# Patient Record
Sex: FEMALE | Race: BLACK OR AFRICAN AMERICAN | Employment: OTHER | URBAN - METROPOLITAN AREA
[De-identification: names, ages, dates, MRNs, and addresses within clinical notes are randomized per-mention and may not be internally consistent; named-entity substitution may affect disease eponyms.]

---

## 2018-11-19 ENCOUNTER — HOSPITAL ENCOUNTER (OUTPATIENT)
Dept: PHYSICAL THERAPY | Age: 63
Discharge: HOME | End: 2018-11-19
Payer: MEDICARE

## 2018-11-19 ENCOUNTER — HOSPITAL ENCOUNTER (OUTPATIENT)
Dept: SURGERY | Age: 63
Discharge: HOME | End: 2018-11-19
Payer: MEDICARE

## 2018-11-19 VITALS
WEIGHT: 285 LBS | HEART RATE: 85 BPM | BODY MASS INDEX: 44.73 KG/M2 | SYSTOLIC BLOOD PRESSURE: 125 MMHG | TEMPERATURE: 97.6 F | HEIGHT: 67 IN | OXYGEN SATURATION: 98 % | DIASTOLIC BLOOD PRESSURE: 53 MMHG

## 2018-11-19 PROBLEM — N18.30 CKD (CHRONIC KIDNEY DISEASE) STAGE 3, GFR 30-59 ML/MIN (HCC): Status: ACTIVE | Noted: 2018-11-19

## 2018-11-19 LAB
ANION GAP SERPL CALC-SCNC: 9 MMOL/L
APPEARANCE UR: ABNORMAL
APTT PPP: 32 SEC (ref 23.2–35.3)
ATRIAL RATE: 92 BPM
BACTERIA SPEC CULT: NORMAL
BACTERIA URNS QL MICRO: ABNORMAL /HPF
BASOPHILS # BLD: 0 K/UL (ref 0–0.2)
BASOPHILS NFR BLD: 0 % (ref 0–2)
BILIRUB UR QL: NEGATIVE
BUN SERPL-MCNC: 14 MG/DL (ref 8–23)
CALCIUM SERPL-MCNC: 9.3 MG/DL (ref 8.3–10.4)
CALCULATED P AXIS, ECG09: 60 DEGREES
CALCULATED R AXIS, ECG10: -51 DEGREES
CALCULATED T AXIS, ECG11: 47 DEGREES
CASTS URNS QL MICRO: ABNORMAL /LPF
CHLORIDE SERPL-SCNC: 99 MMOL/L (ref 98–107)
CO2 SERPL-SCNC: 31 MMOL/L (ref 21–32)
COLOR UR: YELLOW
CREAT SERPL-MCNC: 1.27 MG/DL (ref 0.6–1)
DIAGNOSIS, 93000: NORMAL
DIFFERENTIAL METHOD BLD: ABNORMAL
EOSINOPHIL # BLD: 0.2 K/UL (ref 0–0.8)
EOSINOPHIL NFR BLD: 2 % (ref 0.5–7.8)
EPI CELLS #/AREA URNS HPF: ABNORMAL /HPF
ERYTHROCYTE [DISTWIDTH] IN BLOOD BY AUTOMATED COUNT: 13.1 %
GLUCOSE SERPL-MCNC: 96 MG/DL (ref 65–100)
GLUCOSE UR STRIP.AUTO-MCNC: NEGATIVE MG/DL
HCT VFR BLD AUTO: 42.4 % (ref 35.8–46.3)
HGB BLD-MCNC: 13.1 G/DL (ref 11.7–15.4)
HGB UR QL STRIP: ABNORMAL
IMM GRANULOCYTES # BLD: 0 K/UL (ref 0–0.5)
IMM GRANULOCYTES NFR BLD AUTO: 0 % (ref 0–5)
INR PPP: 1
KETONES UR QL STRIP.AUTO: NEGATIVE MG/DL
LEUKOCYTE ESTERASE UR QL STRIP.AUTO: ABNORMAL
LYMPHOCYTES # BLD: 2 K/UL (ref 0.5–4.6)
LYMPHOCYTES NFR BLD: 30 % (ref 13–44)
MCH RBC QN AUTO: 27 PG (ref 26.1–32.9)
MCHC RBC AUTO-ENTMCNC: 30.9 G/DL (ref 31.4–35)
MCV RBC AUTO: 87.2 FL (ref 79.6–97.8)
MONOCYTES # BLD: 0.6 K/UL (ref 0.1–1.3)
MONOCYTES NFR BLD: 8 % (ref 4–12)
NEUTS SEG # BLD: 4 K/UL (ref 1.7–8.2)
NEUTS SEG NFR BLD: 59 % (ref 43–78)
NITRITE UR QL STRIP.AUTO: NEGATIVE
NRBC # BLD: 0 K/UL (ref 0–0.2)
P-R INTERVAL, ECG05: 144 MS
PH UR STRIP: 5.5 [PH] (ref 5–9)
PLATELET # BLD AUTO: 413 K/UL (ref 150–450)
PMV BLD AUTO: 9.6 FL (ref 9.4–12.3)
POTASSIUM SERPL-SCNC: 3.3 MMOL/L (ref 3.5–5.1)
PROT UR STRIP-MCNC: NEGATIVE MG/DL
PROTHROMBIN TIME: 13.6 SEC (ref 11.5–14.5)
Q-T INTERVAL, ECG07: 400 MS
QRS DURATION, ECG06: 136 MS
QTC CALCULATION (BEZET), ECG08: 494 MS
RBC # BLD AUTO: 4.86 M/UL (ref 4.05–5.2)
RBC #/AREA URNS HPF: ABNORMAL /HPF
SERVICE CMNT-IMP: NORMAL
SODIUM SERPL-SCNC: 139 MMOL/L (ref 136–145)
SP GR UR REFRACTOMETRY: 1.02 (ref 1–1.02)
UROBILINOGEN UR QL STRIP.AUTO: 1 EU/DL (ref 0.2–1)
VENTRICULAR RATE, ECG03: 92 BPM
WBC # BLD AUTO: 6.8 K/UL (ref 4.3–11.1)
WBC URNS QL MICRO: >100 /HPF

## 2018-11-19 PROCEDURE — 87641 MR-STAPH DNA AMP PROBE: CPT

## 2018-11-19 PROCEDURE — 85730 THROMBOPLASTIN TIME PARTIAL: CPT

## 2018-11-19 PROCEDURE — 85610 PROTHROMBIN TIME: CPT

## 2018-11-19 PROCEDURE — 93005 ELECTROCARDIOGRAM TRACING: CPT | Performed by: ANESTHESIOLOGY

## 2018-11-19 PROCEDURE — 80048 BASIC METABOLIC PNL TOTAL CA: CPT

## 2018-11-19 PROCEDURE — 36415 COLL VENOUS BLD VENIPUNCTURE: CPT

## 2018-11-19 PROCEDURE — G8980 MOBILITY D/C STATUS: HCPCS

## 2018-11-19 PROCEDURE — 81001 URINALYSIS AUTO W/SCOPE: CPT

## 2018-11-19 PROCEDURE — 85025 COMPLETE CBC W/AUTO DIFF WBC: CPT

## 2018-11-19 PROCEDURE — G8978 MOBILITY CURRENT STATUS: HCPCS

## 2018-11-19 PROCEDURE — G8979 MOBILITY GOAL STATUS: HCPCS

## 2018-11-19 PROCEDURE — 97161 PT EVAL LOW COMPLEX 20 MIN: CPT

## 2018-11-19 RX ORDER — TOPIRAMATE 50 MG/1
50 TABLET, FILM COATED ORAL DAILY
COMMUNITY
End: 2018-12-03

## 2018-11-19 RX ORDER — ALBUTEROL SULFATE 90 UG/1
1 AEROSOL, METERED RESPIRATORY (INHALATION)
COMMUNITY

## 2018-11-19 RX ORDER — ROSUVASTATIN CALCIUM 5 MG/1
5 TABLET, COATED ORAL
COMMUNITY
End: 2018-12-03

## 2018-11-19 RX ORDER — TRIAMTERENE AND HYDROCHLOROTHIAZIDE 37.5; 25 MG/1; MG/1
1 CAPSULE ORAL DAILY
COMMUNITY

## 2018-11-19 RX ORDER — PHENTERMINE HYDROCHLORIDE 15 MG/1
15 CAPSULE ORAL
COMMUNITY
End: 2018-12-03

## 2018-11-19 RX ORDER — TRAMADOL HYDROCHLORIDE 50 MG/1
50 TABLET ORAL
COMMUNITY
End: 2018-12-20

## 2018-11-19 RX ORDER — GABAPENTIN 300 MG/1
300 CAPSULE ORAL
COMMUNITY
End: 2018-12-20

## 2018-11-19 NOTE — PROGRESS NOTES
Agueda Martinez : 2062(41 y.o.) [address] at [address] [facility] [address] [phone] Physical Therapy Prehab Plan of Treatment and Evaluation Summary:2018 ICD-10: Treatment Diagnosis:  
· Pain in Left Knee (M25.562) · Stiffness of Left Knee, Not elsewhere classified (M25.662) · Difficulty in walking, Not elsewhere classified (R26.2) · Other abnormalities of gait and mobility (R26.89) Precautions/Allergies:  
Hydrocodone  MEDICAL/REFERRING DIAGNOSIS: 
Unilateral primary osteoarthritis, left knee [M17.12] REFERRING PHYSICIAN: Ashli Bustamante MD 
DATE OF SURGERY: 18 Assessment:  
Comments:  Pt with decreased independence with functional mobility. Pt plans to return home following hospital stay. Pt's daughter and granddaughter present and supportive. PROBLEM LIST (Impacting functional limitations): Ms. Zachary Ruelas presents with the following left lower extremity(s) problems: 1. Transfers 2. Gait 3. Strength 4. Range of Motion 5. Pain INTERVENTIONS PLANNED: 
1. Home Exercise Program 
2. Educational Discussion TREATMENT PLAN: Effective Dates: 2018 TO 2018. Frequency/Duration: Patient to continue to perform home exercise program at least twice per day up until her surgery. GOALS: (Goals have been discussed and agreed upon with patient.) Discharge Goals: Time Frame: 1 Day 1. Patient will demonstrate independence with a home exercise program designed to increase strength, range of motion and pain control to minimize functional deficits and optimize patient for total joint replacement. Rehabilitation Potential For Stated Goals: Good Regarding Agueda Martinez therapy, I certify that the treatment plan above will be carried out by a therapist or under their direction. Thank you for this referral, Laurel Litten Boteler, PT     
    
 
 
HISTORY:  
Present Symptoms: 
Pain Intensity 1: 10 
 Pain Location 1: Knee Pain Orientation 1: Left History of Present Injury/Illness (Reason for Referral): 
Medical/Referring Diagnosis: Unilateral primary osteoarthritis, left knee [M17.12] Past Medical History/Comorbidities: Ms. Isidro Lao  has a past medical history of Asthma, High cholesterol, History of anemia, History of hypokalemia, Hypertension, Morbid obesity ([address] Neuropathy, EMY on CPAP, Osteoarthritis, and Unspecified adverse effect of anesthesia. Ms. Isidro Lao  has a past surgical history that includes hx hernia repair; BILATERAL HAND CARPAL TUNNEL RELEASE (Bilateral, 9/16/2014); and RIGHT LONG FINGER TRIGGER RELEASE (Right, 9/16/2014). Social History/Living Environment:  
Home Environment: Private residence # Steps to Enter: 6 One/Two Story Residence: One story Living Alone: No 
Support Systems: Child(nano) Current DME Used/Available at Home: Elfreda Floro, straight, Commode, bedside Tub or Shower Type: Shower Work/Activity: 
retired Dominant Side: 
RIGHT Current Medications:  See Pre-assessment nursing note Number of Personal Factors/Comorbidities that affect the Plan of Care: 0: LOW COMPLEXITY EXAMINATION:  
ADLs (Current Functional Status):  
Ambulation: 
[x] Independent 
[] Walk Indoors Only 
[] Walk Outdoors [x] Use Assistive Device cane 
[] Use Wheelchair Only Dressing: 
[x] Independent Requires Assistance from Someone for: 
[x] Sock/Shoes 
[] Pants 
[] Everything Bathing/Showering:  
[x] Independent 
[] Requires Assistance from Someone 
[] Sponge Bath Only Household Activities: 
[] Routine house and yard work 
[] Light Housework Only sometimes dishes 
[x] None Observation/Orthostatic Postural Assessment:  
Within defined limits ROM/Flexibility:  
AROM: Generally decreased, functional 
 
  
  
  
  
LLE AROM 
L Knee Flexion: 60 L Knee Extension: 10 RLE Assessment RLE Assessment (WDL): Within defined limits Strength:  
Strength: Generally decreased, functional 
 
  
    
 Functional Mobility:   
Coordination: Generally decreased, functional 
 
Gait Description (WDL): Within defined limits Stand to Sit: Independent Sit to Stand: Independent Distance (ft): 125 Feet (ft)(wheelchair for longer distances) Ambulation - Level of Assistance: Modified independent Assistive Device: Cane, straight Gait Abnormalities: Antalgic Balance:   
Sitting: Intact Standing: Intact Body Structures Involved: 1. Bones 2. Joints 3. Muscles 4. Ligaments Body Functions Affected: 1. Neuromusculoskeletal 
2. Movement Related Activities and Participation Affected: 1. Mobility Number of elements that affect the Plan of Care: 4+: HIGH COMPLEXITY CLINICAL PRESENTATION:  
Presentation: Stable and uncomplicated: LOW COMPLEXITY CLINICAL DECISION MAKING:  
Outcome Measure: Tool Used: Lower Extremity Functional Scale (LEFS) Score:  Initial: 21/80 Most Recent: X/80 (Date: -- ) Interpretation of Score: 20 questions each scored on a 5 point scale with 0 representing \"extreme difficulty or unable to perform\" and 4 representing \"no difficulty\". The lower the score, the greater the functional disability. 80/80 represents no disability. Minimal detectable change is 9 points. Score 80 79-65 64-49 48-33 32-17 16-1 0 Modifier CH CI CJ CK CL CM CN  
 
? Mobility - Walking and Moving Around:  
  - CURRENT STATUS: CL - 60%-79% impaired, limited or restricted  - GOAL STATUS: CL - 60%-79% impaired, limited or restricted  - D/C STATUS:  CL - 60%-79% impaired, limited or restricted Medical Necessity:  
· Ms. Aliya Rayo is expected to optimize her lower extremity strength and ROM in preparation for joint replacement surgery. Reason for Services/Other Comments: · Achieve baseline assesment of musculoskeletal system, functional mobility and home environment. , educate in PT HEP in preparation for surgery, educate in hospital plan of care. Use of outcome tool(s) and clinical judgement create a POC that gives a: Clear prediction of patient's progress: LOW COMPLEXITY  
TREATMENT:  
Treatment/Session Assessment:  Patient was instructed in PT- HEP to increase strength and ROM in LEs. Answered all questions. · Post session pain:  10 
· Compliance with Program/Exercises: compliant most of the time. Total Treatment Duration: PT Patient Time In/Time Out Time In: 0945 Time Out: 1015 Claudette Adu, PT

## 2018-11-19 NOTE — PERIOP NOTES
Dr Amanda Mallory (anesthesia) reviewed pt's EKG from today- orders received for pt to have cardiology clearance prior to surgery. Called and l/m with Cary (MA to surgeon) informing of cardiac clearance need- pt is not an established pt at cardiology office per EMR.

## 2018-11-19 NOTE — ADVANCED PRACTICE NURSE
Total Joint Surgery Preoperative Chart Review      Patient ID:  Anna Ponce  [id number]  88 y.o.  1955  Surgeon: Dr. Jayesh Busby  Date of Surgery: 12/13/2018  Procedure: Total Left Knee Arthroplasty  Primary Care Physician: Leticia Campos [phone]  Specialty Physician(s):      Subjective:   Anna Ponce is a 70 y.o. BLACK OR  female who presents for preoperative evaluation for Total Left Knee arthroplasty. This is a preoperative chart review note based on data collected by the nurse at the surgical Pre-Assessment visit. Past Medical History:   Diagnosis Date    Asthma     ProAir PRN --Followed by Dr. Waymond Heimlich High cholesterol     History of anemia     History of hypokalemia     K+ 3.3 on 11/19/18    Hypertension     controlled with med    Morbid obesity ([facility] [address] 09/11/2014    BMI- 44.6      Neuropathy     EMY on CPAP     Followed by Dr. Chrissy Barajas Unspecified adverse effect of anesthesia     delayed awakening      Past Surgical History:   Procedure Laterality Date    HX HERNIA REPAIR       Family History   Problem Relation Age of Onset    Cancer Mother     Heart Disease Brother         takes NTG- no MI    Cancer Brother       Social History     Tobacco Use    Smoking status: Never Smoker    Smokeless tobacco: Never Used   Substance Use Topics    Alcohol use: No       Prior to Admission medications    Medication Sig Start Date End Date Taking? Authorizing Provider   gabapentin (NEURONTIN) 300 mg capsule Take 300 mg by mouth nightly. Yes Provider, Historical   traMADol (ULTRAM) 50 mg tablet Take 50 mg by mouth every six (6) hours as needed for Pain. Yes Provider, Historical   rosuvastatin (CRESTOR) 5 mg tablet Take 5 mg by mouth nightly. Yes Provider, Historical   phentermine (ADIPEX_P) 15 mg capsule Take 15 mg by mouth every morning. Yes Provider, Historical   topiramate (TOPAMAX) 50 mg tablet Take 50 mg by mouth daily. Yes Provider, Historical   triamterene-hydroCHLOROthiazide (DYAZIDE) 37.5-25 mg per capsule Take 1 Cap by mouth daily. Yes Provider, Historical   albuterol (PROAIR HFA) 90 mcg/actuation inhaler Take  by inhalation every four (4) hours as needed for Wheezing. Yes Provider, Historical     Allergies   Allergen Reactions    Hydrocodone Nausea and Vomiting          Objective:     Physical Exam:   Patient Vitals for the past 24 hrs:   Temp Pulse BP SpO2   11/19/18 1035 97.6 °F (36.4 °C) 85 125/53 98 %       ECG:    EKG Results     Procedure 720 Value Units Date/Time    EKG, 12 LEAD, INITIAL [[id number]] Collected:  11/19/18 1028    Order Status:  Completed Updated:  11/19/18 1256     Ventricular Rate 92 BPM      Atrial Rate 92 BPM      P-R Interval 144 ms      QRS Duration 136 ms      Q-T Interval 400 ms      QTC Calculation (Bezet) 494 ms      Calculated P Axis 60 degrees      Calculated R Axis -51 degrees      Calculated T Axis 47 degrees      Diagnosis --     Sinus rhythm with occasional Premature ventricular complexes  Right bundle branch block  Left anterior fascicular block  !!! Bifascicular block !!!  Cannot rule out Inferior infarct (masked by fascicular block?) , age   undetermined  Abnormal ECG  No previous ECGs available            Data Review:   Labs:   Recent Results (from the past 24 hour(s))   CBC WITH AUTOMATED DIFF    Collection Time: 11/19/18  9:15 AM   Result Value Ref Range    WBC 6.8 4.3 - 11.1 K/uL    RBC 4.86 4.05 - 5.2 M/uL    HGB 13.1 11.7 - 15.4 g/dL    HCT 42.4 35.8 - 46.3 %    MCV 87.2 79.6 - 97.8 FL    MCH 27.0 26.1 - 32.9 PG    MCHC 30.9 (L) 31.4 - 35.0 g/dL    RDW 13.1 %    PLATELET 483 031 - 397 K/uL    MPV 9.6 9.4 - 12.3 FL    ABSOLUTE NRBC 0.00 0.0 - 0.2 K/uL    DF AUTOMATED      NEUTROPHILS 59 43 - 78 %    LYMPHOCYTES 30 13 - 44 %    MONOCYTES 8 4.0 - 12.0 %    EOSINOPHILS 2 0.5 - 7.8 %    BASOPHILS 0 0.0 - 2.0 %    IMMATURE GRANULOCYTES 0 0.0 - 5.0 %    ABS.  NEUTROPHILS 4.0 1.7 - 8.2 K/UL    ABS. LYMPHOCYTES 2.0 0.5 - 4.6 K/UL    ABS. MONOCYTES 0.6 0.1 - 1.3 K/UL    ABS. EOSINOPHILS 0.2 0.0 - 0.8 K/UL    ABS. BASOPHILS 0.0 0.0 - 0.2 K/UL    ABS. IMM. GRANS. 0.0 0.0 - 0.5 K/UL   METABOLIC PANEL, BASIC    Collection Time: 11/19/18  9:15 AM   Result Value Ref Range    Sodium 139 136 - 145 mmol/L    Potassium 3.3 (L) 3.5 - 5.1 mmol/L    Chloride 99 98 - 107 mmol/L    CO2 31 21 - 32 mmol/L    Anion gap 9 mmol/L    Glucose 96 65 - 100 mg/dL    BUN 14 8 - 23 MG/DL    Creatinine 1.27 (H) 0.6 - 1.0 MG/DL    GFR est AA 54 (L) >60 ml/min/1.73m2    GFR est non-AA 45 ml/min/1.73m2    Calcium 9.3 8.3 - 10.4 MG/DL   PROTHROMBIN TIME + INR    Collection Time: 11/19/18  9:15 AM   Result Value Ref Range    Prothrombin time 13.6 11.5 - 14.5 sec    INR 1.0     PTT    Collection Time: 11/19/18  9:15 AM   Result Value Ref Range    aPTT 32.0 23.2 - 35.3 SEC   URINALYSIS W/ RFLX MICROSCOPIC    Collection Time: 11/19/18  9:15 AM   Result Value Ref Range    Color YELLOW      Appearance CLOUDY      Specific gravity 1.017 1.001 - 1.023      pH (UA) 5.5 5.0 - 9.0      Protein NEGATIVE  NEG mg/dL    Glucose NEGATIVE  mg/dL    Ketone NEGATIVE  NEG mg/dL    Bilirubin NEGATIVE  NEG      Blood TRACE (A) NEG      Urobilinogen 1.0 0.2 - 1.0 EU/dL    Nitrites NEGATIVE  NEG      Leukocyte Esterase LARGE (A) NEG      WBC >100 (H) 0 /hpf    RBC 3-5 0 /hpf    Epithelial cells 5-10 0 /hpf    Bacteria TRACE 0 /hpf    Casts 0-3 0 /lpf   EKG, 12 LEAD, INITIAL    Collection Time: 11/19/18 10:28 AM   Result Value Ref Range    Ventricular Rate 92 BPM    Atrial Rate 92 BPM    P-R Interval 144 ms    QRS Duration 136 ms    Q-T Interval 400 ms    QTC Calculation (Bezet) 494 ms    Calculated P Axis 60 degrees    Calculated R Axis -51 degrees    Calculated T Axis 47 degrees    Diagnosis       Sinus rhythm with occasional Premature ventricular complexes  Right bundle branch block  Left anterior fascicular block  !!!  Bifascicular block !!!  Cannot rule out Inferior infarct (masked by fascicular block?) , age   undetermined  Abnormal ECG  No previous ECGs available         Total Joint Surgery Pre-Assessment Recommendations:     Patient with multiple comorbidities including:  BMI greater than 40, advanced age 70, sleep apnea, CKD 3 and neuropathy. Patient would benefit from inpatient hospitalization with total knee surgery. Patient is to wear home CPAP during hospitalization. Albuterol every 6 hours as need during hospitalization.                Signed By: MARYANNE Tillman    November 19, 2018

## 2018-11-19 NOTE — PROGRESS NOTES
11/19/18 0900   Oxygen Therapy   O2 Sat (%) 93 %   Pulse via Oximetry 95 beats per minute   O2 Device Room air   Pre-Treatment   Breath Sounds Bilateral Clear;Diminished   Pre FEV1 (liters) 1.5 liters   % Predicted 68     Initial respiratory Assessment completed with pt. Pt was interviewed and evaluated in Joint camp prior to surgery. Patient ID:  Jed Meyer  [id number]  70 y.o.  1955  Surgeon: Dr. Borja  Date of Surgery: 12/13/2018  Procedure: Total Left Knee Arthroplasty  Primary Care Physician: Maine Juares -[phone]  Specialists:                                  Pt instructed in the use of Incentive Spirometry. Pt instructed to bring Incentive Spirometer back on date of surgery & to start using Is upon return to pt room.     Pt taught proper cough technique    History of smoking:   FORMER 1 PACK A MONTH OFF & ON UNTIL AGE 33                                                      Quit date:           Secondhand smoke:MOTHER      Past procedures with Oxygen desaturation:DELAYED AWAKENING    Past Medical History:   Diagnosis Date    Asthma     ProAir PRN --Followed by Dr. Evangelista Jhaveri High cholesterol     History of anemia     History of hypokalemia     K+ 3.3 on 11/19/18    Hypertension     controlled with med    Morbid obesity ([facility] [address] 09/11/2014    BMI- 44.6      Neuropathy     EMY on CPAP     Followed by Dr. Cierra Madden Unspecified adverse effect of anesthesia     delayed awakening                                                           ASTHMA                                                                                          Respiratory history:DENIES SOB                                                                  Respiratory meds: DOES NOT USE MDI CURRENTLY                                        FAMILY PRESENT:          DAUGHTER                                                                                      PAST SLEEP STUDY:        YES HX OF EMY:                        YES                                                       EMY assessment:                                               SLEEPS ON SIDE          AND             STOMACH                                                 PHYSICAL EXAM   Body mass index is 44.64 kg/m².    Visit Vitals  /53 (BP 1 Location: Left arm, BP Patient Position: At rest)   Pulse 85   Temp 97.6 °F (36.4 °C)   Ht 5' 7\" (1.702 m)   Wt 129.3 kg (285 lb)   SpO2 98%   BMI 44.64 kg/m²     Neck circumference:42.5      cm    Loud snoring:        YES      Witnessed apnea or wakening gasping or choking:,                                                                                                    APNEA    Awakens with headaches:                                                  DENIES    Morning or daytime tiredness/ sleepiness:                                                                                                       TIRED   Dry mouth or sore throat in morning:                YES                                                                           Grubbs stage:  4    SACS score:30    STOP/BANG:                              CPAP:                                                       HOME CPAP      ALBUTEROL NEB Q6 PRN                     Referrals:    Pt. Phone Number:

## 2018-11-19 NOTE — PERIOP NOTES
Recent Results (from the past 12 hour(s))   CBC WITH AUTOMATED DIFF    Collection Time: 11/19/18  9:15 AM   Result Value Ref Range    WBC 6.8 4.3 - 11.1 K/uL    RBC 4.86 4.05 - 5.2 M/uL    HGB 13.1 11.7 - 15.4 g/dL    HCT 42.4 35.8 - 46.3 %    MCV 87.2 79.6 - 97.8 FL    MCH 27.0 26.1 - 32.9 PG    MCHC 30.9 (L) 31.4 - 35.0 g/dL    RDW 13.1 %    PLATELET 587 634 - 232 K/uL    MPV 9.6 9.4 - 12.3 FL    ABSOLUTE NRBC 0.00 0.0 - 0.2 K/uL    DF AUTOMATED      NEUTROPHILS 59 43 - 78 %    LYMPHOCYTES 30 13 - 44 %    MONOCYTES 8 4.0 - 12.0 %    EOSINOPHILS 2 0.5 - 7.8 %    BASOPHILS 0 0.0 - 2.0 %    IMMATURE GRANULOCYTES 0 0.0 - 5.0 %    ABS. NEUTROPHILS 4.0 1.7 - 8.2 K/UL    ABS. LYMPHOCYTES 2.0 0.5 - 4.6 K/UL    ABS. MONOCYTES 0.6 0.1 - 1.3 K/UL    ABS. EOSINOPHILS 0.2 0.0 - 0.8 K/UL    ABS. BASOPHILS 0.0 0.0 - 0.2 K/UL    ABS. IMM.  GRANS. 0.0 0.0 - 0.5 K/UL   METABOLIC PANEL, BASIC    Collection Time: 11/19/18  9:15 AM   Result Value Ref Range    Sodium 139 136 - 145 mmol/L    Potassium 3.3 (L) 3.5 - 5.1 mmol/L    Chloride 99 98 - 107 mmol/L    CO2 31 21 - 32 mmol/L    Anion gap 9 mmol/L    Glucose 96 65 - 100 mg/dL    BUN 14 8 - 23 MG/DL    Creatinine 1.27 (H) 0.6 - 1.0 MG/DL    GFR est AA 54 (L) >60 ml/min/1.73m2    GFR est non-AA 45 ml/min/1.73m2    Calcium 9.3 8.3 - 10.4 MG/DL   PROTHROMBIN TIME + INR    Collection Time: 11/19/18  9:15 AM   Result Value Ref Range    Prothrombin time 13.6 11.5 - 14.5 sec    INR 1.0     PTT    Collection Time: 11/19/18  9:15 AM   Result Value Ref Range    aPTT 32.0 23.2 - 35.3 SEC   URINALYSIS W/ RFLX MICROSCOPIC    Collection Time: 11/19/18  9:15 AM   Result Value Ref Range    Color YELLOW      Appearance CLOUDY      Specific gravity 1.017 1.001 - 1.023      pH (UA) 5.5 5.0 - 9.0      Protein NEGATIVE  NEG mg/dL    Glucose NEGATIVE  mg/dL    Ketone NEGATIVE  NEG mg/dL    Bilirubin NEGATIVE  NEG      Blood TRACE (A) NEG      Urobilinogen 1.0 0.2 - 1.0 EU/dL    Nitrites NEGATIVE  NEG Leukocyte Esterase LARGE (A) NEG      WBC >100 (H) 0 /hpf    RBC 3-5 0 /hpf    Epithelial cells 5-10 0 /hpf    Bacteria TRACE 0 /hpf    Casts 0-3 0 /lpf

## 2018-11-19 NOTE — PERIOP NOTES
Labs dated 11/19/18 routed via [facility] to patients PCP, Dr. Anthony Johns, and to ordering physician Dr. Carrington Gutierrez.

## 2018-11-19 NOTE — PERIOP NOTES
Patient verified name, procedure, and . Order for consent found in EHR and matches case posting. Type 3 surgery, PAT joint assessment complete. Labs per surgeon: cbc, bmp, ua, pt/inr, ptt; results within anesthesia limits. MRSA/MSSA swab results pending--pharmacy to follow up. T&S DOS; order signed and held in [facility]. Labs per anesthesia protocol: All required lab work included in surgeon's orders. EKG: completed 18; results to be reviewed by anesthesia once previous EKG tracing received from patient's PCP's office for comparison. Most recent pulmonary note (18) located in EHR if needed for anesthesia reference. Hibiclens and instructions to return bottle on DOS given per hospital policy. Patient provided with handouts including Guide to Surgery, Pain Management, Hand Hygiene, Blood Transfusion Education, and [place] [facility] Brochure. Patient answered medical/surgical history questions at their best of ability. All prior to admission medications documented in [facility]. Original medication prescription bottle NOT visualized during patient appointment. Patient instructed to hold all vitamins 7 days prior to surgery and NSAIDS 5 days prior to surgery. Medications to be held: all non-prescribed vitamins/supplements/herbals, and Phentermine. Patient instructed to continue previous medications as prescribed prior to surgery and to take the following medications the day of surgery according to anesthesia guidelines with a small sip of water: Tramadol if needed, Topiramate, and ProAir PRN. Patient instructed to bring bottle of Hibiclens, ProAir, and CPAP to the hospital on the DOS. Patient teach back successful and patient demonstrates knowledge of instruction.

## 2018-12-03 NOTE — PERIOP NOTES
Cardiology clearance note dated today (12/3/18) in EMR that reads:    \"Patient is low to moderate risk for knee replacement surgery, continue BP control. She should be able to resume her CPAP after surgery citing severe pain as the reason she is not using it currently.  Able to attain 4 MET workload and greater than that prior to 3 months ago without symptoms\"

## 2018-12-12 ENCOUNTER — ANESTHESIA EVENT (OUTPATIENT)
Dept: SURGERY | Age: 63
DRG: 470 | End: 2018-12-12
Payer: MEDICARE

## 2018-12-12 RX ORDER — HYDROMORPHONE HYDROCHLORIDE 2 MG/ML
0.5 INJECTION, SOLUTION INTRAMUSCULAR; INTRAVENOUS; SUBCUTANEOUS
Status: CANCELLED | OUTPATIENT
Start: 2018-12-12

## 2018-12-12 RX ORDER — SODIUM CHLORIDE, SODIUM LACTATE, POTASSIUM CHLORIDE, CALCIUM CHLORIDE 600; 310; 30; 20 MG/100ML; MG/100ML; MG/100ML; MG/100ML
75 INJECTION, SOLUTION INTRAVENOUS CONTINUOUS
Status: CANCELLED | OUTPATIENT
Start: 2018-12-12

## 2018-12-12 RX ORDER — DIPHENHYDRAMINE HYDROCHLORIDE 50 MG/ML
12.5 INJECTION, SOLUTION INTRAMUSCULAR; INTRAVENOUS
Status: CANCELLED | OUTPATIENT
Start: 2018-12-12

## 2018-12-12 RX ORDER — OXYCODONE HYDROCHLORIDE 5 MG/1
10 TABLET ORAL
Status: CANCELLED | OUTPATIENT
Start: 2018-12-12 | End: 2018-12-13

## 2018-12-12 RX ORDER — OXYCODONE HYDROCHLORIDE 5 MG/1
5 TABLET ORAL
Status: CANCELLED | OUTPATIENT
Start: 2018-12-12 | End: 2018-12-13

## 2018-12-12 RX ORDER — NALOXONE HYDROCHLORIDE 0.4 MG/ML
0.1 INJECTION, SOLUTION INTRAMUSCULAR; INTRAVENOUS; SUBCUTANEOUS
Status: CANCELLED | OUTPATIENT
Start: 2018-12-12

## 2018-12-12 RX ORDER — FLUMAZENIL 0.1 MG/ML
0.2 INJECTION INTRAVENOUS AS NEEDED
Status: CANCELLED | OUTPATIENT
Start: 2018-12-12

## 2018-12-12 NOTE — H&P
H&P    Patient ID:  María Elena Bonilla  [id number]  51 y.o.  1955  Surgeon:  Patricia Stover MD  Date of Surgery: * No surgery date entered *  Procedure: Left Knee Total Arthroplasty  Primary Care Physician: Gelacio Carr MD        Subjective:  María Elena Bonilla is a 70 y.o. BLACK OR  female who presents with Left Knee pain. She has history of Left Knee pain for several months ago. Symptoms worse with walking and relieved with rest. Conservative treatment consisting of  therapeutic injections into the knee has not helped. The patient  lives with their family. The patients goal after surgery is improved pain and function. Past Medical History:   Diagnosis Date    Asthma     ProAir PRN --Followed by Dr. Hugh Goss High cholesterol     History of anemia     History of hypokalemia     K+ 3.3 on 11/19/18    Hypertension     controlled with med    Morbid obesity ([facility] [address] 09/11/2014    BMI- 44.6      Neuropathy     EMY on CPAP     Followed by Dr. Laurel Johnson Unspecified adverse effect of anesthesia     delayed awakening      Past Surgical History:   Procedure Laterality Date    HX HERNIA REPAIR       Family History   Problem Relation Age of Onset    Cancer Mother     Heart Disease Brother         takes NTG- no MI    Cancer Brother       Social History     Tobacco Use    Smoking status: Never Smoker    Smokeless tobacco: Never Used   Substance Use Topics    Alcohol use: No       Prior to Admission medications    Medication Sig Start Date End Date Taking? Authorizing Provider   gabapentin (NEURONTIN) 300 mg capsule Take 300 mg by mouth nightly. Provider, Historical   traMADol (ULTRAM) 50 mg tablet Take 50 mg by mouth every six (6) hours as needed for Pain. Provider, Historical   triamterene-hydroCHLOROthiazide (DYAZIDE) 37.5-25 mg per capsule Take 1 Cap by mouth daily.     Provider, Historical   albuterol (PROAIR HFA) 90 mcg/actuation inhaler Take  by inhalation every four (4) hours as needed for Wheezing. Provider, Historical     Allergies   Allergen Reactions    Hydrocodone Nausea and Vomiting        REVIEW OF SYSTEMS:  CONSTITUTIONAL: Denies fever, decreased appetite, weight loss/gain, night sweats or fatigue. HEENT: Denies vision or hearing changes. denies glasses. denies hearing aids. CARDIAC: Denies CP, palpitations, rheumatic fever, murmur, peripheral edema, carotid artery disease or syncopal episodes. RESPIRATORY: Denies dyspnea on exertion, asthma, COPD or orthopnea. GI: Denies GERD, history of GI bleed or melena, PUD, hepatitis or cirrhosis. : Denies dysuria, hematuria. denies BPH symptoms. HEMATOLOGIC: Denies anemia or blood disorders. ENDOCRINE: Denies thyroid disease. MUSCULOSKELETAL: See HPI. NEUROLOGIC: Denies seizure, peripheral neuropathy or memory loss. PSYCH: Denies depression, anxiety or insomnia. SKIN: Denies rash or open sores. Objective:    PHYSICAL EXAM  GENERAL:   Patient Vitals for the past 8 hrs:   Height Weight   12/12/18 0739 5' 7\" (1.702 m) 129.3 kg (284 lb 16 oz)    EYES: PERRL. EOM intact. MOUTH:Teeth and Gums normal. NECK: Full ROM. Trachea midline. No thyromegaly or JVD. CARDIOVASCULAR: Regular rate and rhythm. No murmur or gallops. No carotid bruits. Peripheral pulses: radial 2 +, PT 2+, DP 2+ bilaterally. LUNGS: CTA bilaterally. No wheezes, rhonchi or rales. GI: positive BS. Abdomen nontender. NEUROLOGIC: Alert and oriented x 3. Bilateral equal strong had grasp and bilateral equal strong plantar flexion and dorsiflexion. GAIT: abnormal  MUSCULOSKELETAL: ROM: full range with pain. Tenderness: Medial joint line. Crepitus: present. SKIN: No rash, bruising, swelling, redness or warmth. Labs:  No results found for this or any previous visit (from the past 24 hour(s)). Xray Left Knee: Joint space narrowing    Assessment:  Advanced Left Knee Osteoarthritis. Total Left Knee Arthroplasty Indicated.   Patient Active Problem List   Diagnosis Code    CKD (chronic kidney disease) stage 3, GFR 30-59 ml/min ([facility] N18.3       Plan:  I have advised the patient of the risks and consequences, including possible complications of performing total joint replacement, as well as not doing this operation. The patient had the opportunity to ask questions and have them answered to their satisfaction.      Signed:  JAMI Encinas 12/12/2018

## 2018-12-13 ENCOUNTER — HOME HEALTH ADMISSION (OUTPATIENT)
Dept: HOME HEALTH SERVICES | Facility: HOME HEALTH | Age: 63
End: 2018-12-13
Payer: MEDICARE

## 2018-12-13 ENCOUNTER — ANESTHESIA (OUTPATIENT)
Dept: SURGERY | Age: 63
DRG: 470 | End: 2018-12-13
Payer: MEDICARE

## 2018-12-13 ENCOUNTER — HOSPITAL ENCOUNTER (INPATIENT)
Age: 63
LOS: 2 days | Discharge: HOME HEALTH | DRG: 470 | End: 2018-12-15
Attending: ORTHOPAEDIC SURGERY | Admitting: ORTHOPAEDIC SURGERY
Payer: MEDICARE

## 2018-12-13 DIAGNOSIS — Z96.652 S/P TKR (TOTAL KNEE REPLACEMENT) USING CEMENT, LEFT: Primary | ICD-10-CM

## 2018-12-13 PROBLEM — M17.12 ARTHRITIS OF KNEE, LEFT: Status: ACTIVE | Noted: 2018-12-13

## 2018-12-13 LAB
ABO + RH BLD: NORMAL
BLOOD GROUP ANTIBODIES SERPL: NORMAL
GLUCOSE BLD STRIP.AUTO-MCNC: 89 MG/DL (ref 65–100)
HGB BLD-MCNC: 11.4 G/DL (ref 11.7–15.4)
SPECIMEN EXP DATE BLD: NORMAL

## 2018-12-13 PROCEDURE — 74011250637 HC RX REV CODE- 250/637: Performed by: ORTHOPAEDIC SURGERY

## 2018-12-13 PROCEDURE — 97161 PT EVAL LOW COMPLEX 20 MIN: CPT

## 2018-12-13 PROCEDURE — 74011000250 HC RX REV CODE- 250: Performed by: ORTHOPAEDIC SURGERY

## 2018-12-13 PROCEDURE — 77030035236 HC SUT PDS STRATFX BARB J&J -B: Performed by: ORTHOPAEDIC SURGERY

## 2018-12-13 PROCEDURE — 77030032490 HC SLV COMPR SCD KNE COVD -B

## 2018-12-13 PROCEDURE — 77030012935 HC DRSG AQUACEL BMS -B

## 2018-12-13 PROCEDURE — 76210000016 HC OR PH I REC 1 TO 1.5 HR: Performed by: ORTHOPAEDIC SURGERY

## 2018-12-13 PROCEDURE — 76010010054 HC POST OP PAIN BLOCK: Performed by: ORTHOPAEDIC SURGERY

## 2018-12-13 PROCEDURE — 77030020782 HC GWN BAIR PAWS FLX 3M -B: Performed by: ANESTHESIOLOGY

## 2018-12-13 PROCEDURE — 82962 GLUCOSE BLOOD TEST: CPT

## 2018-12-13 PROCEDURE — 77030020263 HC SOL INJ SOD CL0.9% LFCR 1000ML

## 2018-12-13 PROCEDURE — 85018 HEMOGLOBIN: CPT

## 2018-12-13 PROCEDURE — C1713 ANCHOR/SCREW BN/BN,TIS/BN: HCPCS | Performed by: ORTHOPAEDIC SURGERY

## 2018-12-13 PROCEDURE — 65270000029 HC RM PRIVATE

## 2018-12-13 PROCEDURE — 74011250636 HC RX REV CODE- 250/636

## 2018-12-13 PROCEDURE — 74011000250 HC RX REV CODE- 250

## 2018-12-13 PROCEDURE — 77030018836 HC SOL IRR NACL ICUM -A: Performed by: ORTHOPAEDIC SURGERY

## 2018-12-13 PROCEDURE — 74011250636 HC RX REV CODE- 250/636: Performed by: ORTHOPAEDIC SURGERY

## 2018-12-13 PROCEDURE — 74011250637 HC RX REV CODE- 250/637: Performed by: ANESTHESIOLOGY

## 2018-12-13 PROCEDURE — 77030033067 HC SUT PDO STRATFX SPIR J&J -B: Performed by: ORTHOPAEDIC SURGERY

## 2018-12-13 PROCEDURE — 86901 BLOOD TYPING SEROLOGIC RH(D): CPT

## 2018-12-13 PROCEDURE — 97165 OT EVAL LOW COMPLEX 30 MIN: CPT

## 2018-12-13 PROCEDURE — C1776 JOINT DEVICE (IMPLANTABLE): HCPCS | Performed by: ORTHOPAEDIC SURGERY

## 2018-12-13 PROCEDURE — 77030006720 HC BLD PAT RMR ZIMM -B: Performed by: ORTHOPAEDIC SURGERY

## 2018-12-13 PROCEDURE — 77030003602 HC NDL NRV BLK BBMI -B: Performed by: ANESTHESIOLOGY

## 2018-12-13 PROCEDURE — 77030003665 HC NDL SPN BBMI -A: Performed by: ANESTHESIOLOGY

## 2018-12-13 PROCEDURE — 94760 N-INVAS EAR/PLS OXIMETRY 1: CPT

## 2018-12-13 PROCEDURE — 86580 TB INTRADERMAL TEST: CPT | Performed by: ORTHOPAEDIC SURGERY

## 2018-12-13 PROCEDURE — 77030007880 HC KT SPN EPDRL BBMI -B: Performed by: ANESTHESIOLOGY

## 2018-12-13 PROCEDURE — 77030016544 HC BLD SAW RECIP1 STRY -B: Performed by: ORTHOPAEDIC SURGERY

## 2018-12-13 PROCEDURE — 76942 ECHO GUIDE FOR BIOPSY: CPT | Performed by: ORTHOPAEDIC SURGERY

## 2018-12-13 PROCEDURE — 77030018846 HC SOL IRR STRL H20 ICUM -A: Performed by: ORTHOPAEDIC SURGERY

## 2018-12-13 PROCEDURE — 77030006835 HC BLD SAW SAG STRY -B: Performed by: ORTHOPAEDIC SURGERY

## 2018-12-13 PROCEDURE — 76060000034 HC ANESTHESIA 1.5 TO 2 HR: Performed by: ORTHOPAEDIC SURGERY

## 2018-12-13 PROCEDURE — 0SRD0J9 REPLACEMENT OF LEFT KNEE JOINT WITH SYNTHETIC SUBSTITUTE, CEMENTED, OPEN APPROACH: ICD-10-PCS | Performed by: ORTHOPAEDIC SURGERY

## 2018-12-13 PROCEDURE — 87086 URINE CULTURE/COLONY COUNT: CPT

## 2018-12-13 PROCEDURE — 77030037623 HC FEM TRIAL PK ATTUNE INTTN J&J -D: Performed by: ORTHOPAEDIC SURGERY

## 2018-12-13 PROCEDURE — 36415 COLL VENOUS BLD VENIPUNCTURE: CPT

## 2018-12-13 PROCEDURE — 77030006804 HC BLD SAW RECIP CNMD -B: Performed by: ORTHOPAEDIC SURGERY

## 2018-12-13 PROCEDURE — 74011250636 HC RX REV CODE- 250/636: Performed by: ANESTHESIOLOGY

## 2018-12-13 PROCEDURE — 77030008467 HC STPLR SKN COVD -B: Performed by: ORTHOPAEDIC SURGERY

## 2018-12-13 PROCEDURE — 77030013819 HC MX SYS CEM ZIMM -B: Performed by: ORTHOPAEDIC SURGERY

## 2018-12-13 PROCEDURE — 74011000302 HC RX REV CODE- 302: Performed by: ORTHOPAEDIC SURGERY

## 2018-12-13 PROCEDURE — 77030011208: Performed by: ORTHOPAEDIC SURGERY

## 2018-12-13 PROCEDURE — 77030036688 HC BLNKT CLD THER S2SG -B

## 2018-12-13 PROCEDURE — 74011000258 HC RX REV CODE- 258: Performed by: ORTHOPAEDIC SURGERY

## 2018-12-13 PROCEDURE — 77030031139 HC SUT VCRL2 J&J -A: Performed by: ORTHOPAEDIC SURGERY

## 2018-12-13 PROCEDURE — 77030018673: Performed by: ORTHOPAEDIC SURGERY

## 2018-12-13 PROCEDURE — 76010000162 HC OR TIME 1.5 TO 2 HR INTENSV-TIER 1: Performed by: ORTHOPAEDIC SURGERY

## 2018-12-13 PROCEDURE — 77030013727 HC IRR FAN PULSVC ZIMM -B: Performed by: ORTHOPAEDIC SURGERY

## 2018-12-13 PROCEDURE — 77030019557 HC ELECTRD VES SEAL MEDT -F: Performed by: ORTHOPAEDIC SURGERY

## 2018-12-13 PROCEDURE — 77030034849: Performed by: ORTHOPAEDIC SURGERY

## 2018-12-13 PROCEDURE — 77030027138 HC INCENT SPIROMETER -A

## 2018-12-13 PROCEDURE — 77030021907 HC KT URIN FOL O&M -A: Performed by: ORTHOPAEDIC SURGERY

## 2018-12-13 PROCEDURE — 77030012935 HC DRSG AQUACEL BMS -B: Performed by: ORTHOPAEDIC SURGERY

## 2018-12-13 DEVICE — COMPONENT PAT DIA38MM POLYETH DOME CEM MEDIALIZED ATTUNE: Type: IMPLANTABLE DEVICE | Site: KNEE | Status: FUNCTIONAL

## 2018-12-13 DEVICE — STEM FEM L50MM DIA14MM KNEE CEM REV ATTUNE: Type: IMPLANTABLE DEVICE | Site: KNEE | Status: FUNCTIONAL

## 2018-12-13 DEVICE — BASEPLATE TIB SZ 5 FIX BEAR CO CHROM MOLYBDENUM TI ALLY END: Type: IMPLANTABLE DEVICE | Site: KNEE | Status: FUNCTIONAL

## 2018-12-13 DEVICE — (D)CEMENT BNE HV R 40GM -- DUPE USE ITEM 353850: Type: IMPLANTABLE DEVICE | Site: KNEE | Status: FUNCTIONAL

## 2018-12-13 DEVICE — IMPLANTABLE DEVICE: Type: IMPLANTABLE DEVICE | Site: KNEE | Status: FUNCTIONAL

## 2018-12-13 DEVICE — COMPONENT FEM SZ 5 L KNEE POST STBL CEM ATTUNE: Type: IMPLANTABLE DEVICE | Site: KNEE | Status: FUNCTIONAL

## 2018-12-13 RX ORDER — GABAPENTIN 300 MG/1
300 CAPSULE ORAL
Status: DISCONTINUED | OUTPATIENT
Start: 2018-12-13 | End: 2018-12-15 | Stop reason: HOSPADM

## 2018-12-13 RX ORDER — MIDAZOLAM HYDROCHLORIDE 1 MG/ML
2 INJECTION, SOLUTION INTRAMUSCULAR; INTRAVENOUS
Status: DISCONTINUED | OUTPATIENT
Start: 2018-12-13 | End: 2018-12-13 | Stop reason: HOSPADM

## 2018-12-13 RX ORDER — ACETAMINOPHEN 10 MG/ML
1000 INJECTION, SOLUTION INTRAVENOUS ONCE
Status: COMPLETED | OUTPATIENT
Start: 2018-12-13 | End: 2018-12-13

## 2018-12-13 RX ORDER — ROPIVACAINE HYDROCHLORIDE 2 MG/ML
INJECTION, SOLUTION EPIDURAL; INFILTRATION; PERINEURAL AS NEEDED
Status: DISCONTINUED | OUTPATIENT
Start: 2018-12-13 | End: 2018-12-13 | Stop reason: HOSPADM

## 2018-12-13 RX ORDER — SODIUM CHLORIDE 0.9 % (FLUSH) 0.9 %
5-10 SYRINGE (ML) INJECTION EVERY 8 HOURS
Status: DISCONTINUED | OUTPATIENT
Start: 2018-12-13 | End: 2018-12-15 | Stop reason: HOSPADM

## 2018-12-13 RX ORDER — DIPHENHYDRAMINE HCL 25 MG
25 CAPSULE ORAL
Status: DISCONTINUED | OUTPATIENT
Start: 2018-12-13 | End: 2018-12-15 | Stop reason: HOSPADM

## 2018-12-13 RX ORDER — TRIAMTERENE AND HYDROCHLOROTHIAZIDE 37.5; 25 MG/1; MG/1
1 CAPSULE ORAL DAILY
Status: DISCONTINUED | OUTPATIENT
Start: 2018-12-13 | End: 2018-12-13 | Stop reason: SDUPTHER

## 2018-12-13 RX ORDER — CELECOXIB 200 MG/1
200 CAPSULE ORAL ONCE
Status: COMPLETED | OUTPATIENT
Start: 2018-12-13 | End: 2018-12-13

## 2018-12-13 RX ORDER — HYDROMORPHONE HYDROCHLORIDE 2 MG/1
2-4 TABLET ORAL
Status: DISCONTINUED | OUTPATIENT
Start: 2018-12-13 | End: 2018-12-15 | Stop reason: HOSPADM

## 2018-12-13 RX ORDER — ONDANSETRON 8 MG/1
8 TABLET, ORALLY DISINTEGRATING ORAL
Status: DISCONTINUED | OUTPATIENT
Start: 2018-12-13 | End: 2018-12-15 | Stop reason: HOSPADM

## 2018-12-13 RX ORDER — LIDOCAINE HYDROCHLORIDE 20 MG/ML
INJECTION, SOLUTION EPIDURAL; INFILTRATION; INTRACAUDAL; PERINEURAL AS NEEDED
Status: DISCONTINUED | OUTPATIENT
Start: 2018-12-13 | End: 2018-12-13 | Stop reason: HOSPADM

## 2018-12-13 RX ORDER — NALOXONE HYDROCHLORIDE 0.4 MG/ML
.2-.4 INJECTION, SOLUTION INTRAMUSCULAR; INTRAVENOUS; SUBCUTANEOUS
Status: DISCONTINUED | OUTPATIENT
Start: 2018-12-13 | End: 2018-12-15 | Stop reason: HOSPADM

## 2018-12-13 RX ORDER — SODIUM CHLORIDE, SODIUM LACTATE, POTASSIUM CHLORIDE, CALCIUM CHLORIDE 600; 310; 30; 20 MG/100ML; MG/100ML; MG/100ML; MG/100ML
75 INJECTION, SOLUTION INTRAVENOUS CONTINUOUS
Status: DISCONTINUED | OUTPATIENT
Start: 2018-12-13 | End: 2018-12-13 | Stop reason: HOSPADM

## 2018-12-13 RX ORDER — BUPIVACAINE HYDROCHLORIDE 7.5 MG/ML
INJECTION, SOLUTION INTRASPINAL
Status: COMPLETED | OUTPATIENT
Start: 2018-12-13 | End: 2018-12-13

## 2018-12-13 RX ORDER — ASPIRIN 81 MG/1
81 TABLET ORAL EVERY 12 HOURS
Status: DISCONTINUED | OUTPATIENT
Start: 2018-12-13 | End: 2018-12-15 | Stop reason: HOSPADM

## 2018-12-13 RX ORDER — ALBUTEROL SULFATE 90 UG/1
2 AEROSOL, METERED RESPIRATORY (INHALATION)
Status: DISCONTINUED | OUTPATIENT
Start: 2018-12-13 | End: 2018-12-13 | Stop reason: SDUPTHER

## 2018-12-13 RX ORDER — PROPOFOL 10 MG/ML
INJECTION, EMULSION INTRAVENOUS
Status: DISCONTINUED | OUTPATIENT
Start: 2018-12-13 | End: 2018-12-13 | Stop reason: HOSPADM

## 2018-12-13 RX ORDER — FENTANYL CITRATE 50 UG/ML
100 INJECTION, SOLUTION INTRAMUSCULAR; INTRAVENOUS ONCE
Status: COMPLETED | OUTPATIENT
Start: 2018-12-13 | End: 2018-12-13

## 2018-12-13 RX ORDER — CELECOXIB 200 MG/1
200 CAPSULE ORAL EVERY 12 HOURS
Status: DISCONTINUED | OUTPATIENT
Start: 2018-12-13 | End: 2018-12-15 | Stop reason: HOSPADM

## 2018-12-13 RX ORDER — PROMETHAZINE HYDROCHLORIDE 25 MG/1
25 TABLET ORAL
Status: DISCONTINUED | OUTPATIENT
Start: 2018-12-13 | End: 2018-12-15 | Stop reason: HOSPADM

## 2018-12-13 RX ORDER — DEXAMETHASONE SODIUM PHOSPHATE 100 MG/10ML
10 INJECTION INTRAMUSCULAR; INTRAVENOUS ONCE
Status: ACTIVE | OUTPATIENT
Start: 2018-12-14 | End: 2018-12-15

## 2018-12-13 RX ORDER — DEXAMETHASONE SODIUM PHOSPHATE 100 MG/10ML
INJECTION INTRAMUSCULAR; INTRAVENOUS AS NEEDED
Status: DISCONTINUED | OUTPATIENT
Start: 2018-12-13 | End: 2018-12-13 | Stop reason: HOSPADM

## 2018-12-13 RX ORDER — ZOLPIDEM TARTRATE 5 MG/1
5 TABLET ORAL
Status: DISCONTINUED | OUTPATIENT
Start: 2018-12-13 | End: 2018-12-15 | Stop reason: HOSPADM

## 2018-12-13 RX ORDER — SODIUM CHLORIDE 9 MG/ML
100 INJECTION, SOLUTION INTRAVENOUS CONTINUOUS
Status: DISPENSED | OUTPATIENT
Start: 2018-12-13 | End: 2018-12-14

## 2018-12-13 RX ORDER — PROPOFOL 10 MG/ML
INJECTION, EMULSION INTRAVENOUS AS NEEDED
Status: DISCONTINUED | OUTPATIENT
Start: 2018-12-13 | End: 2018-12-13 | Stop reason: HOSPADM

## 2018-12-13 RX ORDER — SODIUM CHLORIDE 0.9 % (FLUSH) 0.9 %
5-10 SYRINGE (ML) INJECTION AS NEEDED
Status: DISCONTINUED | OUTPATIENT
Start: 2018-12-13 | End: 2018-12-15 | Stop reason: HOSPADM

## 2018-12-13 RX ORDER — LIDOCAINE HYDROCHLORIDE 10 MG/ML
0.1 INJECTION INFILTRATION; PERINEURAL AS NEEDED
Status: DISCONTINUED | OUTPATIENT
Start: 2018-12-13 | End: 2018-12-13 | Stop reason: HOSPADM

## 2018-12-13 RX ORDER — EPHEDRINE SULFATE 50 MG/ML
INJECTION, SOLUTION INTRAVENOUS AS NEEDED
Status: DISCONTINUED | OUTPATIENT
Start: 2018-12-13 | End: 2018-12-13 | Stop reason: HOSPADM

## 2018-12-13 RX ORDER — TRIAMTERENE/HYDROCHLOROTHIAZID 37.5-25 MG
1 TABLET ORAL DAILY
Status: DISCONTINUED | OUTPATIENT
Start: 2018-12-14 | End: 2018-12-15 | Stop reason: HOSPADM

## 2018-12-13 RX ORDER — MIDAZOLAM HYDROCHLORIDE 1 MG/ML
2 INJECTION, SOLUTION INTRAMUSCULAR; INTRAVENOUS ONCE
Status: COMPLETED | OUTPATIENT
Start: 2018-12-13 | End: 2018-12-13

## 2018-12-13 RX ORDER — AMOXICILLIN 250 MG
2 CAPSULE ORAL DAILY
Status: DISCONTINUED | OUTPATIENT
Start: 2018-12-14 | End: 2018-12-15 | Stop reason: HOSPADM

## 2018-12-13 RX ORDER — ACETAMINOPHEN 500 MG
1000 TABLET ORAL EVERY 6 HOURS
Status: DISCONTINUED | OUTPATIENT
Start: 2018-12-14 | End: 2018-12-15 | Stop reason: HOSPADM

## 2018-12-13 RX ORDER — HYDROMORPHONE HYDROCHLORIDE 2 MG/ML
1 INJECTION, SOLUTION INTRAMUSCULAR; INTRAVENOUS; SUBCUTANEOUS
Status: DISCONTINUED | OUTPATIENT
Start: 2018-12-13 | End: 2018-12-15 | Stop reason: HOSPADM

## 2018-12-13 RX ORDER — MIDAZOLAM HYDROCHLORIDE 1 MG/ML
INJECTION, SOLUTION INTRAMUSCULAR; INTRAVENOUS AS NEEDED
Status: DISCONTINUED | OUTPATIENT
Start: 2018-12-13 | End: 2018-12-13 | Stop reason: HOSPADM

## 2018-12-13 RX ORDER — ALBUTEROL SULFATE 0.83 MG/ML
2.5 SOLUTION RESPIRATORY (INHALATION)
Status: DISCONTINUED | OUTPATIENT
Start: 2018-12-13 | End: 2018-12-15 | Stop reason: HOSPADM

## 2018-12-13 RX ORDER — CEFAZOLIN SODIUM/WATER 2 G/20 ML
2 SYRINGE (ML) INTRAVENOUS EVERY 8 HOURS
Status: COMPLETED | OUTPATIENT
Start: 2018-12-13 | End: 2018-12-13

## 2018-12-13 RX ADMIN — PROPOFOL 75 MCG/KG/MIN: 10 INJECTION, EMULSION INTRAVENOUS at 08:27

## 2018-12-13 RX ADMIN — EPHEDRINE SULFATE 10 MG: 50 INJECTION, SOLUTION INTRAVENOUS at 08:29

## 2018-12-13 RX ADMIN — MIDAZOLAM HYDROCHLORIDE 1 MG: 1 INJECTION, SOLUTION INTRAMUSCULAR; INTRAVENOUS at 08:27

## 2018-12-13 RX ADMIN — FENTANYL CITRATE 50 MCG: 50 INJECTION INTRAMUSCULAR; INTRAVENOUS at 07:45

## 2018-12-13 RX ADMIN — ACETAMINOPHEN 1000 MG: 10 INJECTION, SOLUTION INTRAVENOUS at 16:43

## 2018-12-13 RX ADMIN — SODIUM CHLORIDE, SODIUM LACTATE, POTASSIUM CHLORIDE, AND CALCIUM CHLORIDE 75 ML/HR: 600; 310; 30; 20 INJECTION, SOLUTION INTRAVENOUS at 06:39

## 2018-12-13 RX ADMIN — SODIUM CHLORIDE, SODIUM LACTATE, POTASSIUM CHLORIDE, AND CALCIUM CHLORIDE: 600; 310; 30; 20 INJECTION, SOLUTION INTRAVENOUS at 08:12

## 2018-12-13 RX ADMIN — CELECOXIB 200 MG: 200 CAPSULE ORAL at 20:27

## 2018-12-13 RX ADMIN — HYDROMORPHONE HYDROCHLORIDE 4 MG: 2 TABLET ORAL at 11:53

## 2018-12-13 RX ADMIN — SODIUM CHLORIDE, SODIUM LACTATE, POTASSIUM CHLORIDE, AND CALCIUM CHLORIDE: 600; 310; 30; 20 INJECTION, SOLUTION INTRAVENOUS at 08:31

## 2018-12-13 RX ADMIN — ASPIRIN 81 MG: 81 TABLET, COATED ORAL at 20:28

## 2018-12-13 RX ADMIN — Medication 2 G: at 16:45

## 2018-12-13 RX ADMIN — SODIUM CHLORIDE 100 ML/HR: 900 INJECTION, SOLUTION INTRAVENOUS at 12:00

## 2018-12-13 RX ADMIN — PROPOFOL 20 MG: 10 INJECTION, EMULSION INTRAVENOUS at 08:27

## 2018-12-13 RX ADMIN — MIDAZOLAM 1 MG: 1 INJECTION INTRAMUSCULAR; INTRAVENOUS at 07:45

## 2018-12-13 RX ADMIN — BUPIVACAINE HYDROCHLORIDE 14.25 MG: 7.5 INJECTION, SOLUTION INTRASPINAL at 08:18

## 2018-12-13 RX ADMIN — GABAPENTIN 300 MG: 300 CAPSULE ORAL at 20:27

## 2018-12-13 RX ADMIN — Medication 3 G: at 08:12

## 2018-12-13 RX ADMIN — EPHEDRINE SULFATE 15 MG: 50 INJECTION, SOLUTION INTRAVENOUS at 08:32

## 2018-12-13 RX ADMIN — CELECOXIB 200 MG: 200 CAPSULE ORAL at 06:43

## 2018-12-13 RX ADMIN — DEXAMETHASONE SODIUM PHOSPHATE 10 MG: 100 INJECTION INTRAMUSCULAR; INTRAVENOUS at 08:35

## 2018-12-13 RX ADMIN — HYDROMORPHONE HYDROCHLORIDE 4 MG: 2 TABLET ORAL at 20:38

## 2018-12-13 RX ADMIN — Medication 1 AMPULE: at 20:27

## 2018-12-13 RX ADMIN — Medication 3 AMPULE: at 06:39

## 2018-12-13 RX ADMIN — LIDOCAINE HYDROCHLORIDE 20 MG: 20 INJECTION, SOLUTION EPIDURAL; INFILTRATION; INTRACAUDAL; PERINEURAL at 08:28

## 2018-12-13 RX ADMIN — MIDAZOLAM HYDROCHLORIDE 1 MG: 1 INJECTION, SOLUTION INTRAMUSCULAR; INTRAVENOUS at 08:18

## 2018-12-13 RX ADMIN — HYDROMORPHONE HYDROCHLORIDE 4 MG: 2 TABLET ORAL at 16:38

## 2018-12-13 RX ADMIN — HYDROMORPHONE HYDROCHLORIDE 1 MG: 2 INJECTION, SOLUTION INTRAMUSCULAR; INTRAVENOUS; SUBCUTANEOUS at 14:31

## 2018-12-13 RX ADMIN — TUBERCULIN PURIFIED PROTEIN DERIVATIVE 5 UNITS: 5 INJECTION, SOLUTION INTRADERMAL at 06:40

## 2018-12-13 RX ADMIN — LIDOCAINE HYDROCHLORIDE 0.1 ML: 10 INJECTION, SOLUTION INFILTRATION; PERINEURAL at 06:00

## 2018-12-13 RX ADMIN — ACETAMINOPHEN 1000 MG: 500 TABLET, FILM COATED ORAL at 22:56

## 2018-12-13 RX ADMIN — Medication 2 G: at 22:56

## 2018-12-13 RX ADMIN — ONDANSETRON 8 MG: 8 TABLET, ORALLY DISINTEGRATING ORAL at 11:53

## 2018-12-13 NOTE — PROGRESS NOTES
Problem: Self Care Deficits Care Plan (Adult)  Goal: *Acute Goals and Plan of Care (Insert Text)  GOALS:   DISCHARGE GOALS (in preparation for going home/rehab):  3 days  1. Ms. Aliya Rayo will perform one lower body dressing activity with minimal assistance required to demonstrate improved functional mobility and safety. 2.  Ms. Aliya Rayo will perform one lower body bathing activity with minimal assistance required to demonstrate improved functional mobility and safety. 3.  Ms. Aliya Rayo will perform toileting/toilet transfer with contact guard assistance to demonstrate improved functional mobility and safety. 4.  Ms. Aliya Rayo will perform shower transfer with contact guard assistance to demonstrate improved functional mobility and safety. JOINT CAMP OCCUPATIONAL THERAPY TKA: Initial Assessment 12/13/2018  INPATIENT: Hospital Day: 1  Payor: [facility] [place] MEDICARE / Plan: [facility] [place]O / Product Type: Managed Care Medicare /      NAME/AGE/GENDER: Xiang Louis is a 70 y.o. female   PRIMARY DIAGNOSIS:  Unilateral primary osteoarthritis, left knee [M17.12]   Procedure(s) and Anesthesia Type:     * LEFT KNEE ARTHROPLASTY TOTAL W/DEPUY - Spinal (Left)  ICD-10: Treatment Diagnosis:    · Pain in Left Knee (M25.562)  · Stiffness of Left Knee, Not elsewhere classified (C39.786)      ASSESSMENT:     Ms. Aliya Rayo is s/p left TKA and presents with decreased weight bearing on left LE and decreased independence with functional mobility and activities of daily living as compared to baseline level of function and safety. Patient would benefit from skilled Occupational Therapy to maximize independence and safety with self-care task and functional mobility. Pt would also benefit from education on adaptive equipment and safety precautions in preparation for going home. This section established at most recent assessment   PROBLEM LIST (Impairments causing functional limitations):  1.  Decreased Strength  2. Decreased ADL/Functional Activities  3. Decreased Transfer Abilities  4. Increased Pain  5. Increased Fatigue  6. Decreased Flexibility/Joint Mobility  7. Decreased Knowledge of Precautions   INTERVENTIONS PLANNED: (Benefits and precautions of occupational therapy have been discussed with the patient.)  1. Activities of daily living training  2. Adaptive equipment training  3. Balance training  4. Clothing management  5. Donning&doffing training  6. Theraputic activity     TREATMENT PLAN: Frequency/Duration: Follow patient 1-2 times to address above goals. Rehabilitation Potential For Stated Goals: Good     RECOMMENDED REHABILITATION/EQUIPMENT: (at time of discharge pending progress): Continue Skilled Therapy and Home Health: Physical Therapy. OCCUPATIONAL PROFILE AND HISTORY:   History of Present Injury/Illness (Reason for Referral): Pt presents this date s/p (left) TKA. Past Medical History/Comorbidities:   Ms. Zachary Ruelas  has a past medical history of Asthma, High cholesterol, History of anemia, History of hypokalemia, Hypertension, Morbid obesity ([address] Neuropathy, EMY on CPAP, Osteoarthritis, and Unspecified adverse effect of anesthesia. Ms. Zachary Ruelas  has a past surgical history that includes hx hernia repair; BILATERAL HAND CARPAL TUNNEL RELEASE (Bilateral, 9/16/2014); and RIGHT LONG FINGER TRIGGER RELEASE (Right, 9/16/2014). Social History/Living Environment:   Home Environment: Private residence  Living Alone: No  Support Systems: Family member(s)  Patient Expects to be Discharged to[de-identified] Private residence  Prior Level of Function/Work/Activity:  Independent with [employer]      Number of Personal Factors/Comorbidities that affect the Plan of Care: Brief history (0):  LOW COMPLEXITY   ASSESSMENT OF OCCUPATIONAL PERFORMANCE[de-identified]   Most Recent Physical Functioning:   Balance  Sitting: Intact  Standing: Pull to stand; With support       Gross Assessment  AROM: Generally decreased, functional(right LE)  Strength: Generally decreased, functional(right LE)          LLE AROM  L Knee Flexion: 45  L Knee Extension: 10 Coordination  Fine Motor Skills-Upper: Left Intact; Right Intact  Gross Motor Skills-Upper: Left Intact; Right Intact         Mental Status  Neurologic State: Alert  Orientation Level: Oriented X4  Cognition: Appropriate decision making  Perception: Appears intact  Perseveration: No perseveration noted  Safety/Judgement: Awareness of environment                Basic ADLs (From Assessment) Complex ADLs (From Assessment)   Basic ADL  Feeding: Independent  Oral Facial Hygiene/Grooming: Supervision  Bathing: Moderate assistance  Upper Body Dressing: Supervision  Lower Body Dressing: Maximum assistance  Toileting: Moderate assistance     Grooming/Bathing/Dressing Activities of Daily Living     Cognitive Retraining  Safety/Judgement: Awareness of environment                 Functional Transfers  Bathroom Mobility: Moderate assistance  Toilet Transfer : Moderate assistance  Shower Transfer: Moderate assistance     Bed/Mat Mobility  Supine to Sit: Moderate assistance; Additional time  Sit to Stand: Moderate assistance;Assist x2; Additional time  Bed to Chair: Moderate assistance         Physical Skills Involved:  1. Range of Motion  2. Balance  3. Strength Cognitive Skills Affected (resulting in the inability to perform in a timely and safe manner): 1. none Psychosocial Skills Affected:  1. Environmental Adaptation   Number of elements that affect the Plan of Care: 3-5:  MODERATE COMPLEXITY   CLINICAL DECISION MAKING:   [employer] AM-PAC 6 Clicks   Daily Activity Inpatient Short Form  How much help from another person does the patient currently need. .. Total A Lot A Little None   1. Putting on and taking off regular lower body clothing? [] 1   [x] 2   [] 3   [] 4   2. Bathing (including washing, rinsing, drying)? [] 1   [x] 2   [] 3   [] 4   3.   Toileting, which includes using toilet, bedpan or urinal?   [] 1   [x] 2   [] 3   [] 4   4. Putting on and taking off regular upper body clothing? [] 1   [] 2   [] 3   [x] 4   5. Taking care of personal grooming such as brushing teeth? [] 1   [] 2   [] 3   [x] 4   6. Eating meals? [] 1   [] 2   [] 3   [x] 4   © 2007, Trustees of [facility] [employer], under license to [employer]. All rights reserved     Score:  Initial:18 Most Recent: X (Date: -- )    Interpretation of Tool:  Represents activities that are increasingly more difficult (i.e. Bed mobility, Transfers, Gait). Score 24 23 22-20 19-15 14-10 9-7 6     Modifier CH CI CJ CK CL CM CN      ? Self Care:     - CURRENT STATUS: CK - 40%-59% impaired, limited or restricted    - GOAL STATUS: CJ - 20%-39% impaired, limited or restricted    - D/C STATUS:  ---------------To be determined---------------  Payor: [facility] [place]ARE / Plan[place] [facility] [place] [facility] / Product Type: Managed Care Medicare /      Medical Necessity:     · Patient is expected to demonstrate progress in range of motion, balance and functional technique to increase independence with self care. Reason for Services/Other Comments:  · Patient would benefit from skilled Occupational Therapy to maximize independence and safety with self-care task and functional mobility. .   Use of outcome tool(s) and clinical judgement create a POC that gives a: LOW COMPLEXITY            TREATMENT:   (In addition to Assessment/Re-Assessment sessions the following treatments were rendered)     Pre-treatment Symptoms/Complaints:  Pt with complaint of pain RN notified   Pain: Initial:     8 Post Session:  8     Assessment/Reassessment only, no treatment provided today    Treatment/Session Assessment:     Response to Treatment:  Pt up to chair tolerated well.     Education:  [] Home Exercises  [x] Fall Precautions  [] Hip Precautions [] Going Home Video  [x] Knee/Hip Prosthesis Review  [x] Walker Management/Safety [x] Adaptive Equipment as Needed       Interdisciplinary Collaboration:   o Physical Therapist  o Occupational Therapist  o Registered Nurse    After treatment position/precautions:   o Up in chair  o Bed/Chair-wheels locked  o Call light within reach  o RN notified  o Family at bedside     Compliance with Program/Exercises: Compliant all of the time. Recommendations/Intent for next treatment session:  Treatment next visit will focus on increasing Ms. Yeager's independence with bed mobility, transfers, self care, functional mobility, modalities for pain, and patient education.       Total Treatment Duration:  OT Patient Time In/Time Out  Time In: 1425  Time Out: Kierra 23, OT

## 2018-12-13 NOTE — OP NOTES
[facility]  Cemented Total Knee Arthroplasty  Patient:Hamida Dickson   : 1955  Medical Record [id number]  Pre-operative Diagnosis:  Unilateral primary osteoarthritis, left knee [M17.12]  Post-operative Diagnosis: Unilateral primary osteoarthritis, left knee [M17.12]    Surgeon: Hilary Tamayo MD  Assistant: Jessica Gomez PA-C    Anesthesia: Spinal    Procedure: Total Knee Arthroplasty with use of Bone Cement  The complexity of the total joint surgery requires the use of a first assistant for positioning, retraction and assistance in closure. The patient's Body mass index is 44.64 kg/m²., BMI's greater then 40 make surgical exposure and retraction extremely difficult and increase operative time. Tourniquet Time: none  EBL: 150cc  Additional Findings: Severe DJD  Releases none    Michael Britt was brought to the operating room and positioned on the operating table. She was anethestized  A mccrary catheter was placed preoperatively and IV antibiotics was administered. Prior to the incision being made a timeout was called identifying the patient, procedure ,operative side and surgeon. . The left leg was prepped and draped in the usual sterile manner  An anterior longitudinal incision was accomplished just medial to the tibial tubercle and extending approximal 6 centimeters proximal to the superior pole of the patella. A medial parapatellar capsular incision was performed. The medial capsular flap was elevated around to the insertion of the semimembranous tendon. The patella was everted and the knee flexed and externally rotated. The medial and external menisci were excised. The lateral half of the fat pad excised and the patella femoral ligament was released. The anterior cruciate ligament was resected and the posterior cruciate ligament was substituted. Using extramedullary instrumentation, the tibial cut was accomplished with appropriate posterior slope. Approxiamately 2 mm of bone was removed from the low side of the tibia. The distal femur was next addressed. A drill hole was made above the intracondylar notch. Using appropriate intramedullary instrumentation,a 6 degree valgus distal cut was accomplished. A femur was sized. The anterior and posterior cuts were then made about the distal femur. The osteophytes were removed from the tibial and femoral surfaces. The flexion and extension gaps were assessed with the appropriate spacer blocks. Additional surgical procedures included none. The flexion and extension gaps were deemed appropriately balanced. The appropriate cutting blocks were then utilized to perform the anterior chamfer, posterior chamfer and notch cuts, with appropriate lateral tranlation accomplished for the patellofemoral groove. The tibia was sized. The tibial base plate was pinned into place with the appropriate external rotation and stem site prepared. A preliminary range of motion was accomplished with the above size trial components. A polyethylene insert allowed the patient to obtain full extension as well as appropriate flexion. The patient's ligaments were stable in flexion and extension to medial and lateral stressing and the alignment was through the appropriate mechanical axis. The patella was then everted. The bone was resected appropriately for a pegged patella button. A trial reduction revealed appropriate tracking through the patellofemoral groove. All trial components were removed and the cut surfaces prepared for cementing with irrigation and debridement of the bone interstices. There were no femoral deficiencies. A short tibial stem extension was placed to augment fixation given the patient's body habitus. The implants were cemented into position and pressurized in the usual fashion. Bone and cement debris were meticulously removed. The betadine lavage protocol was used.     Jose Manuel Ferrara Miguel Lisandro knee was placed through range of motion and noted to be stable as mentioned above with the trail components. The wound was dry, therefore no drain was used. The operative knee was injected with 60cc of Naropin, 10 cc's of morphine and 1 cc of 30mg of Toradol. The capsular layer was closed using a #1 vicryl suture, while subcutaneous layers were closed using 2-0 Vicryl interrupted sutures. Finally the skin was closed using 3-0 Vicryl and skin staples, which were applied in occlusive fashion and sterile bandage applied. An Iceman cryo pad was applied on the operative leg. Sponge count and needle counts were correct. Anna Ponce left the operating room     Implants:   Implant Name Type Inv.  Item Serial No.  Lot No. LRB No. Used   CEMENT BNE HV R 40GM -- PALACOS R [id number] - [id number]  CEMENT BNE HV R 40GM -- PALACOS R [id number] [place] [address] [id number] Left 2   PAT FARHANA DOME MEDIAL 38MM -- ATTUNE - [id number]  PAT FARHANA DOME MEDIAL 38MM -- ATTUNE [id number] [facility] [id number] Left 1   STEM FARHANA [id number] -- ATTUNE - [id number]  STEM FARHANA [id number] -- ATTUNE [id number] [facility] [employer] [id number] Left 1   FEM PS SZ 5 LT FARHANA -- ATTUNE - [id number]  FEM PS SZ 5 LT FARHANA -- ATTUNE [id number] [facility] [id number] Left 1   TIB CRS FB BASE SZ 5 FARHANA -- ATTUNE - [id number]  TIB CRS FB BASE SZ 5 FARHANA -- ATTUNE [id number] [facility] [id number] Left 1   INSERT TIB FB PS SZ 5 6MM -- ATTUNE - [id number]  INSERT TIB FB PS SZ 5 6MM -- ATTUNE [id number] [facility] [id number] Left 1     Signed By: Megan Bradford MD

## 2018-12-13 NOTE — ANESTHESIA PROCEDURE NOTES
Spinal Block    Start time: 12/13/2018 8:18 AM  End time: 12/13/2018 9:20 AM  Performed by: Akosua Van MD  Authorized by: Akosua Van MD     Pre-procedure:   Indications: primary anesthetic  Preanesthetic Checklist: patient identified, risks and benefits discussed, anesthesia consent, site marked, patient being monitored and timeout performed    Timeout Time: 08:17          Spinal Block:   Patient Position:  Seated  Prep Region:  Lumbar  Prep: chlorhexidine      Location:  L4-5  Technique:  Single shot    Local Dose (mL):  3    Needle:   Needle Type:  Pencil-tip  Needle Gauge:  25 G  Attempts:  1      Events: CSF confirmed, no blood with aspiration and no paresthesia        Assessment:  Insertion:  Uncomplicated  Patient tolerance:  Patient tolerated the procedure well with no immediate complications

## 2018-12-13 NOTE — PROGRESS NOTES
Pt received to unit. Several family members at bedside. Cl complains of pain to her left foot, grimacing and appears irritable. While trying to orient to menu, cl laid in bed and kept her eyes closed, was not engaging. Menu then explained to family member who stated she would assist pt to order lunch. Pain and nausea med as ordered received by pt.

## 2018-12-13 NOTE — PROGRESS NOTES
12/13/18 1455   Oxygen Therapy   O2 Sat (%) 92 %   Pulse via Oximetry 93 beats per minute   O2 Device Room air   Joint Camp Notes Reviewed. Pt working on IS. Pt encouraged to do 10 breaths per hour while awake on IS. Good NPC. No respiratory distress noted at this time. No complications noted at this time. Pt. Has home CPAP with her.

## 2018-12-13 NOTE — PROGRESS NOTES
[address]  Face to Face Encounter    Patients Name: Anna Ponce    YOB: 1955    Ordering Physician: Jayesh Busby    Primary Diagnosis: Unilateral primary osteoarthritis, left knee [M17.12]    Date of Face to Face:   12/13/2018                                  Face to Face Encounter findings are related to primary reason for home care:   yes. 1. I certify that the patient needs intermittent care as follows: physical therapy: stretching/ROM    2. I certify that this patient is homebound, that is: 1) patient requires the use of a walker device, special transportation, or assistance of another to leave the home; or 2) patient's condition makes leaving the home medically contraindicated; and 3) patient has a normal inability to leave the home and leaving the home requires considerable and taxing effort. Patient may leave the home for infrequent and short duration for medical reasons, and occasional absences for non-medical reasons. Homebound status is due to the following functional limitations: Patient currently under activity restrictions secondary to recent surgical procedure, this hinders their ability to safely leave the home. 3. I certify that this patient is under my care and that I, or a nurse practitioner or  [id number], or clinical nurse specialist, or certified nurse midwife, working with me, had a Face-to-Face Encounter that meets the physician Face-to-Face Encounter requirements. The following are the clinical findings from the [address] [place] [address] encounter that support the need for skilled services and is a summary of the encounter: progress notes      See attached progess note      IVONNE Still  12/13/2018      THE FOLLOWING TO BE COMPLETED BY THE COMMUNITY PHYSICIAN:    I concur with the findings described above from the [facility] encounter that this patient is homebound and in need of a skilled service.     Certifying Physician: _____________________________________      Printed Certifying Physician Name: _____________________________________    Date: _________________

## 2018-12-13 NOTE — PROGRESS NOTES
SW met with pt and family to discuss dc plans. Pt plans to return home with HHPT. Pt is agreeable to [facility]. SF referral and F2F completed. Pt just purchased a rollator and BSC about a week ago. Advised pt and family that SW would discuss rollator use and if pt would be safe at home with rollator. SW did advise to ask friends or check thrift stores if rollator needed. Pt and family aware of possible dc tomorrow. No additional needs identified at this time.     [facility]

## 2018-12-13 NOTE — ROUTINE PROCESS
Teach back method used with patient concerning hibiclens wash, TB screening, incentive spirometer(1500 preop), and pain management goals.  Patient and family were provided with home discharge needs list.

## 2018-12-13 NOTE — PERIOP NOTES
TRANSFER - OUT REPORT:    Verbal report given to [facility] RN on Alexei Villa  being transferred to [facility] for routine post - op       Report consisted of patients Situation, Background, Assessment and   Recommendations(SBAR). Information from the following report(s) SBAR was reviewed with the receiving nurse. Opportunity for questions and clarification was provided.       Patient transported with:   [employer]

## 2018-12-13 NOTE — ANESTHESIA PROCEDURE NOTES
Peripheral Block    Start time: 12/13/2018 7:46 AM  End time: 12/13/2018 7:49 AM  Performed by: Shakir Pelayo MD  Authorized by: Shakir Pealyo MD       Pre-procedure: Indications: at surgeon's request and post-op pain management    Preanesthetic Checklist: patient identified, risks and benefits discussed, site marked, timeout performed, anesthesia consent given and patient being monitored    Timeout Time: 07:45          Block Type:   Block Type:   Adductor canal  Laterality:  Left  Monitoring:  Standard ASA monitoring, continuous pulse ox, frequent vital sign checks, heart rate, responsive to questions and oxygen  Injection Technique:  Single shot  Procedures: ultrasound guided    Patient Position: supine  Prep: chlorhexidine    Location:  Mid thigh  Needle Type:  Stimuplex  Needle Gauge:  21 G  Needle Localization:  Ultrasound guidance    Assessment:  Number of attempts:  1  Injection Assessment:  Incremental injection every 5 mL, no paresthesia, ultrasound image on chart, local visualized surrounding nerve on ultrasound, negative aspiration for blood and no intravascular symptoms  Patient tolerance:  Patient tolerated the procedure well with no immediate complications

## 2018-12-13 NOTE — PERIOP NOTES
TRANSFER - IN REPORT:    Verbal report received from Adilia Stanton, RN (name) on Verita Closs  being received from [facility] [place] (unit) for routine progression of care      Report consisted of patients Situation, Background, Assessment and   Recommendations(SBAR). Information from the following report(s) SBAR, Kardex, MAR, Recent Results and Med Rec Status was reviewed with the receiving nurse. Opportunity for questions and clarification was provided.

## 2018-12-13 NOTE — PROGRESS NOTES
Problem: Mobility Impaired (Adult and Pediatric)  Goal: *Acute Goals and Plan of Care (Insert Text)  GOALS (1-4 days):  (1.)Ms. Ramon Quinn will move from supine to sit and sit to supine  in bed with STAND BY ASSIST.  (2.)Ms. Ramon Quinn will transfer from bed to chair and chair to bed with STAND BY ASSIST using the least restrictive device. (3.)Ms. Ramon Quinn will ambulate with STAND BY ASSIST for 100 feet with the least restrictive device. (4.)Ms. Ramon Quinn will ambulate up/down 6 steps with left railing with MINIMAL ASSIST with device as needed. (5.)Ms. Ramon Quinn will increase left knee ROM to 5°-80°.  ________________________________________________________________________________________________    PHYSICAL THERAPY [facility] [place] tKa: Initial Assessment, PM 12/13/2018  INPATIENT: Hospital Day: 1  Payor: [facility] MEDICARE / Plan: [facility] MEDICARE HMO/PPO / Product Type: Managed Care Medicare /      NAME/AGE/GENDER: María Elena Bonilla is a 70 y.o. female   PRIMARY DIAGNOSIS:  Unilateral primary osteoarthritis, left knee [M17.12]   Procedure(s) and Anesthesia Type:     * LEFT KNEE ARTHROPLASTY TOTAL W/DEPUY - Spinal (Left)  ICD-10: Treatment Diagnosis:    · Pain in Left Knee (M25.562)  · Stiffness of Left Knee, Not elsewhere classified (M25.662)  · Difficulty in walking, Not elsewhere classified (R26.2)      ASSESSMENT:     Ms. Ramon Quinn presents with decreased rom and strength of left LE as well as decreased functional mobility and gait s/p left tka. She plans to go home with family support. This section established at most recent assessment   PROBLEM LIST (Impairments causing functional limitations):  1. Decreased Strength  2. Decreased ADL/Functional Activities  3. Decreased Transfer Abilities  4. Decreased Ambulation Ability/Technique  5. Decreased Balance  6. Increased Pain  7. Decreased Activity Tolerance  8. Decreased Flexibility/Joint Mobility  9.  Decreased [place] with Home Exercise Program INTERVENTIONS PLANNED: (Benefits and precautions of physical therapy have been discussed with the patient.)  1. Bed Mobility  2. Gait Training  3. Home Exercise Program (HEP)  4. Therapeutic Exercise/Strengthening  5. Transfer Training  6. Range of Motion: active/assisted/passive  7. Therapeutic Activities  8. Group Therapy     TREATMENT PLAN: Frequency/Duration: Follow patient BID for duration of hospital stay to address above goals. Rehabilitation Potential For Stated Goals: Fair     RECOMMENDED REHABILITATION/EQUIPMENT: (at time of discharge pending progress): Continue Skilled Therapy and Home Health: Physical Therapy. HISTORY:   History of Present Injury/Illness (Reason for Referral):  S/p left tka  Past Medical History/Comorbidities:   Ms. Nathalie Oconnor  has a past medical history of Asthma, High cholesterol, History of anemia, History of hypokalemia, Hypertension, Morbid obesity ([address] Neuropathy, EMY on CPAP, Osteoarthritis, and Unspecified adverse effect of anesthesia. Ms. Nathalie Oconnor  has a past surgical history that includes hx hernia repair; BILATERAL HAND CARPAL TUNNEL RELEASE (Bilateral, 9/16/2014); and RIGHT LONG FINGER TRIGGER RELEASE (Right, 9/16/2014). Social History/Living Environment:   Home Environment: Private residence  Living Alone: No  Support Systems: Family member(s)  Patient Expects to be Discharged to[de-identified] Private residence  Prior Level of Function/Work/Activity:  Using rollator for gait   Number of Personal Factors/Comorbidities that affect the Plan of Care: 3+: HIGH COMPLEXITY   EXAMINATION:   Most Recent Physical Functioning:      Gross Assessment  AROM: Generally decreased, functional(right LE)  Strength: Generally decreased, functional(right LE)          LLE AROM  L Knee Flexion: 45  L Knee Extension: 10          Bed Mobility  Supine to Sit: Moderate assistance; Additional time    Transfers  Sit to Stand: Moderate assistance;Assist x2; Additional time  Stand to Sit: Moderate assistance  Bed to Chair: Moderate assistance    Balance  Sitting: Intact  Standing: Pull to stand; With support              Weight Bearing Status  Left Side Weight Bearing: As tolerated  Distance (ft): 3 Feet (ft)  Ambulation - Level of Assistance: Minimal assistance  Assistive Device: Walker, rolling  Speed/Prisca: Delayed  Step Length: Left shortened;Right shortened  Stance: Left decreased  Gait Abnormalities: Antalgic;Decreased step clearance  Interventions: Safety awareness training;Verbal cues     Braces/Orthotics:     Left Knee Cold  Type: Cryocuff      Body Structures Involved:  1. Bones  2. Joints  3. Muscles  4. Ligaments Body Functions Affected:  1. Movement Related Activities and Participation Affected:  1. Mobility   Number of elements that affect the Plan of Care: 3: MODERATE COMPLEXITY   CLINICAL PRESENTATION:   Presentation: Stable and uncomplicated: LOW COMPLEXITY   CLINICAL DECISION MAKING:   [facility] AM-PAC 6 Clicks   Basic Mobility Inpatient Short Form  How much difficulty does the patient currently have. .. Unable A Lot A Little None   1. Turning over in bed (including adjusting bedclothes, sheets and blankets)? [] 1   [] 2   [x] 3   [] 4   2. Sitting down on and standing up from a chair with arms ( e.g., wheelchair, bedside commode, etc.)   [] 1   [] 2   [x] 3   [] 4   3. Moving from lying on back to sitting on the side of the bed? [] 1   [x] 2   [] 3   [] 4   How much help from another person does the patient currently need. .. Total A Lot A Little None   4. Moving to and from a bed to a chair (including a wheelchair)? [] 1   [] 2   [x] 3   [] 4   5. Need to walk in hospital room? [] 1   [x] 2   [] 3   [] 4   6. Climbing 3-5 steps with a railing? [] 1   [x] 2   [] 3   [] 4   © 2007, Trustees of [facility], under license to [employer].  All rights reserved        Score:  Initial: 15 Most Recent: X (Date: -- )    Interpretation of Tool:  Represents activities that are increasingly more difficult (i.e. Bed mobility, Transfers, Gait). Score 24 23 22-20 19-15 14-10 9-7 6     Modifier CH CI CJ CK CL CM CN      ? Mobility - Walking and Moving Around:     - CURRENT STATUS: CK - 40%-59% impaired, limited or restricted    - GOAL STATUS: CJ - 20%-39% impaired, limited or restricted    - D/C STATUS:  ---------------To be determined---------------  [facility] / Plan: [place] [facility] [place] MED[place] [place] / Product Type: Managed Care Medicare /      Medical Necessity:     · Patient is expected to demonstrate progress in strength, range of motion and balance to decrease assistance required with theraputic exercises and functional mobility. Reason for Services/Other Comments:  · Patient continues to require present interventions due to patient's inability to perform theraputic exercises and functional mobility independently. Use of outcome tool(s) and clinical judgement create a POC that gives a: Clear prediction of patient's progress: LOW COMPLEXITY            TREATMENT:   (In addition to Assessment/Re-Assessment sessions the following treatments were rendered)     Pre-treatment Symptoms/Complaints:  Knee pain  Pain: Initial:      Post Session:  8     Assessment/Reassessment only, no treatment provided today    Date:   Date:   Date:     ACTIVITY/EXERCISE AM PM AM PM AM PM   GROUP THERAPY  []  []  []  []  []  []   Ankle Pumps         Quad Sets         Gluteal Sets         Hip ABd/ADduction         Straight Leg Raises         Knee Slides         Short Arc Quads         Long Arc Quads         Chair Slides                  B = bilateral; AA = active assistive; A = active; P = passive      Treatment/Session Assessment:     Response to Treatment:  Pt. Did ok. Complained of knee pain.     Education:  [x] Home Exercises  [x] Fall Precautions  [] Hip Precautions [] D/C Instruction Review  [x] Knee/Hip Prosthesis Review  [x] Walker Management/Safety [] Adaptive Equipment as Needed       Interdisciplinary Collaboration:   o Occupational Therapist  o Registered Nurse    After treatment position/precautions:   o Up in chair  o Bed/Chair-wheels locked  o Bed in low position  o Caregiver at bedside  o Call light within reach  o Family at bedside  o Nurse at bedside    Compliance with Program/Exercises: Will assess as treatment progresses. No questions. Recommendations/Intent for next treatment session:  Treatment next visit will focus on increasing Ms. Yeager's independence with bed mobility, transfers, gait training, strength/ROM exercises, modalities for pain, and patient education.       Total Treatment Duration:  PT Patient Time In/Time Out  Time In: 8860  Time Out: 2416 [address],

## 2018-12-13 NOTE — ANESTHESIA PREPROCEDURE EVALUATION
Anesthetic History   No history of anesthetic complications            Review of Systems / Medical History  Patient summary reviewed and pertinent labs reviewed    Pulmonary        Sleep apnea: CPAP    Asthma (prn MDI) : well controlled       Neuro/Psych   Within defined limits           Cardiovascular    Hypertension: well controlled              Exercise tolerance: <4 METS: Limited by DJD     GI/Hepatic/Renal  Within defined limits              Endo/Other        Morbid obesity and arthritis     Other Findings              Physical Exam    Airway  Mallampati: II  TM Distance: > 6 cm  Neck ROM: normal range of motion   Mouth opening: Normal     Cardiovascular    Rhythm: regular  Rate: normal         Dental    Dentition: Full upper dentures and Lower partial plate     Pulmonary  Breath sounds clear to auscultation               Abdominal         Other Findings            Anesthetic Plan    ASA: 3  Anesthesia type: spinal      Post-op pain plan if not by surgeon: peripheral nerve block single      Anesthetic plan and risks discussed with: Patient

## 2018-12-13 NOTE — ANESTHESIA POSTPROCEDURE EVALUATION
Procedure(s):  LEFT KNEE ARTHROPLASTY TOTAL W/DEPUY. Anesthesia Post Evaluation      Multimodal analgesia: multimodal analgesia used between 6 hours prior to anesthesia start to PACU discharge  Patient location during evaluation: PACU  Patient participation: complete - patient participated  Level of consciousness: awake  Pain management: adequate  Airway patency: patent  Anesthetic complications: no  Cardiovascular status: acceptable and hemodynamically stable  Respiratory status: acceptable  Hydration status: acceptable  Comments: Acceptable for discharge from PACU.         Visit Vitals  /65 (BP 1 Location: Right arm, BP Patient Position: At rest)   Pulse 81   Temp 36.3 °C (97.3 °F)   Resp 16   Ht 5' 7\" (1.702 m)   Wt 129.3 kg (284 lb 16 oz)   SpO2 100%   BMI 44.64 kg/m²

## 2018-12-13 NOTE — PROGRESS NOTES
TRANSFER - IN REPORT:    Verbal report received from Yasir Anthony RN(name) on Jed Meyer  being received from PACU(unit) for routine post - op      Report consisted of patients Situation, Background, Assessment and   Recommendations(SBAR). Information from the following report(s) SBAR, Kardex, OR Summary, Procedure Summary, Intake/Output and MAR was reviewed with the receiving nurse. Opportunity for questions and clarification was provided. Assessment completed upon patients arrival to unit and care assumed.

## 2018-12-13 NOTE — INTERVAL H&P NOTE
H&P Update:  Angelic Ruiz was seen and examined. History and physical has been reviewed. The patient has been examined.  There have been no significant clinical changes since the completion of the originally dated History and Physical.    Signed By: JAMI Fuentes     December 13, 2018 7:12 AM

## 2018-12-13 NOTE — PERIOP NOTES
Betadine Lavage:  17.5cc of betadine lot #  [id number],   Exp date 12/2019 ,  In 500cc of .9ns Lot # [id number], exp.  Date   08/01/2021,

## 2018-12-13 NOTE — PERIOP NOTES
TRANSFER - OUT REPORT:    Verbal report given to Tacho Singh RN on Arvin Leonard  being transferred to [facility] for routine progression of care       Report consisted of patients Situation, Background, Assessment and   Recommendations(SBAR). Information from the following report(s) Kardex, MAR and Recent Results was reviewed with the receiving nurse. Lines:   Peripheral IV 61/37/36 Left Cephalic (Active)   Site Assessment Clean, dry, & intact 12/13/2018  6:46 AM   Phlebitis Assessment 0 12/13/2018  6:46 AM   Infiltration Assessment 0 12/13/2018  6:46 AM   Dressing Status Clean, dry, & intact 12/13/2018  6:46 AM   Dressing Type Tape;Transparent 12/13/2018  6:46 AM   Hub Color/Line Status Pink; Infusing;Patent 12/13/2018  6:46 AM   Action Taken Blood drawn 12/13/2018  6:46 AM        Opportunity for questions and clarification was provided.       Patient transported with:   [employer]

## 2018-12-14 PROBLEM — Z96.652 S/P TKR (TOTAL KNEE REPLACEMENT) USING CEMENT, LEFT: Status: ACTIVE | Noted: 2018-12-14

## 2018-12-14 LAB
MM INDURATION POC: 0 MM (ref 0–5)
PPD POC: NORMAL NEGATIVE

## 2018-12-14 PROCEDURE — 97116 GAIT TRAINING THERAPY: CPT

## 2018-12-14 PROCEDURE — 74011250636 HC RX REV CODE- 250/636: Performed by: ORTHOPAEDIC SURGERY

## 2018-12-14 PROCEDURE — 94760 N-INVAS EAR/PLS OXIMETRY 1: CPT

## 2018-12-14 PROCEDURE — 74011250637 HC RX REV CODE- 250/637: Performed by: ORTHOPAEDIC SURGERY

## 2018-12-14 PROCEDURE — 65270000029 HC RM PRIVATE

## 2018-12-14 PROCEDURE — 97150 GROUP THERAPEUTIC PROCEDURES: CPT

## 2018-12-14 PROCEDURE — 97535 SELF CARE MNGMENT TRAINING: CPT

## 2018-12-14 PROCEDURE — 97110 THERAPEUTIC EXERCISES: CPT

## 2018-12-14 RX ORDER — HYDROMORPHONE HYDROCHLORIDE 2 MG/1
2-4 TABLET ORAL
Qty: 60 TAB | Refills: 0 | Status: SHIPPED | OUTPATIENT
Start: 2018-12-14

## 2018-12-14 RX ORDER — ASPIRIN 81 MG/1
81 TABLET ORAL EVERY 12 HOURS
Qty: 60 TAB | Refills: 0 | Status: SHIPPED | OUTPATIENT
Start: 2018-12-14 | End: 2019-01-13

## 2018-12-14 RX ADMIN — Medication 5 ML: at 22:04

## 2018-12-14 RX ADMIN — GABAPENTIN 300 MG: 300 CAPSULE ORAL at 22:03

## 2018-12-14 RX ADMIN — CELECOXIB 200 MG: 200 CAPSULE ORAL at 08:21

## 2018-12-14 RX ADMIN — CELECOXIB 200 MG: 200 CAPSULE ORAL at 22:03

## 2018-12-14 RX ADMIN — HYDROMORPHONE HYDROCHLORIDE 2 MG: 2 TABLET ORAL at 17:26

## 2018-12-14 RX ADMIN — ASPIRIN 81 MG: 81 TABLET, COATED ORAL at 08:21

## 2018-12-14 RX ADMIN — ASPIRIN 81 MG: 81 TABLET, COATED ORAL at 22:03

## 2018-12-14 RX ADMIN — HYDROMORPHONE HYDROCHLORIDE 1 MG: 2 INJECTION, SOLUTION INTRAMUSCULAR; INTRAVENOUS; SUBCUTANEOUS at 08:14

## 2018-12-14 RX ADMIN — Medication 1 AMPULE: at 22:03

## 2018-12-14 RX ADMIN — Medication 1 AMPULE: at 08:21

## 2018-12-14 RX ADMIN — SENNOSIDES AND DOCUSATE SODIUM 2 TABLET: 8.6; 5 TABLET ORAL at 08:21

## 2018-12-14 RX ADMIN — ACETAMINOPHEN 1000 MG: 500 TABLET, FILM COATED ORAL at 17:26

## 2018-12-14 RX ADMIN — HYDROMORPHONE HYDROCHLORIDE 2 MG: 2 TABLET ORAL at 22:03

## 2018-12-14 RX ADMIN — ACETAMINOPHEN 1000 MG: 500 TABLET, FILM COATED ORAL at 12:10

## 2018-12-14 RX ADMIN — HYDROMORPHONE HYDROCHLORIDE 4 MG: 2 TABLET ORAL at 10:19

## 2018-12-14 RX ADMIN — TRIAMTERENE AND HYDROCHLOROTHIAZIDE 1 TABLET: 37.5; 25 TABLET ORAL at 08:20

## 2018-12-14 RX ADMIN — ACETAMINOPHEN 1000 MG: 500 TABLET, FILM COATED ORAL at 06:41

## 2018-12-14 NOTE — PROGRESS NOTES
Problem: Mobility Impaired (Adult and Pediatric)  Goal: *Acute Goals and Plan of Care (Insert Text)  GOALS (1-4 days):  (1.)Ms. Nathalie Oconnor will move from supine to sit and sit to supine  in bed with STAND BY ASSIST.  (2.)Ms. Nathalie Oconnor will transfer from bed to chair and chair to bed with STAND BY ASSIST using the least restrictive device. (3.)Ms. Nathalie Oconnor will ambulate with STAND BY ASSIST for 100 feet with the least restrictive device. (4.)Ms. Nathalie Oconnor will ambulate up/down 6 steps with left railing with MINIMAL ASSIST with device as needed. (5.)Ms. Nathalie Oconnor will increase left knee ROM to 5°-80°.  ________________________________________________________________________________________________    PHYSICAL THERAPY [facility] [place] tKa: Daily Note, AM 12/14/2018  INPATIENT: Hospital Day: 2  Payor: [facility] MEDIC[facility] / Plan: [facility] MEDICARE HMO/PPO / Product Type: Managed Care Medicare /      NAME/AGE/GENDER: Verita Closs is a 70 y.o. female   PRIMARY DIAGNOSIS:  Unilateral primary osteoarthritis, left knee [M17.12]   Procedure(s) and Anesthesia Type:     * LEFT KNEE ARTHROPLASTY TOTAL W/DEPUY - Spinal (Left)  ICD-10: Treatment Diagnosis:    · Pain in Left Knee (M25.562)  · Stiffness of Left Knee, Not elsewhere classified (M25.662)  · Difficulty in walking, Not elsewhere classified (R26.2)      ASSESSMENT:     Ms. Nathalie Oconnor presents with decreased rom and strength of left LE as well as decreased functional mobility and gait s/p left tka. She plans to go home with family support. She is supine upon arrival.  She goes from supine>EOB> with Min A. She is very tearful this morning because she is having pain. She performs exercises with help. She will sit up for awhile and then come to group. This section established at most recent assessment   PROBLEM LIST (Impairments causing functional limitations):  1. Decreased Strength  2. Decreased ADL/Functional Activities  3. Decreased Transfer Abilities  4.  Decreased Ambulation Ability/Technique  5. Decreased Balance  6. Increased Pain  7. Decreased Activity Tolerance  8. Decreased Flexibility/Joint Mobility  9. Decreased [place] with Home Exercise Program   INTERVENTIONS PLANNED: (Benefits and precautions of physical therapy have been discussed with the patient.)  1. Bed Mobility  2. Gait Training  3. Home Exercise Program (HEP)  4. Therapeutic Exercise/Strengthening  5. Transfer Training  6. Range of Motion: active/assisted/passive  7. Therapeutic Activities  8. Group Therapy     TREATMENT PLAN: Frequency/Duration: Follow patient BID for duration of hospital stay to address above goals. Rehabilitation Potential For Stated Goals: Fair     RECOMMENDED REHABILITATION/EQUIPMENT: (at time of discharge pending progress): Continue Skilled Therapy and Home Health: Physical Therapy. HISTORY:   History of Present Injury/Illness (Reason for Referral):  S/p left tka  Past Medical History/Comorbidities:   Ms. Nathalie Oconnor  has a past medical history of Asthma, High cholesterol, History of anemia, History of hypokalemia, Hypertension, Morbid obesity ([address] Neuropathy, EMY on CPAP, Osteoarthritis, and Unspecified adverse effect of anesthesia. Ms. Nathalie Oconnor  has a past surgical history that includes hx hernia repair; BILATERAL HAND CARPAL TUNNEL RELEASE (Bilateral, 9/16/2014); and RIGHT LONG FINGER TRIGGER RELEASE (Right, 9/16/2014).   Social History/Living Environment:   Home Environment: Private residence  # Steps to Enter: 6  One/Two Story Residence: One story  Living Alone: No  Support Systems: Family member(s)  Patient Expects to be Discharged to[de-identified] Private residence  Current DME Used/Available at Home: Theresofya Gallagher, new  Prior Level of Function/Work/Activity:  Using rollator for gait   Number of Personal Factors/Comorbidities that affect the Plan of Care: 3+: HIGH COMPLEXITY   EXAMINATION:   Most Recent Physical Functioning:                            Bed Mobility  Supine to Sit: Minimum assistance  Sit to Supine: (left up in chair)    Transfers  Sit to Stand: Minimum assistance  Stand to Sit: Minimum assistance  Bed to Chair: Minimum assistance                   Weight Bearing Status  Left Side Weight Bearing: As tolerated  Distance (ft): 10 Feet (ft)  Ambulation - Level of Assistance: Minimal assistance  Assistive Device: Walker, rolling  Speed/Prisca: Delayed  Step Length: Left shortened;Right shortened  Stance: Right decreased  Gait Abnormalities: Antalgic;Decreased step clearance  Interventions: Safety awareness training     Braces/Orthotics:     Left Knee Cold  Type: Cryocuff      Body Structures Involved:  1. Bones  2. Joints  3. Muscles  4. Ligaments Body Functions Affected:  1. Movement Related Activities and Participation Affected:  1. Mobility   Number of elements that affect the Plan of Care: 3: MODERATE COMPLEXITY   CLINICAL PRESENTATION:   Presentation: Stable and uncomplicated: LOW COMPLEXITY   CLINICAL DECISION MAKIN[address] [place] [id number] AM-PAC 6 Clicks   Basic Mobility Inpatient Short Form  How much difficulty does the patient currently have. .. Unable A Lot A Little None   1. Turning over in bed (including adjusting bedclothes, sheets and blankets)? [] 1   [] 2   [x] 3   [] 4   2. Sitting down on and standing up from a chair with arms ( e.g., wheelchair, bedside commode, etc.)   [] 1   [] 2   [x] 3   [] 4   3. Moving from lying on back to sitting on the side of the bed? [] 1   [x] 2   [] 3   [] 4   How much help from another person does the patient currently need. .. Total A Lot A Little None   4. Moving to and from a bed to a chair (including a wheelchair)? [] 1   [] 2   [x] 3   [] 4   5. Need to walk in hospital room? [] 1   [x] 2   [] 3   [] 4   6. Climbing 3-5 steps with a railing? [] 1   [x] 2   [] 3   [] 4   © , Trustees of [address] [place] [address], under license to [employer].  All rights reserved        Score:  Initial: 15 Most Recent: X (Date: -- )    Interpretation of Tool:  Represents activities that are increasingly more difficult (i.e. Bed mobility, Transfers, Gait). Score 24 23 22-20 19-15 14-10 9-7 6     Modifier CH CI CJ CK CL CM CN      ? Mobility - Walking and Moving Around:     - CURRENT STATUS: CK - 40%-59% impaired, limited or restricted    - GOAL STATUS: CJ - 20%-39% impaired, limited or restricted    - D/C STATUS:  ---------------To be determined---------------  Payor: [facility] MEDICARE / Plan[place] [employer] [facility] [place] / Product Type: Managed Care Medicare /      Medical Necessity:     · Patient is expected to demonstrate progress in strength, range of motion and balance to decrease assistance required with theraputic exercises and functional mobility. Reason for Services/Other Comments:  · Patient continues to require present interventions due to patient's inability to perform theraputic exercises and functional mobility independently. Use of outcome tool(s) and clinical judgement create a POC that gives a: Clear prediction of patient's progress: LOW COMPLEXITY            TREATMENT:   (In addition to Assessment/Re-Assessment sessions the following treatments were rendered)     Pre-treatment Symptoms/Complaints:  Knee pain  Pain: Initial:   Pain Intensity 1: 5(8/10 after therapy)  Post Session:      Gait Training (15 Minutes):  Gait training to improve and/or restore physical functioning as related to mobility. Ambulated 10 Feet (ft) with Minimal assistance using a Walker, rolling and minimal Safety awareness training related to their knee position and motion to promote proper body alignment. Therapeutic Exercise: (15 Minutes):  Exercises per grid below to improve strength. Required minimal verbal cues to promote proper body alignment. Progressed range as indicated.    Date:  12/14   Date:   Date:     ACTIVITY/EXERCISE AM PM AM PM AM PM   GROUP THERAPY  []  []  []  []  []  []   Ankle Pumps 15 Quad Sets 15        Gluteal Sets 15        Hip ABd/ADduction 15        Straight Leg Raises 15 aa        Knee Slides 15 aa        Short Arc Quads 15 aa        Long Arc Quads         Chair Slides                  B = bilateral; AA = active assistive; A = active; P = passive      Treatment/Session Assessment:     Response to Treatment:  Pt. Doing fair    Education:  [x] Home Exercises  [x] Fall Precautions  [] Hip Precautions [] D/C Instruction Review  [x] Knee/Hip Prosthesis Review  [x] Walker Management/Safety [] Adaptive Equipment as Needed       Interdisciplinary Collaboration:   o Registered Nurse    After treatment position/precautions:   o Up in chair  o Bed/Chair-wheels locked  o Bed in low position  o Caregiver at bedside  o Call light within reach  o Family at bedside  o Nurse at bedside    Compliance with Program/Exercises: Will assess as treatment progresses. No questions. Recommendations/Intent for next treatment session:  Treatment next visit will focus on increasing Ms. Yeager's independence with bed mobility, transfers, gait training, strength/ROM exercises, modalities for pain, and patient education.       Total Treatment Duration:  PT Patient Time In/Time Out  Time In: 0730  Time Out: 0800    Bita Delvalle, PTA

## 2018-12-14 NOTE — PROGRESS NOTES
Initial visit with patient. Patient in pain and tearful. Prayer and support given. Elisa larios. Danette Montero M.Div.

## 2018-12-14 NOTE — PROGRESS NOTES
Problem: Self Care Deficits Care Plan (Adult)  Goal: *Acute Goals and Plan of Care (Insert Text)  GOALS:   DISCHARGE GOALS (in preparation for going home/rehab):  3 days  1. Ms. Robert Restrepo will perform one lower body dressing activity with minimal assistance required to demonstrate improved functional mobility and safety. -GOAL MET 12/14/2018    2. Ms. Robert Restrepo will perform one lower body bathing activity with minimal assistance required to demonstrate improved functional mobility and safety. -GOAL MET 12/14/2018  3. Ms. Robert Restrepo will perform toileting/toilet transfer with contact guard assistance to demonstrate improved functional mobility and safety. PROGRESSING   4. Ms. Robert Restrepo will perform shower transfer with contact guard assistance to demonstrate improved functional mobility and safety. -Progressing    JOINT CAMP OCCUPATIONAL THERAPY TKA: Treatment Day: 1st 12/14/2018  INPATIENT: Hospital Day: 2  Payor: [facility] [place] MEDICARE / Plan: Desirae García [facility] MEDICARE HMO/PPO / Product Type: Managed Care Medicare /      NAME/AGE/GENDER: Miriam Granda is a 70 y.o. female   PRIMARY DIAGNOSIS:  Unilateral primary osteoarthritis, left knee [M17.12]   Procedure(s) and Anesthesia Type:     * LEFT KNEE ARTHROPLASTY TOTAL W/DEPUY - Spinal (Left)  ICD-10: Treatment Diagnosis:    · Pain in Left Knee (M25.562)  · Stiffness of Left Knee, Not elsewhere classified (E56.481)      ASSESSMENT:      Ms. Robert Restrepo is s/p Left TKA and presents with decreased weight bearing on L LE and decreased independence with functional mobility and activities of daily living. Patient completed shower and dressing as charter below in ADL grid and is ambulating with rolling walker and minimal assist assist.  Patient has met 2/4 goals and plans to return home with good family support. Family able to provide patient with appropriate level of assistance at this time.   OT reviewed safety precautions throughout session and therapy schedule for the remainder of today. Patient instructed to call for assistance when needing to get up from recliner and all needs in reach. Patient verbalized understanding of call light. Progressing towards goals. This section established at most recent assessment   PROBLEM LIST (Impairments causing functional limitations):  1. Decreased Strength  2. Decreased ADL/Functional Activities  3. Decreased Transfer Abilities  4. Increased Pain  5. Increased Fatigue  6. Decreased Flexibility/Joint Mobility  7. Decreased Knowledge of Precautions   INTERVENTIONS PLANNED: (Benefits and precautions of occupational therapy have been discussed with the patient.)  1. Activities of daily living training  2. Adaptive equipment training  3. Balance training  4. Clothing management  5. Donning&doffing training  6. Theraputic activity     TREATMENT PLAN: Frequency/Duration: Follow patient 1-2 times to address above goals. Rehabilitation Potential For Stated Goals: Good     RECOMMENDED REHABILITATION/EQUIPMENT: (at time of discharge pending progress): Continue Skilled Therapy and Home Health: Physical Therapy. OCCUPATIONAL PROFILE AND HISTORY:   History of Present Injury/Illness (Reason for Referral): Pt presents this date s/p (left) TKA. Past Medical History/Comorbidities:   Ms. Tony Herrera  has a past medical history of Asthma, High cholesterol, History of anemia, History of hypokalemia, Hypertension, Morbid obesity ([address] Neuropathy, EMY on CPAP, Osteoarthritis, and Unspecified adverse effect of anesthesia. Ms. Tony Herrera  has a past surgical history that includes hx hernia repair; BILATERAL HAND CARPAL TUNNEL RELEASE (Bilateral, 9/16/2014); and RIGHT LONG FINGER TRIGGER RELEASE (Right, 9/16/2014).   Social History/Living Environment:   Home Environment: Private residence  # Steps to Enter: 6  One/Two Story Residence: One story  Living Alone: No  Support Systems: Family member(s)  Patient Expects to be Discharged to[employer] residence  Current DME Used/Available at Home: new Yi  Prior Level of Function/Work/Activity:  Independent with [employer]      Number of Personal Factors/Comorbidities that affect the Plan of Care: Brief history (0):  LOW COMPLEXITY   ASSESSMENT OF OCCUPATIONAL PERFORMANCE[de-identified]   Most Recent Physical Functioning:   Balance  Sitting: Intact  Standing: With support                    Coordination  Fine Motor Skills-Upper: Left Intact; Right Intact  Gross Motor Skills-Upper: Left Intact; Right Intact         Mental Status  Neurologic State: Alert;Drowsy  Orientation Level: Oriented X4  Cognition: (P) Appropriate decision making; Appropriate for age attention/concentration; Appropriate safety awareness                Basic ADLs (From Assessment) Complex ADLs (From Assessment)   Basic ADL  Feeding: Independent  Oral Facial Hygiene/Grooming: Setup  Bathing: Minimum assistance  Type of Bath: Chlorhexidine (CHG), Full, Shower  Upper Body Dressing: Setup  Lower Body Dressing: Minimum assistance  Toileting: Minimum assistance     Grooming/Bathing/Dressing Activities of Daily Living                       Functional Transfers  Bathroom Mobility: Minimum assistance  Toilet Transfer : Minimum assistance  Shower Transfer: Minimum assistance     Bed/Mat Mobility  Supine to Sit: Minimum assistance  Sit to Supine: (left up in chair)  Sit to Stand: Minimum assistance  Bed to Chair: Minimum assistance         Physical Skills Involved:  1. Range of Motion  2. Balance  3. Strength Cognitive Skills Affected (resulting in the inability to perform in a timely and safe manner): 1. none Psychosocial Skills Affected:  1. Environmental Adaptation   Number of elements that affect the Plan of Care: 3-5:  MODERATE COMPLEXITY   CLINICAL DECISION MAKING:   [employer] AM-PAC 6 Clicks   Daily Activity Inpatient Short Form  How much help from another person does the patient currently need. .. Total A Lot A Little None   1.   Putting on and taking off regular lower body clothing? [] 1   [x] 2   [] 3   [] 4   2. Bathing (including washing, rinsing, drying)? [] 1   [x] 2   [] 3   [] 4   3. Toileting, which includes using toilet, bedpan or urinal?   [] 1   [x] 2   [] 3   [] 4   4. Putting on and taking off regular upper body clothing? [] 1   [] 2   [] 3   [x] 4   5. Taking care of personal grooming such as brushing teeth? [] 1   [] 2   [] 3   [x] 4   6. Eating meals? [] 1   [] 2   [] 3   [x] 4   © 2007, Trustees of [address] [place] [address] [address] [address], under license to [employer]. All rights reserved     Score:  Initial:18 Most Recent: X (Date: -- )    Interpretation of Tool:  Represents activities that are increasingly more difficult (i.e. Bed mobility, Transfers, Gait). Score 24 23 22-20 19-15 14-10 9-7 6     Modifier CH CI CJ CK CL CM CN      ? Self Care:     - CURRENT STATUS: CK - 40%-59% impaired, limited or restricted    - GOAL STATUS: CJ - 20%-39% impaired, limited or restricted    - D/C STATUS:  ---------------To be determined---------------  Payor: [facility] [place] / [place] [facility] [place] [facility] / Product Type: Managed Care Medicare /      Medical Necessity:     · Patient is expected to demonstrate progress in range of motion, balance and functional technique to increase independence with self care. Reason for Services/Other Comments:  · Patient would benefit from skilled Occupational Therapy to maximize independence and safety with self-care task and functional mobility.  .   Use of outcome tool(s) and clinical judgement create a POC that gives a: LOW COMPLEXITY            TREATMENT:   (In addition to Assessment/Re-Assessment sessions the following treatments were rendered)     Pre-treatment Symptoms/Complaints:  Pt with complaint of pain RN notified   Pain: Initial:   Pain Intensity 1: 8 8 Post Session:  8     Self Care: (55): Procedure(s) (per grid) utilized to improve and/or restore self-care/home management as related to dressing, bathing, toileting and grooming. Required minimal verbal and tactile cueing to facilitate activities of daily living skills. Treatment/Session Assessment:     Response to Treatment:  Pt up to chair tolerated well. Education:  [] Home Exercises  [x] Fall Precautions  [] Hip Precautions [] Going Home Video  [x] Knee/Hip Prosthesis Review  [x] Walker Management/Safety [x] Adaptive Equipment as Needed       Interdisciplinary Collaboration:   o Physical Therapist  o Occupational Therapist  o Registered Nurse    After treatment position/precautions:   o Up in chair  o Bed/Chair-wheels locked  o Call light within reach  o RN notified  o Family at bedside     Compliance with Program/Exercises: Compliant all of the time. Recommendations/Intent for next treatment session:  Treatment next visit will focus on increasing Ms. Yeager's independence with bed mobility, transfers, self care, functional mobility, modalities for pain, and patient education.       Total Treatment Duration:  OT Patient Time In/Time Out  Time In: 0915  Time Out: [address] [place], [place]

## 2018-12-14 NOTE — PROGRESS NOTES
Orthopedic Joint Progress Note    2018  Admit Date: 2018  Admit Diagnosis: Unilateral primary osteoarthritis, left knee [M17.12]    1 Day Post-Op    Subjective:     Elver Junior awake and alert    Review of Systems: Pertinent items are noted in HPI. Objective:     PT/OT:     PATIENT MOBILITY    Bed Mobility  Supine to Sit: Moderate assistance, Additional time  Transfers  Sit to Stand: Moderate assistance, Assist x2, Additional time  Stand to Sit: Moderate assistance  Bed to Chair: Moderate assistance      Gait  Speed/Prisca: Delayed  Step Length: Left shortened, Right shortened  Stance: Left decreased  Gait Abnormalities: Antalgic, Decreased step clearance  Ambulation - Level of Assistance: Minimal assistance  Distance (ft): 3 Feet (ft)  Assistive Device: Walker, rolling  Interventions: Safety awareness training, Verbal cues   Weight Bearing Status  Left Side Weight Bearing: As tolerated        Vital Signs:    Blood pressure 139/85, pulse 87, temperature 98.1 °F (36.7 °C), resp. rate 16, height 5' 7\" (1.702 m), weight 129.3 kg (284 lb 16 oz), SpO2 96 %, not currently breastfeeding.   Temp (24hrs), Av.9 °F (36.6 °C), Min:97.3 °F (36.3 °C), Max:98.4 °F (36.9 °C)      Pain Control:   Pain Assessment  Pain Scale 1: Numeric (0 - 10)  Pain Intensity 1: 7  Pain Onset 1: years  Pain Location 1: Knee  Pain Orientation 1: Left  Pain Description 1: Constant, Stabbing, Aching, Sore  Pain Intervention(s) 1: Medication (see MAR)    Meds:  Current Facility-Administered Medications   Medication Dose Route Frequency    albuterol (PROVENTIL VENTOLIN) nebulizer solution 2.5 mg  2.5 mg Nebulization Q6H PRN    gabapentin (NEURONTIN) capsule 300 mg  300 mg Oral QHS    alcohol 62% (NOZIN) nasal  1 Ampule  1 Ampule Topical Q12H    0.9% sodium chloride infusion  100 mL/hr IntraVENous CONTINUOUS    sodium chloride (NS) flush 5-10 mL  5-10 mL IntraVENous Q8H    sodium chloride (NS) flush 5-10 mL  5-10 mL IntraVENous PRN    acetaminophen (TYLENOL) tablet 1,000 mg  1,000 mg Oral Q6H    celecoxib (CELEBREX) capsule 200 mg  200 mg Oral Q12H    HYDROmorphone (PF) (DILAUDID) injection 1 mg  1 mg IntraVENous Q3H PRN    naloxone (NARCAN) injection 0.2-0.4 mg  0.2-0.4 mg IntraVENous Q10MIN PRN    dexamethasone (DECADRON) injection 10 mg  10 mg IntraVENous ONCE    promethazine (PHENERGAN) tablet 25 mg  25 mg Oral Q6H PRN    diphenhydrAMINE (BENADRYL) capsule 25 mg  25 mg Oral Q4H PRN    senna-docusate (PERICOLACE) 8.6-50 mg per tablet 2 Tab  2 Tab Oral DAILY    zolpidem (AMBIEN) tablet 5 mg  5 mg Oral QHS PRN    aspirin delayed-release tablet 81 mg  81 mg Oral Q12H    HYDROmorphone (DILAUDID) tablet 2-4 mg  2-4 mg Oral Q4H PRN    ondansetron (ZOFRAN ODT) tablet 8 mg  8 mg Oral Q8H PRN    triamterene-hydroCHLOROthiazide (MAXZIDE) 37.5-25 mg per tablet 1 Tab  1 Tab Oral DAILY        LAB:    Lab Results   Component Value Date/Time    INR 1.0 11/19/2018 09:15 AM     Lab Results   Component Value Date/Time    HGB 11.4 (L) 12/13/2018 07:40 PM    HGB 13.1 11/19/2018 09:15 AM    HGB 11.5 (L) 09/11/2014 03:50 PM       Wound Hand Right (Active)   Number of days: 1550       Wound Hand Left (Active)   Number of days: 1550       Wound Knee Left (Active)   DRESSING STATUS Clean, dry, and intact 12/13/2018  8:15 PM   DRESSING TYPE Aquacel 12/13/2018  8:15 PM   Number of days: 1         Physical Exam:  Calves soft/ neuro intact      Assessment:      Principal Problem:    S/P TKR (total knee replacement) using cement, left (12/14/2018)    Active Problems:    Arthritis of knee, left (12/13/2018)         Plan:     Continue PT/OT/Rehab  Consult: Rehab team including PT, OT, recreational therapy, and    Home soon    Patient Expects to be Discharged to[de-identified] Private residence     Signed By: Louie Devries MD

## 2018-12-14 NOTE — DISCHARGE SUMMARY
[address] [place] [address]  Total Joint Discharge Summary      Patient ID:  Edith Navarro  [id number]  1 y.o.  1955    Admit date: 12/13/2018  Discharge date and time: 12/14/18  Admitting Physician: Julia Rose MD  Surgeon: Same  Admission Diagnoses: Unilateral primary osteoarthritis, left knee [M17.12]  Discharge Diagnoses: Principal Problem:    S/P TKR (total knee replacement) using cement, left (12/14/2018)    Active Problems:    Arthritis of knee, left (12/13/2018)                                Perioperative Antibiotics: Ancef 1 to 2 mg was given depending on patient's weight. If allergic to Ancef or due to other indications, patient was given Vancomycin. Hospital Medications given:   [unfilled]  [unfilled]  [unfilled]    Discharge Medications given:  Current Discharge Medication List      START taking these medications    Details   aspirin delayed-release 81 mg tablet Take 1 Tab by mouth every twelve (12) hours every twelve (12) hours for 30 days. Qty: 60 Tab, Refills: 0      HYDROmorphone (DILAUDID) 2 mg tablet Take 1-2 Tabs by mouth every four (4) hours as needed. Max Daily Amount: 24 mg. Qty: 60 Tab, Refills: 0    Associated Diagnoses: S/P TKR (total knee replacement) using cement, left         CONTINUE these medications which have NOT CHANGED    Details   traMADol (ULTRAM) 50 mg tablet Take 50 mg by mouth every six (6) hours as needed for Pain.      triamterene-hydroCHLOROthiazide (DYAZIDE) 37.5-25 mg per capsule Take 1 Cap by mouth daily. albuterol (PROAIR HFA) 90 mcg/actuation inhaler Take  by inhalation every four (4) hours as needed for Wheezing. gabapentin (NEURONTIN) 300 mg capsule Take 300 mg by mouth nightly.               Additional DVT Prophylaxis:  VENESSA Quicke,Plexi-Pulse    Postoperative transfusions:   none  Post Op complications: none    Hemoglobin at discharge:   Lab Results   Component Value Date/Time    HGB 11.4 (L) 12/13/2018 07:40 PM       Wound appears to be healing without any evidence of infection. Physical Therapy started on the day following surgery and progressed to independent ambulation with the aid of a walker. At the time of discharge, able to go up and down stairs and had understanding of precautions needed following surgery.       PT/OT:            Assistive Device: Walker (comment)        LLE AROM  L Knee Flexion: 45  L Knee Extension: 10       Discharged to: home    Discharge instructions:  -Rx pain medication given  - Anticoagulate with: Ecotrin 81 mg PO BID x 4 weeks  -Resume pre hospital diet             -Resume home medications per medical continuation form     -Ambulate with walker, appropriate total joint protocol  -Follow up in office as scheduled       Signed:  Phoebe Cardona MD  12/14/2018  7:58 AM

## 2018-12-14 NOTE — PROGRESS NOTES
Problem: Mobility Impaired (Adult and Pediatric)  Goal: *Acute Goals and Plan of Care (Insert Text)  GOALS (1-4 days):  (1.)Ms. Miguel Mcmahon will move from supine to sit and sit to supine  in bed with STAND BY ASSIST.  (2.)Ms. Miguel Mcmahon will transfer from bed to chair and chair to bed with STAND BY ASSIST using the least restrictive device. (3.)Ms. Miguel Mcmahon will ambulate with STAND BY ASSIST for 100 feet with the least restrictive device. (4.)Ms. Miguel Mcmahon will ambulate up/down 6 steps with left railing with MINIMAL ASSIST with device as needed. (5.)Ms. Miguel Mcmahon will increase left knee ROM to 5°-80°.  ________________________________________________________________________________________________    PHYSICAL THERAPY Joint camp tKa: Daily Note, PM 12/14/2018  INPATIENT: Hospital Day: 2  Payor: [facility] MEDICARE / Plan: [facility] MEDICARE HMO/PPO / Product Type: Managed Care Medicare /      NAME/AGE/GENDER: Anna Ponce is a 70 y.o. female   PRIMARY DIAGNOSIS:  Unilateral primary osteoarthritis, left knee [M17.12]   Procedure(s) and Anesthesia Type:     * LEFT KNEE ARTHROPLASTY TOTAL W/DEPUY - Spinal (Left)  ICD-10: Treatment Diagnosis:    · Pain in Left Knee (M25.562)  · Stiffness of Left Knee, Not elsewhere classified (M25.662)  · Difficulty in walking, Not elsewhere classified (R26.2)      ASSESSMENT:     Ms. Miguel Mcmahon presents with decreased rom and strength of left LE as well as decreased functional mobility and gait s/p left tka. She plans to go home with family support. She walk 66 ft, sat in chair and rested for 5 min, then walk another 66 ft to the gym using RW with Min A with slow and steady gait. Still tearful this afternoon. While in the gym she performs exercises with help. She remain in the chair with call light near. This section established at most recent assessment   PROBLEM LIST (Impairments causing functional limitations):  1. Decreased Strength  2.  Decreased ADL/Functional Activities  3. Decreased Transfer Abilities  4. Decreased Ambulation Ability/Technique  5. Decreased Balance  6. Increased Pain  7. Decreased Activity Tolerance  8. Decreased Flexibility/Joint Mobility  9. Decreased [place] with Home Exercise Program   INTERVENTIONS PLANNED: (Benefits and precautions of physical therapy have been discussed with the patient.)  1. Bed Mobility  2. Gait Training  3. Home Exercise Program (HEP)  4. Therapeutic Exercise/Strengthening  5. Transfer Training  6. Range of Motion: active/assisted/passive  7. Therapeutic Activities  8. Group Therapy     TREATMENT PLAN: Frequency/Duration: Follow patient BID for duration of hospital stay to address above goals. Rehabilitation Potential For Stated Goals: Fair     RECOMMENDED REHABILITATION/EQUIPMENT: (at time of discharge pending progress): Continue Skilled Therapy and Home Health: Physical Therapy. HISTORY:   History of Present Injury/Illness (Reason for Referral):  S/p left tka  Past Medical History/Comorbidities:   Ms. Ramon Quinn  has a past medical history of Asthma, High cholesterol, History of anemia, History of hypokalemia, Hypertension, Morbid obesity ([address] Neuropathy, EMY on CPAP, Osteoarthritis, and Unspecified adverse effect of anesthesia. Ms. Ramon Quinn  has a past surgical history that includes hx hernia repair; LEFT KNEE ARTHROPLASTY TOTAL W/DEPUY (Left, 12/13/2018); BILATERAL HAND CARPAL TUNNEL RELEASE (Bilateral, 9/16/2014); and RIGHT LONG FINGER TRIGGER RELEASE (Right, 9/16/2014).   Social History/Living Environment:   Home Environment: Private residence  # Steps to Enter: 6  One/Two Story Residence: One story  Living Alone: No  Support Systems: Family member(s)  Patient Expects to be Discharged to[de-identified] Private residence  Current DME Used/Available at Home: [address], straight  Prior Level of Function/Work/Activity:  Using rollator for gait   Number of Personal Factors/Comorbidities that affect the Plan of Care: 3+: HIGH COMPLEXITY   EXAMINATION:   Most Recent Physical Functioning:                 LLE AROM  L Knee Flexion: 70  L Knee Extension: 4          Bed Mobility  Supine to Sit: Minimum assistance  Sit to Supine: (left up in chair)    Transfers  Sit to Stand: Minimum assistance  Stand to Sit: Minimum assistance  Bed to Chair: Minimum assistance    Balance  Sitting: Intact  Standing: With support              Weight Bearing Status  Left Side Weight Bearing: As tolerated  Distance (ft): 132 Feet (ft)(but rest x 5 min 1/2 way down)  Ambulation - Level of Assistance: Minimal assistance  Assistive Device: Walker, rolling  Speed/Prisca: Delayed  Step Length: Left shortened;Right shortened  Stance: Right decreased  Gait Abnormalities: Antalgic;Decreased step clearance  Interventions: Safety awareness training     Braces/Orthotics:     Left Knee Cold  Type: Cryocuff      Body Structures Involved:  1. Bones  2. Joints  3. Muscles  4. Ligaments Body Functions Affected:  1. Movement Related Activities and Participation Affected:  1. Mobility   Number of elements that affect the Plan of Care: 3: MODERATE COMPLEXITY   CLINICAL PRESENTATION:   Presentation: Stable and uncomplicated: LOW COMPLEXITY   CLINICAL DECISION MAKING:   [facility] 6 Clicks   Basic Mobility Inpatient Short Form  How much difficulty does the patient currently have. .. Unable A Lot A Little None   1. Turning over in bed (including adjusting bedclothes, sheets and blankets)? [] 1   [] 2   [x] 3   [] 4   2. Sitting down on and standing up from a chair with arms ( e.g., wheelchair, bedside commode, etc.)   [] 1   [] 2   [x] 3   [] 4   3. Moving from lying on back to sitting on the side of the bed? [] 1   [x] 2   [] 3   [] 4   How much help from another person does the patient currently need. .. Total A Lot A Little None   4. Moving to and from a bed to a chair (including a wheelchair)? [] 1   [] 2   [x] 3   [] 4   5.   Need to walk in hospital room?   [] 1   [x] 2   [] 3   [] 4   6. Climbing 3-5 steps with a railing? [] 1   [x] 2   [] 3   [] 4   © 2007, Trustees of [address] [place] [address] [address] [id number], under license to [employer]. All rights reserved        Score:  Initial: 15 Most Recent: X (Date: -- )    Interpretation of Tool:  Represents activities that are increasingly more difficult (i.e. Bed mobility, Transfers, Gait). Score 24 23 22-20 19-15 14-10 9-7 6     Modifier CH CI CJ CK CL CM CN      ? Mobility - Walking and Moving Around:     - CURRENT STATUS: CK - 40%-59% impaired, limited or restricted    - GOAL STATUS: CJ - 20%-39% impaired, limited or restricted    - D/C STATUS:  ---------------To be determined---------------  Payor: [facility] [place] / [place] [facility] [place] / Product Type: Managed Care Medicare /      Medical Necessity:     · Patient is expected to demonstrate progress in strength, range of motion and balance to decrease assistance required with theraputic exercises and functional mobility. Reason for Services/Other Comments:  · Patient continues to require present interventions due to patient's inability to perform theraputic exercises and functional mobility independently. Use of outcome tool(s) and clinical judgement create a POC that gives a: Clear prediction of patient's progress: LOW COMPLEXITY            TREATMENT:   (In addition to Assessment/Re-Assessment sessions the following treatments were rendered)     Pre-treatment Symptoms/Complaints:  Knee pain  Pain: Initial:   Pain Intensity 1: 5(about the same)  Post Session:      Gait Training (25 Minutes):  Gait training to improve and/or restore physical functioning as related to mobility. Ambulated 132 Feet (ft)(but rest x 5 min 1/2 way down) with Minimal assistance using a Walker, rolling and minimal Safety awareness training related to their knee position and motion to promote proper body alignment.  Therapeutic Exercise: (45 Minutes(group therapy)):  Exercises per grid below to improve strength. Required minimal verbal cues to promote proper body alignment. Progressed range as indicated. Date:  12/14   Date:   Date:     ACTIVITY/EXERCISE AM PM AM PM AM PM   GROUP THERAPY  []  x  []  []  []  []   Ankle Pumps 15 15       Quad Sets 15 15       Gluteal Sets 15 15       Hip ABd/ADduction 15 15       Straight Leg Raises 15 aa 15 aa       Knee Slides 15 aa 15 aa       Short Arc Quads 15 aa 15 aa       Long Arc Quads         Chair Slides  15                B = bilateral; AA = active assistive; A = active; P = passive      Treatment/Session Assessment:     Response to Treatment:  Pt. Did fair with group therapy    Education:  [x] Home Exercises  [x] Fall Precautions  [] Hip Precautions [] D/C Instruction Review  [x] Knee/Hip Prosthesis Review  [x] Walker Management/Safety [] Adaptive Equipment as Needed       Interdisciplinary Collaboration:   o Registered Nurse    After treatment position/precautions:   o Up in chair  o Bed/Chair-wheels locked  o Bed in low position  o Caregiver at bedside  o Call light within reach  o Family at bedside  o Nurse at bedside    Compliance with Program/Exercises: Will assess as treatment progresses. No questions. Recommendations/Intent for next treatment session:  Treatment next visit will focus on increasing Ms. Yeager's independence with bed mobility, transfers, gait training, strength/ROM exercises, modalities for pain, and patient education.       Total Treatment Duration:  PT Patient Time In/Time Out  Time In: 1300  Time Out: Valerio Delvalle PTA

## 2018-12-14 NOTE — PROGRESS NOTES
Problem: Falls - Risk of  Goal: *Absence of Falls  Document Brianna Fall Risk and appropriate interventions in the flowsheet.   Outcome: Progressing Towards Goal  Fall Risk Interventions:  Mobility Interventions: Communicate number of staff needed for ambulation/transfer    Mentation Interventions: Evaluate medications/consider consulting pharmacy    Medication Interventions: Patient to call before getting OOB    Elimination Interventions: Call light in reach    History of Falls Interventions: Evaluate medications/consider consulting pharmacy

## 2018-12-14 NOTE — PROGRESS NOTES
12/13/18 2100   Oxygen Therapy   O2 Sat (%) 97 %   Pulse via Oximetry 93 beats per minute   O2 Device Room air   pt. Remains on RA. With no signs of distress at this time.

## 2018-12-15 VITALS
RESPIRATION RATE: 18 BRPM | SYSTOLIC BLOOD PRESSURE: 109 MMHG | WEIGHT: 285 LBS | OXYGEN SATURATION: 99 % | TEMPERATURE: 97.7 F | HEIGHT: 67 IN | DIASTOLIC BLOOD PRESSURE: 77 MMHG | BODY MASS INDEX: 44.73 KG/M2 | HEART RATE: 89 BPM

## 2018-12-15 LAB
BACTERIA SPEC CULT: NORMAL
SERVICE CMNT-IMP: NORMAL

## 2018-12-15 PROCEDURE — 74011250637 HC RX REV CODE- 250/637: Performed by: ORTHOPAEDIC SURGERY

## 2018-12-15 PROCEDURE — 97116 GAIT TRAINING THERAPY: CPT

## 2018-12-15 PROCEDURE — 97150 GROUP THERAPEUTIC PROCEDURES: CPT

## 2018-12-15 RX ADMIN — HYDROMORPHONE HYDROCHLORIDE 4 MG: 2 TABLET ORAL at 08:07

## 2018-12-15 RX ADMIN — ACETAMINOPHEN 1000 MG: 500 TABLET, FILM COATED ORAL at 12:07

## 2018-12-15 RX ADMIN — ASPIRIN 81 MG: 81 TABLET, COATED ORAL at 08:07

## 2018-12-15 RX ADMIN — SENNOSIDES AND DOCUSATE SODIUM 2 TABLET: 8.6; 5 TABLET ORAL at 08:07

## 2018-12-15 RX ADMIN — CELECOXIB 200 MG: 200 CAPSULE ORAL at 08:08

## 2018-12-15 RX ADMIN — ONDANSETRON 8 MG: 8 TABLET, ORALLY DISINTEGRATING ORAL at 12:16

## 2018-12-15 RX ADMIN — HYDROMORPHONE HYDROCHLORIDE 2 MG: 2 TABLET ORAL at 12:08

## 2018-12-15 RX ADMIN — Medication 1 AMPULE: at 08:07

## 2018-12-15 RX ADMIN — TRIAMTERENE AND HYDROCHLOROTHIAZIDE 1 TABLET: 37.5; 25 TABLET ORAL at 08:07

## 2018-12-15 RX ADMIN — ACETAMINOPHEN 1000 MG: 500 TABLET, FILM COATED ORAL at 00:34

## 2018-12-15 RX ADMIN — ACETAMINOPHEN 1000 MG: 500 TABLET, FILM COATED ORAL at 05:44

## 2018-12-15 NOTE — PROGRESS NOTES
Problem: Mobility Impaired (Adult and Pediatric)  Goal: *Acute Goals and Plan of Care (Insert Text)  GOALS (1-4 days):  (1.)Ms. Estelita Chong will move from supine to sit and sit to supine  in bed with STAND BY ASSIST.  (2.)Ms. Estelita Chong will transfer from bed to chair and chair to bed with STAND BY ASSIST using the least restrictive device. (3.)Ms. Estelita Chong will ambulate with STAND BY ASSIST for 100 feet with the least restrictive device. (4.)Ms. Estelita Chong will ambulate up/down 6 steps with left railing with MINIMAL ASSIST with device as needed. (5.)Ms. Estelita Chong will increase left knee ROM to 5°-80°.  ________________________________________________________________________________________________    PHYSICAL THERAPY Joint camp tKa: Daily Note, AM 12/15/2018  INPATIENT: Hospital Day: 3  Payor: [facility] MEDIC[facility] / Plan: [place] [facility] [place] / Product Type: Managed Care Medicare /      NAME/AGE/GENDER: Hilario Dalton is a 70 y.o. female   PRIMARY DIAGNOSIS:  Unilateral primary osteoarthritis, left knee [M17.12]   Procedure(s) and Anesthesia Type:     * LEFT KNEE ARTHROPLASTY TOTAL W/DEPUY - Spinal (Left)  ICD-10: Treatment Diagnosis:    · Pain in Left Knee (M25.562)  · Stiffness of Left Knee, Not elsewhere classified (M25.662)  · Difficulty in walking, Not elsewhere classified (R26.2)      ASSESSMENT:     Ms. Estelita Chong presents up in chair on contact. Worked on gait training in the graham with verbal cues. Pt doing better with gait today and was able to walk distance without a sitting break like yesterday. In gym worked on TKA exercises with verbal cues progressing with repetitions. Practiced stair training with verbal cues and pt did very well. Pt rolled back to her room, ice in place and needs in reach. She plans to go home with HHPT. HEP was reviewed along with frequency. This section established at most recent assessment   PROBLEM LIST (Impairments causing functional limitations):  1.  Decreased Strength  2. Decreased ADL/Functional Activities  3. Decreased Transfer Abilities  4. Decreased Ambulation Ability/Technique  5. Decreased Balance  6. Increased Pain  7. Decreased Activity Tolerance  8. Decreased Flexibility/Joint Mobility  9. Decreased [place] with Home Exercise Program   INTERVENTIONS PLANNED: (Benefits and precautions of physical therapy have been discussed with the patient.)  1. Bed Mobility  2. Gait Training  3. Home Exercise Program (HEP)  4. Therapeutic Exercise/Strengthening  5. Transfer Training  6. Range of Motion: active/assisted/passive  7. Therapeutic Activities  8. Group Therapy     TREATMENT PLAN: Frequency/Duration: Follow patient BID for duration of hospital stay to address above goals. Rehabilitation Potential For Stated Goals: Fair     RECOMMENDED REHABILITATION/EQUIPMENT: (at time of discharge pending progress): Continue Skilled Therapy and Home Health: Physical Therapy. HISTORY:   History of Present Injury/Illness (Reason for Referral):  S/p left tka  Past Medical History/Comorbidities:   Ms. Justin Chandler  has a past medical history of Asthma, High cholesterol, History of anemia, History of hypokalemia, Hypertension, Morbid obesity ([address] Neuropathy, EMY on CPAP, Osteoarthritis, and Unspecified adverse effect of anesthesia. Ms. Justin Chandler  has a past surgical history that includes hx hernia repair; LEFT KNEE ARTHROPLASTY TOTAL W/DEPUY (Left, 12/13/2018); BILATERAL HAND CARPAL TUNNEL RELEASE (Bilateral, 9/16/2014); and RIGHT LONG FINGER TRIGGER RELEASE (Right, 9/16/2014).   Social History/Living Environment:   Home Environment: Private residence  # Steps to Enter: 6  One/Two Story Residence: One story  Living Alone: No  Support Systems: Family member(s)  Patient Expects to be Discharged to[de-identified] Private residence  Current DME Used/Available at Home: [address] straight  Prior Level of Function/Work/Activity:  Using rollator for gait   Number of Personal Factors/Comorbidities that affect the Plan of Care: 3+: HIGH COMPLEXITY   EXAMINATION:   Most Recent Physical Functioning:                 LLE AROM  L Knee Flexion: 75  L Knee Extension: 4               Transfers  Sit to Stand: Stand-by assistance; Additional time  Stand to Sit: Stand-by assistance; Additional time    Balance  Sitting: Intact  Standing: Intact; With support         Gait Training: Yes    Weight Bearing Status  Left Side Weight Bearing: As tolerated  Distance (ft): 132 Feet (ft)(and another 15 feet)  Ambulation - Level of Assistance: Stand-by assistance  Assistive Device: Walker, rolling  Speed/Prisca: Pace decreased (<100 feet/min)  Step Length: Left shortened;Right shortened  Stance: Right decreased  Gait Abnormalities: Antalgic;Decreased step clearance  Number of Stairs Trained: 3  Stairs - Level of Assistance: Contact guard assistance  Rail Use: Left (left going up, both rails going down)  Interventions: Safety awareness training;Verbal cues     Braces/Orthotics:     Left Knee Cold  Type: Cryocuff      Body Structures Involved:  1. Bones  2. Joints  3. Muscles  4. Ligaments Body Functions Affected:  1. Movement Related Activities and Participation Affected:  1. Mobility   Number of elements that affect the Plan of Care: 3: MODERATE COMPLEXITY   CLINICAL PRESENTATION:   Presentation: Stable and uncomplicated: LOW COMPLEXITY   CLINICAL DECISION MAKING:   [employer] [facility] [facility] 6 Clicks   Basic Mobility Inpatient Short Form  How much difficulty does the patient currently have. .. Unable A Lot A Little None   1. Turning over in bed (including adjusting bedclothes, sheets and blankets)? [] 1   [] 2   [x] 3   [] 4   2. Sitting down on and standing up from a chair with arms ( e.g., wheelchair, bedside commode, etc.)   [] 1   [] 2   [x] 3   [] 4   3. Moving from lying on back to sitting on the side of the bed?    [] 1   [x] 2   [] 3   [] 4   How much help from another person does the patient currently need. .. Total A Lot A Little None   4. Moving to and from a bed to a chair (including a wheelchair)? [] 1   [] 2   [x] 3   [] 4   5. Need to walk in hospital room? [] 1   [x] 2   [] 3   [] 4   6. Climbing 3-5 steps with a railing? [] 1   [x] 2   [] 3   [] 4   © 2007, Trustees of [facility] [employer], under license to [employer]. All rights reserved        Score:  Initial: 15 Most Recent: X (Date: -- )    Interpretation of Tool:  Represents activities that are increasingly more difficult (i.e. Bed mobility, Transfers, Gait). Score 24 23 22-20 19-15 14-10 9-7 6     Modifier CH CI CJ CK CL CM CN      ? Mobility - Walking and Moving Around:     - CURRENT STATUS: CK - 40%-59% impaired, limited or restricted    - GOAL STATUS: CJ - 20%-39% impaired, limited or restricted    - D/C STATUS:  ---------------To be determined---------------  [facility] / Plan[place] [employer] [facility] [place] / Product Type: Managed Care Medicare /      Medical Necessity:     · Patient is expected to demonstrate progress in strength, range of motion and balance to decrease assistance required with theraputic exercises and functional mobility. Reason for Services/Other Comments:  · Patient continues to require present interventions due to patient's inability to perform theraputic exercises and functional mobility independently. Use of outcome tool(s) and clinical judgement create a POC that gives a: Clear prediction of patient's progress: LOW COMPLEXITY            TREATMENT:   (In addition to Assessment/Re-Assessment sessions the following treatments were rendered)     Pre-treatment Symptoms/Complaints:  Knee pain  Pain Initial: reporting pain, RN addressing     Post Session: sore after therapy     Gait Training (15 Minutes):  Gait training to improve and/or restore physical functioning as related to mobility.   Ambulated 132 Feet (ft)(and another 15 feet) with Stand-by assistance using a Walker, rolling and minimal Safety awareness training;Verbal cues related to their knee position and motion to promote proper body alignment. Therapeutic Exercise: (45 Minutes(group)):  Exercises per grid below to improve strength. Required minimal verbal cues to promote proper body alignment. Progressed range as indicated. Date:  12/14   Date:  12/15/18 Date:     ACTIVITY/EXERCISE AM PM AM PM AM PM   GROUP THERAPY  []  x  [x]  []  []  []   Ankle Pumps 15 15 20      Quad Sets 15 15 20      Gluteal Sets 15 15 20      Hip ABd/ADduction 15 15 20      Straight Leg Raises 15 aa 15 aa 20      Knee Slides 15 aa 15 aa 20      Short Arc Quads 15 aa 15 aa 20      Long Arc Quads         Chair Slides  15 20               B = bilateral; AA = active assistive; A = active; P = passive      Treatment/Session Assessment:     Response to Treatment:  Pt. Tolerated well, progressing from yesterday    Education:  [x] Home Exercises  [x] Fall Precautions   [] D/C Instruction Review  [x] Knee Prosthesis Review  [x] Walker Management/Safety [] Adaptive Equipment as Needed       Interdisciplinary Collaboration:   o Registered Nurse  o Rehabilitation Attendant    After treatment position/precautions:   o Up in chair  o Bed/Chair-wheels locked  o Call light within reach    Compliance with Program/Exercises: Compliant most of the time. Recommendations/Intent for next treatment session:  Treatment next visit will focus on increasing Ms. Yeager's independence with bed mobility, transfers, gait training, strength/ROM exercises, modalities for pain, and patient education.       Total Treatment Duration:  PT Patient Time In/Time Out  Time In: 1030  Time Out: [address], PT

## 2018-12-15 NOTE — PROGRESS NOTES
December 15, 2018         Post Op day: 2 Days Post-Op     Admit Date: 2018  Admit Diagnosis: Unilateral primary osteoarthritis, left knee [M17.12]        Subjective: Patient is status-post Procedure(s) (LRB):  LEFT KNEE ARTHROPLASTY TOTAL W/DEPUY (Left). Patient doing well. No concerns/complaints. No shortness of breath, chest pain or nausea/vomiting. Objective:   Visit Vitals  /76 (BP 1 Location: Right arm, BP Patient Position: At rest)   Pulse 89   Temp 98 °F (36.7 °C)   Resp 16   Ht 5' 7\" (1.702 m)   Wt 129.3 kg (284 lb 16 oz)   SpO2 98%   Breastfeeding? No   BMI 44.64 kg/m²    Temp (24hrs), Av °F (36.7 °C), Min:97.2 °F (36.2 °C), Max:98.5 °F (36.9 °C)    Lab Results   Component Value Date/Time    HGB 11.4 (L) 2018 07:40 PM     Extremity Exam  Dressing Clean, dry, intact.   Neuro intact and unchanged left lower extremity  Sensation intact to light touch  Extremity perfused  No sign of DVT     Assessment / Plan :  S/p Procedure(s) (LRB):  LEFT KNEE ARTHROPLASTY TOTAL W/DEPUY (Left)  Continue current postoperative plan  PT/OT-Continue current weightbearing status and restrictions of involved extremities  Continue current VTE prophylaxis  Patient Active Problem List   Diagnosis Code    CKD (chronic kidney disease) stage 3, GFR 30-59 ml/min ([facility]) N18.3    Arthritis of knee, left M17.12    S/P TKR (total knee replacement) using cement, left U85.305          Signed By: Bennie Maddox MD

## 2018-12-16 ENCOUNTER — HOME CARE VISIT (OUTPATIENT)
Dept: SCHEDULING | Facility: HOME HEALTH | Age: 63
End: 2018-12-16
Payer: MEDICARE

## 2018-12-16 VITALS
TEMPERATURE: 99.9 F | RESPIRATION RATE: 18 BRPM | SYSTOLIC BLOOD PRESSURE: 140 MMHG | DIASTOLIC BLOOD PRESSURE: 72 MMHG | HEART RATE: 95 BPM | OXYGEN SATURATION: 95 %

## 2018-12-16 PROCEDURE — 3331090001 HH PPS REVENUE CREDIT

## 2018-12-16 PROCEDURE — 3331090002 HH PPS REVENUE DEBIT

## 2018-12-16 PROCEDURE — G0151 HHCP-SERV OF PT,EA 15 MIN: HCPCS

## 2018-12-16 PROCEDURE — 400013 HH SOC

## 2018-12-17 PROCEDURE — 3331090001 HH PPS REVENUE CREDIT

## 2018-12-17 PROCEDURE — 3331090002 HH PPS REVENUE DEBIT

## 2018-12-18 ENCOUNTER — HOME CARE VISIT (OUTPATIENT)
Dept: SCHEDULING | Facility: HOME HEALTH | Age: 63
End: 2018-12-18
Payer: MEDICARE

## 2018-12-18 VITALS
SYSTOLIC BLOOD PRESSURE: 130 MMHG | RESPIRATION RATE: 16 BRPM | HEART RATE: 86 BPM | DIASTOLIC BLOOD PRESSURE: 82 MMHG | TEMPERATURE: 98.1 F

## 2018-12-18 PROCEDURE — G0151 HHCP-SERV OF PT,EA 15 MIN: HCPCS

## 2018-12-18 PROCEDURE — 3331090001 HH PPS REVENUE CREDIT

## 2018-12-18 PROCEDURE — 3331090002 HH PPS REVENUE DEBIT

## 2018-12-19 PROCEDURE — 3331090002 HH PPS REVENUE DEBIT

## 2018-12-19 PROCEDURE — 3331090001 HH PPS REVENUE CREDIT

## 2018-12-20 PROCEDURE — 3331090001 HH PPS REVENUE CREDIT

## 2018-12-20 PROCEDURE — 3331090002 HH PPS REVENUE DEBIT

## 2018-12-21 ENCOUNTER — HOME CARE VISIT (OUTPATIENT)
Dept: SCHEDULING | Facility: HOME HEALTH | Age: 63
End: 2018-12-21
Payer: MEDICARE

## 2018-12-21 VITALS
HEART RATE: 86 BPM | RESPIRATION RATE: 18 BRPM | TEMPERATURE: 99.2 F | DIASTOLIC BLOOD PRESSURE: 68 MMHG | SYSTOLIC BLOOD PRESSURE: 142 MMHG

## 2018-12-21 PROCEDURE — G0151 HHCP-SERV OF PT,EA 15 MIN: HCPCS

## 2018-12-21 PROCEDURE — 3331090002 HH PPS REVENUE DEBIT

## 2018-12-21 PROCEDURE — 3331090001 HH PPS REVENUE CREDIT

## 2018-12-22 PROCEDURE — 3331090002 HH PPS REVENUE DEBIT

## 2018-12-22 PROCEDURE — 3331090001 HH PPS REVENUE CREDIT

## 2018-12-23 PROCEDURE — 3331090002 HH PPS REVENUE DEBIT

## 2018-12-23 PROCEDURE — 3331090001 HH PPS REVENUE CREDIT

## 2018-12-24 PROCEDURE — 3331090001 HH PPS REVENUE CREDIT

## 2018-12-24 PROCEDURE — 3331090002 HH PPS REVENUE DEBIT

## 2018-12-25 PROCEDURE — 3331090002 HH PPS REVENUE DEBIT

## 2018-12-25 PROCEDURE — 3331090001 HH PPS REVENUE CREDIT

## 2018-12-26 ENCOUNTER — HOME CARE VISIT (OUTPATIENT)
Dept: SCHEDULING | Facility: HOME HEALTH | Age: 63
End: 2018-12-26
Payer: MEDICARE

## 2018-12-26 VITALS
SYSTOLIC BLOOD PRESSURE: 130 MMHG | RESPIRATION RATE: 17 BRPM | TEMPERATURE: 98.1 F | DIASTOLIC BLOOD PRESSURE: 90 MMHG | HEART RATE: 89 BPM

## 2018-12-26 PROCEDURE — 3331090001 HH PPS REVENUE CREDIT

## 2018-12-26 PROCEDURE — 3331090002 HH PPS REVENUE DEBIT

## 2018-12-26 PROCEDURE — G0151 HHCP-SERV OF PT,EA 15 MIN: HCPCS

## 2018-12-27 PROCEDURE — 3331090002 HH PPS REVENUE DEBIT

## 2018-12-27 PROCEDURE — 3331090001 HH PPS REVENUE CREDIT

## 2018-12-28 ENCOUNTER — HOME CARE VISIT (OUTPATIENT)
Dept: SCHEDULING | Facility: HOME HEALTH | Age: 63
End: 2018-12-28
Payer: MEDICARE

## 2018-12-28 PROCEDURE — G0151 HHCP-SERV OF PT,EA 15 MIN: HCPCS

## 2018-12-28 PROCEDURE — 3331090001 HH PPS REVENUE CREDIT

## 2018-12-28 PROCEDURE — 3331090002 HH PPS REVENUE DEBIT

## 2018-12-29 VITALS
HEART RATE: 90 BPM | SYSTOLIC BLOOD PRESSURE: 120 MMHG | DIASTOLIC BLOOD PRESSURE: 78 MMHG | TEMPERATURE: 98.4 F | RESPIRATION RATE: 17 BRPM

## 2018-12-29 PROCEDURE — 3331090002 HH PPS REVENUE DEBIT

## 2018-12-29 PROCEDURE — 3331090001 HH PPS REVENUE CREDIT

## 2018-12-30 PROCEDURE — 3331090001 HH PPS REVENUE CREDIT

## 2018-12-30 PROCEDURE — 3331090002 HH PPS REVENUE DEBIT

## 2018-12-31 PROCEDURE — 3331090002 HH PPS REVENUE DEBIT

## 2018-12-31 PROCEDURE — 3331090001 HH PPS REVENUE CREDIT

## 2019-01-01 PROCEDURE — 3331090001 HH PPS REVENUE CREDIT

## 2019-01-01 PROCEDURE — 3331090002 HH PPS REVENUE DEBIT

## 2019-01-02 ENCOUNTER — HOME CARE VISIT (OUTPATIENT)
Dept: SCHEDULING | Facility: HOME HEALTH | Age: 64
End: 2019-01-02
Payer: MEDICARE

## 2019-01-02 VITALS
RESPIRATION RATE: 16 BRPM | DIASTOLIC BLOOD PRESSURE: 62 MMHG | HEART RATE: 66 BPM | SYSTOLIC BLOOD PRESSURE: 122 MMHG | TEMPERATURE: 98 F

## 2019-01-02 PROCEDURE — G0151 HHCP-SERV OF PT,EA 15 MIN: HCPCS

## 2019-01-02 PROCEDURE — A4649 SURGICAL SUPPLIES: HCPCS

## 2019-01-02 PROCEDURE — 3331090002 HH PPS REVENUE DEBIT

## 2019-01-02 PROCEDURE — 3331090001 HH PPS REVENUE CREDIT

## 2019-01-03 PROCEDURE — 3331090002 HH PPS REVENUE DEBIT

## 2019-01-03 PROCEDURE — 3331090001 HH PPS REVENUE CREDIT

## 2019-01-04 ENCOUNTER — HOME CARE VISIT (OUTPATIENT)
Dept: HOME HEALTH SERVICES | Facility: HOME HEALTH | Age: 64
End: 2019-01-04
Payer: MEDICARE

## 2019-01-04 PROCEDURE — 3331090001 HH PPS REVENUE CREDIT

## 2019-01-04 PROCEDURE — 3331090002 HH PPS REVENUE DEBIT

## 2019-01-05 PROCEDURE — 3331090001 HH PPS REVENUE CREDIT

## 2019-01-05 PROCEDURE — 3331090002 HH PPS REVENUE DEBIT

## 2019-01-06 PROCEDURE — 3331090001 HH PPS REVENUE CREDIT

## 2019-01-06 PROCEDURE — 3331090002 HH PPS REVENUE DEBIT

## 2019-01-07 ENCOUNTER — HOME CARE VISIT (OUTPATIENT)
Dept: SCHEDULING | Facility: HOME HEALTH | Age: 64
End: 2019-01-07
Payer: MEDICARE

## 2019-01-07 VITALS
TEMPERATURE: 98.8 F | HEART RATE: 78 BPM | SYSTOLIC BLOOD PRESSURE: 124 MMHG | RESPIRATION RATE: 17 BRPM | DIASTOLIC BLOOD PRESSURE: 76 MMHG

## 2019-01-07 PROCEDURE — G0151 HHCP-SERV OF PT,EA 15 MIN: HCPCS

## 2019-01-07 PROCEDURE — 3331090002 HH PPS REVENUE DEBIT

## 2019-01-07 PROCEDURE — 3331090001 HH PPS REVENUE CREDIT

## 2019-01-08 PROCEDURE — 3331090001 HH PPS REVENUE CREDIT

## 2019-01-08 PROCEDURE — 3331090002 HH PPS REVENUE DEBIT

## 2019-01-09 ENCOUNTER — HOME CARE VISIT (OUTPATIENT)
Dept: SCHEDULING | Facility: HOME HEALTH | Age: 64
End: 2019-01-09
Payer: MEDICARE

## 2019-01-09 PROCEDURE — 3331090001 HH PPS REVENUE CREDIT

## 2019-01-09 PROCEDURE — 3331090002 HH PPS REVENUE DEBIT

## 2019-01-09 PROCEDURE — G0151 HHCP-SERV OF PT,EA 15 MIN: HCPCS

## 2019-01-09 PROCEDURE — 3331090003 HH PPS REVENUE ADJ

## 2019-01-10 VITALS
SYSTOLIC BLOOD PRESSURE: 124 MMHG | TEMPERATURE: 97.7 F | RESPIRATION RATE: 17 BRPM | DIASTOLIC BLOOD PRESSURE: 76 MMHG | HEART RATE: 76 BPM

## 2019-01-17 ENCOUNTER — HOSPITAL ENCOUNTER (OUTPATIENT)
Dept: PHYSICAL THERAPY | Age: 64
End: 2019-01-17
Payer: MEDICARE

## 2019-01-22 ENCOUNTER — HOSPITAL ENCOUNTER (OUTPATIENT)
Dept: PHYSICAL THERAPY | Age: 64
Discharge: HOME | End: 2019-01-22
Payer: MEDICARE

## 2019-01-22 DIAGNOSIS — Z96.652 S/P TKR (TOTAL KNEE REPLACEMENT) USING CEMENT, LEFT: ICD-10-CM

## 2019-01-22 PROCEDURE — 97162 PT EVAL MOD COMPLEX 30 MIN: CPT

## 2019-01-22 PROCEDURE — G8979 MOBILITY GOAL STATUS: HCPCS

## 2019-01-22 PROCEDURE — G8978 MOBILITY CURRENT STATUS: HCPCS

## 2019-01-22 NOTE — PROGRESS NOTES
Sherin Everett : 1955 Payor: [facility] MEDICARE / Plan: Cedeno Mylesjesus alberto [facility] MEDICARE HMO/PPO / Product Type: Managed Care Medicare /  
 [address] at [facility] [address] [facility] [address] [facility], [facility], [address] [place] [address] [phone]   [phone] OUTPATIENT PHYSICAL THERAPY:Initial Assessment 2019 ICD-10: Treatment Diagnosis: Pain in Left Knee (M25.562) Stiffness of Left Knee, Not elsewhere classified (M25.662) Difficulty in walking, Not elsewhere classified (R26.2) Other abnormalities of gait and mobility (R26.89) 
h  Effusion of left knee Precautions/Allergies:  
Hydrocodone MD Orders: evaluate and treat MEDICAL/REFERRING DIAGNOSIS: 
Presence of left artificial knee joint [Z96.652] DATE OF ONSET: 19 REFERRING PHYSICIAN: Diego Victoria MD 
RETURN PHYSICIAN APPOINTMENT: not specified INITIAL ASSESSMENT:  Ms. Misti Clark presents with functional limitations due to recent L TKA. Physical therapy evaluation reveals decreased ROM, strength, and difficulty with gait and transfers. Pt currently reporting constant pain and unable to sleep due to poor pain control. Pt will highly benefit from skilled PT to address problems below and reach maximum functional mobility. PROBLEM LIST (Impacting functional limitations): 1. Decreased Strength 2. Decreased ADL/Functional Activities 3. Decreased Transfer Abilities 4. Decreased Ambulation Ability/Technique 5. Decreased Balance 6. Increased Pain 7. Decreased Activity Tolerance 8. Decreased Flexibility/Joint Mobility 9. Edema/Girth 10. Decreased Knowledge of Precautions 11. Decreased [place] with Home Exercise Program INTERVENTIONS PLANNED: 
1. Balance Exercise 2. Bed Mobility 3. Cold 4. Cryotherapy 5. Electrical Stimulation 6. Gait Training 7. Heat 8. Home Exercise Program (HEP) 9. Manual Therapy 10. Neuromuscular Re-education/Strengthening 11. Range of Motion (ROM) 12. Therapeutic Activites 13. Therapeutic Exercise/Strengthening 14. Transfer Training 15. vaso pneumatic compression TREATMENT PLAN: 
Effective Dates: 1/22/2019 TO 4/22/2019 (90 days). Frequency/Duration: 2 times a week for 90 Days GOALS: (Goals have been discussed and agreed upon with patient.) SHORT-TERM FUNCTIONAL GOALS: Time Frame: 2-4 weeks 1. Pt will be independent with HEP. 2. Pt will report left knee pain in no longer constant. 3. Pt will report increased ease of sleep. 4. Pt will ambulate at lease 200 ft on level surface safely without need for assistive device. DISCHARGE GOALS: Time Frame: 6-8 weeks 1. Pt will improve LEFS score by at least 10 points. 2. Pt will ambulate stairs with recip gait pattern, ascending and descending no hand rail. 3. Pt will demonstrate improved functional strength by sit to stand transfers 5/5 with no use of UE. 4. Pt will demonstrate left knee flexion ROM to at least 110. 
5. Pt mid patellar girth measurement will measure within 1 cm R-L to demonstrate decreased edema. REHABILITATION POTENTIAL FOR STATED GOALS: Goo[place] Regarding [id number] therapy, I certify that the treatment plan above will be carried out by a therapist or under their direction. Thank you for this referral, 
Ananth Aceves PT Referring Physician Signature: Shanta Fenton MD            Date The information in this section was collected on 1/22/19 (except where otherwise noted). HISTORY:  
History of Present Injury/Illness (Reason for Referral): L TKA 12/13/18. Both knees painful for 3-4 years, left worse. Using cane and rolling walker over las 6-7 months. Conservative measures failed leading to surgery. 2 days in hospital 2-3 weeks of home therapy. Present symptoms (on day of initial evaluation): aching and throbbing, tight, swelling left knee, sleep disturbed · Aggravating factors: not taking medication, weather changes, walking, standing, sit to stand transfers, sit to stand · Relieving factors: ice, medication · Pain level: 4/10 present, 6/10 worst, 4/10 best  
 
Past Medical History/Comorbidities: Ms. Catalina Baires  has a past medical history of Asthma, High cholesterol, History of anemia, History of hypokalemia, Hypertension, Morbid obesity ([address] (09/11/2014), Neuropathy, EMY on CPAP, Osteoarthritis, and Unspecified adverse effect of anesthesia. Ms. Catalina Baires  has a past surgical history that includes hx hernia repair. Social History/Living Environment:  
Lives alone, but family close by, stairs to enter Prior Level of Function/Work/Activity: 
Not currently working, used cane/walker for 6+ months prior to surgery Dominant Side:  
      RIGHT Other Clinical Tests:   
      none Previous Treatment Approaches:   
      Injections, home therapy Ambulatory/Rehab Services H2 Model Falls Risk Assessment Risk Factors: 
     No Risk Factors Identified Ability to Rise from Chair: 
     (1)  Pushes up, successful in one attempt Falls Prevention Plan: No modifications necessary Total: (5 or greater = High Risk): 1 ©2010 [facility] [address] [place] [address] States Patent #[id number]. Federal Law prohibits the replication, distribution or use without written permission from [facility] [employer] Current Medications:   
  
Current Outpatient Medications:  
  promethazine (PHENERGAN) 25 mg tablet, Take 25 mg by mouth every six (6) hours as needed for Nausea., Disp: , Rfl:  
  HYDROmorphone (DILAUDID) 2 mg tablet, Take 1-2 Tabs by mouth every four (4) hours as needed. Max Daily Amount: 24 mg. (Patient taking differently: Take 1-2 Tabs by mouth every four (4) hours as needed for Pain.), Disp: 60 Tab, Rfl: 0 
  triamterene-hydroCHLOROthiazide (DYAZIDE) 37.5-25 mg per capsule, Take 1 Cap by mouth daily. , Disp: , Rfl:  
   albuterol (PROAIR HFA) 90 mcg/actuation inhaler, Take 1 Puff by inhalation every four (4) hours as needed for Wheezing., Disp: , Rfl:   
Date Last Reviewed:  1/22/2019 Number of Personal Factors/Comorbidities that affect the Plan of Care: 1-2: MODERATE COMPLEXITY EXAMINATION:  
Observation/Orthostatic Postural Assessment:   
     Standing: abduction and ER of B hip joints in static standing Sitting: slumped positioning   
     incision appears healed well, no discoloration or drainage Palpation: ·  Tender of entire left knee joint · Min to mod edema, non pitting Girth: · Mid patella; 49 cm right,  51 cm left ROM:   
       
LE ROM  DATE 
1/22/19 DATE Hip flexion limited B to 80-90 deg R:  
L:   
Hip Abduction  WNL B  R:  
L:   
Hip Extension  WFL  B R:  
L:   
Knee Flexion  R: 90 deg L: 95 deg R:  
L:   
Knee Extension  R: 0 
L: 0 R:  
L:   
Ankle  Limited B DF  R:  
L:  
 
 
Strength:   
       
Lower quadrant    DATE 
1/22/19 DATE Hip flexion R: 4/5 
L: 3+/5 R:  
L:   
Hip Abduction  R: 4/5 L: 4/5 R:  
L:   
Hip Extension  R: 4/5 L: 4/5 R:  
L:   
Knee Flexion  R: 4+/5 L: 4/5 R:  
L:   
Knee Extension  R: 4+/5 L: 4/5 R:  
L:   
Ankle Dorsiflexion  R: 4+/5 L:4+/5 R:  
L:  
Ankle Plantarflexion  R:4+/5 L:4+/5 R: 
L:  
 
Special Tests:   
      none Neurological Screen: No abnormalities Functional Mobility:  
      Gait/Ambulation: walking with straight cane,  
      Transfers: heavy use of UE for sit to stand Bed Mobility: slow, challenging Stairs:  recip with UE and cane ascending, step to with UE descending Body Structures Involved: 1. Bones 2. Joints 3. Muscles Body Functions Affected: 1. Mental 
2. Sensory/Pain 3. Neuromusculoskeletal 
4. Movement Related Activities and Participation Affected: 1. General Tasks and Demands 2. Mobility 3. Self Care 4. Domestic Life Number of elements that affect the Plan of Care: 4+: HIGH COMPLEXITY CLINICAL PRESENTATION:  
Presentation: Evolving clinical presentation with changing clinical characteristics: MODERATE COMPLEXITY CLINICAL DECISION MAKING:  
Outcome Measure: Tool Used: Lower Extremity Functional Scale (LEFS) Score:  Initial: 18/80 Most Recent: X/80 (Date: -- ) Interpretation of Score: 20 questions each scored on a 5 point scale with 0 representing \"extreme difficulty or unable to perform\" and 4 representing \"no difficulty\". The lower the score, the greater the functional disability. 80/80 represents no disability. Minimal detectable change is 9 points. Medical Necessity:  
· Patient is expected to demonstrate progress in strength, range of motion, balance, coordination and functional technique to increase independence with all ADLs and ambulation . Reason for Services/Other Comments: 
· Patient continues to require modification of therapeutic interventions to increase complexity of exercises. Use of outcome tool(s) and clinical judgement create a POC that gives a: Questionable prediction of patient's progress: MODERATE COMPLEXITY  
  
 
 
 
TREATMENT:  
(In addition to Assessment/Re-Assessment sessions the following treatments were rendered) Pre-treatment Symptoms/Complaints:  See above Pain: Initial:  
  4/10 Post Session:  4/10 Date: 
2/22/19 Date: 
 Date: Activity/Exercise Parameters Parameters Parameters Education  POC, HEP,ice application with elevation, sit to stand t/f    
nustep NV    
SLR Demo do Heel slide Demo do MedBridge Portal 
 
Treatment/Session Assessment:   
· Response to Treatment:  No treatment today--needs pre-auth . · Compliance with Program/Exercises: Will assess as treatment progresses. · Recommendations/Intent for next treatment session: \"Next visit will focus on advancements to more challenging activities\". No future appointments. Total Treatment Duration: 0   (55 min evaluation) PT Patient Time In/Time Out Time In: 1000 Time Out: 1100 Manish Arnett PT

## 2019-01-28 ENCOUNTER — HOSPITAL ENCOUNTER (OUTPATIENT)
Dept: PHYSICAL THERAPY | Age: 64
Discharge: HOME | End: 2019-01-28
Payer: MEDICARE

## 2019-01-28 PROCEDURE — 97016 VASOPNEUMATIC DEVICE THERAPY: CPT

## 2019-01-28 PROCEDURE — 97110 THERAPEUTIC EXERCISES: CPT

## 2019-01-28 NOTE — PROGRESS NOTES
Myrna Karendianna : 1955 Payor: [facility] MEDIC[facility] / Plan: Miriam Gibson [facility] MEDICARE HMO/PPO / Product Type: Managed Care Medicare /  
 [address] at [facility] [address] [place] [address], [id number] [place] [address] [phone]   [phone] OUTPATIENT PHYSICAL THERAPY:Daily Note  2019 ICD-10: Treatment Diagnosis: Pain in Left Knee (M25.562) Stiffness of Left Knee, Not elsewhere classified (M25.662) Difficulty in walking, Not elsewhere classified (R26.2) Other abnormalities of gait and mobility (R26.89) 
h  Effusion of left knee Precautions/Allergies:  
Hydrocodone MD Orders: evaluate and treat MEDICAL/REFERRING DIAGNOSIS: 
Presence of left artificial knee joint [Z96.652] DATE OF ONSET: 19 REFERRING PHYSICIAN: Araseli Walters MD 
RETURN PHYSICIAN APPOINTMENT: not specified INITIAL ASSESSMENT:  Ms. Ronal Cuevas presents with functional limitations due to recent L TKA. Physical therapy evaluation reveals decreased ROM, strength, and difficulty with gait and transfers. Pt currently reporting constant pain and unable to sleep due to poor pain control. Pt will highly benefit from skilled PT to address problems below and reach maximum functional mobility. PROBLEM LIST (Impacting functional limitations): 1. Decreased Strength 2. Decreased ADL/Functional Activities 3. Decreased Transfer Abilities 4. Decreased Ambulation Ability/Technique 5. Decreased Balance 6. Increased Pain 7. Decreased Activity Tolerance 8. Decreased Flexibility/Joint Mobility 9. Edema/Girth 10. Decreased Knowledge of Precautions 11. Decreased [place] with Home Exercise Program INTERVENTIONS PLANNED: 
1. Balance Exercise 2. Bed Mobility 3. Cold 4. Cryotherapy 5. Electrical Stimulation 6. Gait Training 7. Heat 8. Home Exercise Program (HEP) 9. Manual Therapy 10. Neuromuscular Re-education/Strengthening 11. Range of Motion (ROM) 12. Therapeutic Activites 13. Therapeutic Exercise/Strengthening 14. Transfer Training 15. vaso pneumatic compression TREATMENT PLAN: 
Effective Dates: 1/22/2019 TO 4/22/2019 (90 days). Frequency/Duration: 2 times a week for 90 Days GOALS: (Goals have been discussed and agreed upon with patient.) SHORT-TERM FUNCTIONAL GOALS: Time Frame: 2-4 weeks 1. Pt will be independent with HEP. 2. Pt will report left knee pain in no longer constant. 3. Pt will report increased ease of sleep. 4. Pt will ambulate at lease 200 ft on level surface safely without need for assistive device. DISCHARGE GOALS: Time Frame: 6-8 weeks 1. Pt will improve LEFS score by at least 10 points. 2. Pt will ambulate stairs with recip gait pattern, ascending and descending no hand rail. 3. Pt will demonstrate improved functional strength by sit to stand transfers 5/5 with no use of UE. 4. Pt will demonstrate left knee flexion ROM to at least 110. 
5. Pt mid patellar girth measurement will measure within 1 cm R-L to demonstrate decreased edema. REHABILITATION POTENTIAL FOR STATED GOALS: [place] Regarding [id number] therapy, I certify that the treatment plan above will be carried out by a therapist or under their direction. Thank you for this referral, 
Ananth Aceves PT Referring Physician Signature: Shanta Fenton MD            Date The information in this section was collected on 1/22/19 (except where otherwise noted). HISTORY:  
History of Present Injury/Illness (Reason for Referral): L TKA 12/13/18. Both knees painful for 3-4 years, left worse. Using cane and rolling walker over las 6-7 months. Conservative measures failed leading to surgery. 2 days in hospital 2-3 weeks of home therapy. Present symptoms (on day of initial evaluation): aching and throbbing, tight, swelling left knee, sleep disturbed · Aggravating factors: not taking medication, weather changes, walking, standing, sit to stand transfers, sit to stand · Relieving factors: ice, medication · Pain level: 4/10 present, 6/10 worst, 4/10 best  
 
Past Medical History/Comorbidities: Ms. Katya Elliott  has a past medical history of Asthma, High cholesterol, History of anemia, History of hypokalemia, Hypertension, Morbid obesity ([facility] [address] (09/11/2014), Neuropathy, EMY on CPAP, Osteoarthritis, and Unspecified adverse effect of anesthesia. Ms. Katya Elliott  has a past surgical history that includes hx hernia repair. Social History/Living Environment:  
Lives alone, but family close by, stairs to enter Prior Level of Function/Work/Activity: 
Not currently working, used cane/walker for 6+ months prior to surgery Dominant Side:  
      RIGHT Other Clinical Tests:   
      none Previous Treatment Approaches:   
      Injections, home therapy Ambulatory/Rehab Services H2 Model Falls Risk Assessment Risk Factors: 
     No Risk Factors Identified Ability to Rise from Chair: 
     (1)  Pushes up, successful in one attempt Falls Prevention Plan: No modifications necessary Total: (5 or greater = High Risk): 1 ©2010 [facility] States Patent #[id number]. Federal Law prohibits the replication, distribution or use without written permission from [facility] [employer] Current Medications:   
  
Current Outpatient Medications:  
  promethazine (PHENERGAN) 25 mg tablet, Take 25 mg by mouth every six (6) hours as needed for Nausea., Disp: , Rfl:  
  HYDROmorphone (DILAUDID) 2 mg tablet, Take 1-2 Tabs by mouth every four (4) hours as needed. Max Daily Amount: 24 mg. (Patient taking differently: Take 1-2 Tabs by mouth every four (4) hours as needed for Pain.), Disp: 60 Tab, Rfl: 0 
  triamterene-hydroCHLOROthiazide (DYAZIDE) 37.5-25 mg per capsule, Take 1 Cap by mouth daily. , Disp: , Rfl:  
   albuterol (PROAIR HFA) 90 mcg/actuation inhaler, Take 1 Puff by inhalation every four (4) hours as needed for Wheezing., Disp: , Rfl:   
Date Last Reviewed:  1/28/2019 EXAMINATION:  
Observation/Orthostatic Postural Assessment:   
     Standing: abduction and ER of B hip joints in static standing Sitting: slumped positioning   
     incision appears healed well, no discoloration or drainage Palpation: ·  Tender of entire left knee joint · Min to mod edema, non pitting Girth: · Mid patella; 49 cm right,  51 cm left ROM:   
       
LE ROM  DATE 
1/22/19 DATE Hip flexion limited B to 80-90 deg R:  
L:   
Hip Abduction  WNL B  R:  
L:   
Hip Extension  WFL  B R:  
L:   
Knee Flexion  R: 90 deg L: 95 deg R:  
L:   
Knee Extension  R: 0 
L: 0 R:  
L:   
Ankle  Limited B DF  R:  
L:  
 
 
Strength:   
       
Lower quadrant    DATE 
1/22/19 DATE Hip flexion R: 4/5 
L: 3+/5 R:  
L:   
Hip Abduction  R: 4/5 L: 4/5 R:  
L:   
Hip Extension  R: 4/5 L: 4/5 R:  
L:   
Knee Flexion  R: 4+/5 L: 4/5 R:  
L:   
Knee Extension  R: 4+/5 L: 4/5 R:  
L:   
Ankle Dorsiflexion  R: 4+/5 L:4+/5 R:  
L:  
Ankle Plantarflexion  R:4+/5 L:4+/5 R: 
L:  
 
Special Tests:   
      none Neurological Screen: No abnormalities Functional Mobility:  
      Gait/Ambulation: walking with straight cane,  
      Transfers: heavy use of UE for sit to stand Bed Mobility: slow, challenging Stairs:  recip with UE and cane ascending, step to with UE descending Outcome Measure: Tool Used: Lower Extremity Functional Scale (LEFS) Score:  Initial: 18/80 Most Recent: X/80 (Date: -- ) Interpretation of Score: 20 questions each scored on a 5 point scale with 0 representing \"extreme difficulty or unable to perform\" and 4 representing \"no difficulty\". The lower the score, the greater the functional disability. 80/80 represents no disability. Minimal detectable change is 9 points. Medical Necessity:  
· Patient is expected to demonstrate progress in strength, range of motion, balance, coordination and functional technique to increase independence with all ADLs and ambulation . Reason for Services/Other Comments: 
· Patient continues to require modification of therapeutic interventions to increase complexity of exercises. TREATMENT:  
(In addition to Assessment/Re-Assessment sessions the following treatments were rendered) Pre-treatment Symptoms/Complaints: pt states that she is stiff today and states that she performed her HEP like we had talked about last session but had a \"catch\" when she would do heel slide. She reports overall, the past few days where better than ever since surgery. Pain: Initial:  
  3/10 Post Session:  0/10 ROM :  
 
Therapeutic Exercise: ( 30 min):  Exercises per grid below to improve mobility, strength and balance. Required moderate visual, verbal and manual cues to promote proper body alignment, promote proper body posture, promote proper body mechanics and promote proper body breathing techniques. Progressed resistance, range and repetitions as indicated. Date: 
1/22/19 Date: 
1/28/19 Date: Activity/Exercise Parameters Parameters Parameters Education  POC, HEP,ice application with elevation, sit to stand t/f New HEP   
nustep NV 10 min level 2 SLR Demo do 2 x 5 Heel slide Demo do X 5 with op    
SAQ  X 20 2# Clamshell  2 x 5 LAQ  X 10 B Ham curl  YTB x 10 B [employer] Portal 
Modalities:  
· vasopneumatic compression x 10 min, Mod compression, 34 deg left knee to decrease edema and improve joint mobility. Treatment/Session Assessment:   
· Response to Treatment:  Pt very quiet during session today but no complaints during exercises. · Compliance with Program/Exercises: Will assess as treatment progresses. · Recommendations/Intent for next treatment session:  \"Next visit will focus on advancements to more challenging activities\". Future Appointments Date Time Provider Robert Figueroa 2/1/2019  1:00 PM Kin Lee, PT [facility] Total Treatment Duration: PT Patient Time In/Time Out Time In: 1000 Time Out: 1100 Jazmyne Campuzano PT

## 2019-02-01 ENCOUNTER — HOSPITAL ENCOUNTER (OUTPATIENT)
Dept: PHYSICAL THERAPY | Age: 64
Discharge: HOME | End: 2019-02-01
Payer: MEDICARE

## 2019-02-01 PROCEDURE — 97110 THERAPEUTIC EXERCISES: CPT

## 2019-02-01 PROCEDURE — 97140 MANUAL THERAPY 1/> REGIONS: CPT

## 2019-02-01 NOTE — PROGRESS NOTES
Miya Joel  : 1955  Payor: Genesisanthony Lee [place] / Plan: Mireya Aquiles [place] / Product Type: Managed Care Medicare /    [address] [address] at [address]. [facility], [address], [facility], [address]  [phone]   [phone]         OUTPATIENT PHYSICAL THERAPY:Daily Note  2019    ICD-10: Treatment Diagnosis: Pain in Left Knee (M25.562)  Stiffness of Left Knee, Not elsewhere classified (M25.662)  Difficulty in walking, Not elsewhere classified (R26.2)  Other abnormalities of gait and mobility (R26.89)  h  Effusion of left knee   Precautions/Allergies:   Hydrocodone   MD Orders: evaluate and treat MEDICAL/REFERRING DIAGNOSIS:  Presence of left artificial knee joint [Z96.652]   DATE OF ONSET: 19  REFERRING PHYSICIAN: Mary Alice Torres MD  RETURN PHYSICIAN APPOINTMENT: not specified     INITIAL ASSESSMENT:  Ms. Renee Corley presents with functional limitations due to recent L TKA. Physical therapy evaluation reveals decreased ROM, strength, and difficulty with gait and transfers. Pt currently reporting constant pain and unable to sleep due to poor pain control. Pt will highly benefit from skilled PT to address problems below and reach maximum functional mobility. PROBLEM LIST (Impacting functional limitations):  1. Decreased Strength  2. Decreased ADL/Functional Activities  3. Decreased Transfer Abilities  4. Decreased Ambulation Ability/Technique  5. Decreased Balance  6. Increased Pain  7. Decreased Activity Tolerance  8. Decreased Flexibility/Joint Mobility  9. Edema/Girth  10. Decreased Knowledge of Precautions  11. Decreased [place] with Home Exercise Program INTERVENTIONS PLANNED:  1. Balance Exercise  2. Bed Mobility  3. Cold  4. Cryotherapy  5. Electrical Stimulation  6. Gait Training  7. Heat  8. Home Exercise Program (HEP)  9. Manual Therapy  10. Neuromuscular Re-education/Strengthening  11.  Range of Motion (ROM)  12. Therapeutic Activites  13. Therapeutic Exercise/Strengthening  14. Transfer Training  15. vaso pneumatic compression      TREATMENT PLAN:  Effective Dates: 1/22/2019 TO 4/22/2019 (90 days). Frequency/Duration: 2 times a week for 90 Days    GOALS: (Goals have been discussed and agreed upon with patient.)    SHORT-TERM FUNCTIONAL GOALS: Time Frame: 2-4 weeks   1. Pt will be independent with HEP. 2. Pt will report left knee pain in no longer constant. 3. Pt will report increased ease of sleep. 4. Pt will ambulate at lease 200 ft on level surface safely without need for assistive device. DISCHARGE GOALS: Time Frame: 6-8 weeks   1. Pt will improve LEFS score by at least 10 points. 2. Pt will ambulate stairs with recip gait pattern, ascending and descending no hand rail. 3. Pt will demonstrate improved functional strength by sit to stand transfers 5/5 with no use of UE.   4. Pt will demonstrate left knee flexion ROM to at least 110.  5. Pt mid patellar girth measurement will measure within 1 cm R-L to demonstrate decreased edema. REHABILITATION POTENTIAL FOR STATED GOALS: GooD    Regarding [address] [employer], I certify that the treatment plan above will be carried out by a therapist or under their direction. Thank you for this referral,  Filemon Hooks PT     Referring Physician Signature: Jaclyn Bang MD              Date                  The information in this section was collected on 1/22/19 (except where otherwise noted). HISTORY:   History of Present Injury/Illness (Reason for Referral):  L TKA 12/13/18. Both knees painful for 3-4 years, left worse. Using cane and rolling walker over las 6-7 months. Conservative measures failed leading to surgery. 2 days in hospital 2-3 weeks of home therapy.    Present symptoms (on day of initial evaluation): aching and throbbing, tight, swelling left knee, sleep disturbed   · Aggravating factors: not taking medication, weather changes, walking, standing, sit to stand transfers, sit to stand  · Relieving factors: ice, medication   · Pain level: 4/10 present, 6/10 worst, 4/10 best     Past Medical History/Comorbidities:   Ms. Cady Javier  has a past medical history of Asthma, High cholesterol, History of anemia, History of hypokalemia, Hypertension, Morbid obesity ([address] (09/11/2014), Neuropathy, EMY on CPAP, Osteoarthritis, and Unspecified adverse effect of anesthesia. Ms. Cady Javier  has a past surgical history that includes hx hernia repair. Social History/Living Environment:   Lives alone, but family close by, stairs to enter   Prior Level of Function/Work/Activity:  Not currently working, used cane/walker for 6+ months prior to surgery   Dominant Side:         RIGHT  Other Clinical Tests:          none  Previous Treatment Approaches:          Injections, home therapy     Ambulatory/Rehab Services H2 Model Falls Risk Assessment    Risk Factors:       No Risk Factors Identified Ability to Rise from Chair:       (1)  Pushes up, successful in one attempt    Falls Prevention Plan:       No modifications necessary   Total: (5 or greater = High Risk): 1    ©2010 [facility] Patent #[id number]. Federal Law prohibits the replication, distribution or use without written permission from [facility] [email or web address]       Current Medications:       Current Outpatient Medications:     promethazine (PHENERGAN) 25 mg tablet, Take 25 mg by mouth every six (6) hours as needed for Nausea., Disp: , Rfl:     HYDROmorphone (DILAUDID) 2 mg tablet, Take 1-2 Tabs by mouth every four (4) hours as needed. Max Daily Amount: 24 mg. (Patient taking differently: Take 1-2 Tabs by mouth every four (4) hours as needed for Pain.), Disp: 60 Tab, Rfl: 0    triamterene-hydroCHLOROthiazide (DYAZIDE) 37.5-25 mg per capsule, Take 1 Cap by mouth daily. , Disp: , Rfl:     albuterol (PROAIR HFA) 90 mcg/actuation inhaler, Take 1 Puff by inhalation every four (4) hours as needed for Wheezing., Disp: , Rfl:    Date Last Reviewed:  2/1/2019     EXAMINATION:   Observation/Orthostatic Postural Assessment:         Standing: abduction and ER of B hip joints in static standing        Sitting: slumped positioning         incision appears healed well, no discoloration or drainage   Palpation:    ·  Tender of entire left knee joint  · Min to mod edema, non pitting     Girth:   · Mid patella; 49 cm right,  51 cm left      ROM:            LE ROM  DATE  1/22/19 DATE     Hip flexion limited B to 80-90 deg   R:   L:    Hip Abduction  WNL B  R:   L:    Hip Extension  WFL  B R:   L:    Knee Flexion  R: 90 deg  L: 95 deg R:   L:    Knee Extension  R: 0  L: 0 R:   L:    Ankle  Limited B DF  R:   L:       Strength:            Lower quadrant    DATE  1/22/19   DATE     Hip flexion R: 4/5  L: 3+/5 R:   L:    Hip Abduction  R: 4/5  L: 4/5 R:   L:    Hip Extension  R: 4/5  L: 4/5 R:   L:    Knee Flexion  R: 4+/5  L: 4/5 R:   L:    Knee Extension  R: 4+/5  L: 4/5 R:   L:    Ankle Dorsiflexion  R: 4+/5  L:4+/5 R:   L:   Ankle Plantarflexion  R:4+/5  L:4+/5 R:  L:     Special Tests:          none  Neurological Screen:        No abnormalities   Functional Mobility:         Gait/Ambulation: walking with straight cane,         Transfers: heavy use of UE for sit to stand         Bed Mobility: slow, challenging         Stairs:  recip with UE and cane ascending, step to with UE descending    Outcome Measure: Tool Used: Lower Extremity Functional Scale (LEFS)  Score:  Initial: 18/80 Most Recent: X/80 (Date: -- )   Interpretation of Score: 20 questions each scored on a 5 point scale with 0 representing \"extreme difficulty or unable to perform\" and 4 representing \"no difficulty\". The lower the score, the greater the functional disability. 80/80 represents no disability. Minimal detectable change is 9 points.     Medical Necessity:   · Patient is expected to demonstrate progress in strength, range of motion, balance, coordination and functional technique to increase independence with all ADLs and ambulation . Reason for Services/Other Comments:  · Patient continues to require modification of therapeutic interventions to increase complexity of exercises. TREATMENT:   (In addition to Assessment/Re-Assessment sessions the following treatments were rendered)  Pre-treatment Symptoms/Complaints: pt states that she was a little sore after last session but making slow improvements daily with sleep and mobility. Pain: Initial:     2/10 Post Session:  2/10     ROM: 110 flexion     Therapeutic Exercise: ( 30 min):  Exercises per grid below to improve mobility, strength and balance. Required moderate visual, verbal and manual cues to promote proper body alignment, promote proper body posture, promote proper body mechanics and promote proper body breathing techniques. Progressed resistance, range and repetitions as indicated. Date:  1/22/19 Date:  1/28/19 Date:  2/1/19   Activity/Exercise Parameters Parameters Parameters   Education  POC, HEP,ice application with elevation, sit to stand t/f New HEP    nustep NV 10 min level 2     SLR Demo do 2 x 5  X 10    Heel slide Demo do X 5 with op  X 5    SAQ  X 20 2# 2 x 10 3#   Clamshell  2 x 5    LAQ  X 10 B    Ham curl  YTB x 10 B    Sit to stand    X 10, no UE   Gait    2 x 35 '   Step tap   X 10 B with use of UE    Side stepping   2 x 35 '   Knee flexion and ham stretch combo    Supine hip @ 90 with flex/ext knee             Manual techniques: 10 min  To reduce joint restriction due to scar adhesions. · Scar mobilization all directions anterior left knee    MedBridge Portal  Modalities:   · vasopneumatic compression x 10 min, Mod compression, 34 deg left knee to decrease edema and improve joint mobility. (NOT TODAY)      Treatment/Session Assessment:    · Response to Treatment:  Pt more talkative today and participated in all activities with fair effort. Pt reported dizziness with side stepping so provided HHA throughout activity as well as moderate verbal and tactile cuing to correct hip/pelvic position. Ended session 5 min early due to pt request as she had to  her grand child off bus continue to progress pt in gait and stability to eventually wean from cane. · Compliance with Program/Exercises: Will assess as treatment progresses. · Recommendations/Intent for next treatment session: \"Next visit will focus on advancements to more challenging activities\".     Future Appointments   Date Time Provider Robert Figueroa   2/6/2019 11:00 AM Katarina Lee, PT [facility]       Total Treatment Duration:   PT Patient Time In/Time Out  Time In: 1300  Time Out: 909 [facility], PT

## 2019-02-06 ENCOUNTER — HOSPITAL ENCOUNTER (OUTPATIENT)
Dept: PHYSICAL THERAPY | Age: 64
Discharge: HOME | End: 2019-02-06
Payer: MEDICARE

## 2019-02-06 PROCEDURE — 97016 VASOPNEUMATIC DEVICE THERAPY: CPT

## 2019-02-06 PROCEDURE — 97110 THERAPEUTIC EXERCISES: CPT

## 2019-02-06 NOTE — PROGRESS NOTES
Will Jennings  : 1955  Payor[place] Aliya Durán [place] / Plan[place] Fabian Juarez [place] / Product Type: Managed Care Medicare /    Paola Fess at [address] [place] [address]. [facility], [address], [facility], [address] [facility]  [phone]   [phone]         OUTPATIENT PHYSICAL THERAPY:Daily Note  2019    ICD-10: Treatment Diagnosis: Pain in Left Knee (M25.562)  Stiffness of Left Knee, Not elsewhere classified (M25.662)  Difficulty in walking, Not elsewhere classified (R26.2)  Other abnormalities of gait and mobility (R26.89)  h  Effusion of left knee   Precautions/Allergies:   Hydrocodone   MD Orders: evaluate and treat MEDICAL/REFERRING DIAGNOSIS:  Presence of left artificial knee joint [Z96.652]   DATE OF ONSET: 19  REFERRING PHYSICIAN: Radha Mcdonald MD  RETURN PHYSICIAN APPOINTMENT: not specified     INITIAL ASSESSMENT:  Ms. Marce Waddell presents with functional limitations due to recent L TKA. Physical therapy evaluation reveals decreased ROM, strength, and difficulty with gait and transfers. Pt currently reporting constant pain and unable to sleep due to poor pain control. Pt will highly benefit from skilled PT to address problems below and reach maximum functional mobility. PROBLEM LIST (Impacting functional limitations):  1. Decreased Strength  2. Decreased ADL/Functional Activities  3. Decreased Transfer Abilities  4. Decreased Ambulation Ability/Technique  5. Decreased Balance  6. Increased Pain  7. Decreased Activity Tolerance  8. Decreased Flexibility/Joint Mobility  9. Edema/Girth  10. Decreased Knowledge of Precautions  11. Decreased [place] with Home Exercise Program INTERVENTIONS PLANNED:  1. Balance Exercise  2. Bed Mobility  3. Cold  4. Cryotherapy  5. Electrical Stimulation  6. Gait Training  7. Heat  8. Home Exercise Program (HEP)  9. Manual Therapy  10. Neuromuscular Re-education/Strengthening  11.  Range of Motion (ROM)  12. Therapeutic Activites  13. Therapeutic Exercise/Strengthening  14. Transfer Training  15. vaso pneumatic compression      TREATMENT PLAN:  Effective Dates: 1/22/2019 TO 4/22/2019 (90 days). Frequency/Duration: 2 times a week for 90 Days    GOALS: (Goals have been discussed and agreed upon with patient.)    SHORT-TERM FUNCTIONAL GOALS: Time Frame: 2-4 weeks   1. Pt will be independent with HEP. 2. Pt will report left knee pain in no longer constant. 3. Pt will report increased ease of sleep. 4. Pt will ambulate at lease 200 ft on level surface safely without need for assistive device. DISCHARGE GOALS: Time Frame: 6-8 weeks   1. Pt will improve LEFS score by at least 10 points. 2. Pt will ambulate stairs with recip gait pattern, ascending and descending no hand rail. 3. Pt will demonstrate improved functional strength by sit to stand transfers 5/5 with no use of UE.   4. Pt will demonstrate left knee flexion ROM to at least 110.  5. Pt mid patellar girth measurement will measure within 1 cm R-L to demonstrate decreased edema. REHABILITATION POTENTIAL FOR STATED GOALS: GooD    Regarding [address] therapy, I certify that the treatment plan above will be carried out by a therapist or under their direction. Thank you for this referral,  Edmond Green, PT     Referring Physician Signature: Tiera Martinez MD              Date                  The information in this section was collected on 1/22/19 (except where otherwise noted). HISTORY:   History of Present Injury/Illness (Reason for Referral):  L TKA 12/13/18. Both knees painful for 3-4 years, left worse. Using cane and rolling walker over las 6-7 months. Conservative measures failed leading to surgery. 2 days in hospital 2-3 weeks of home therapy.    Present symptoms (on day of initial evaluation): aching and throbbing, tight, swelling left knee, sleep disturbed   · Aggravating factors: not taking medication, weather changes, walking, standing, sit to stand transfers, sit to stand  · Relieving factors: ice, medication   · Pain level: 4/10 present, 6/10 worst, 4/10 best     Past Medical History/Comorbidities:   Ms. Catalina Baires  has a past medical history of Asthma, High cholesterol, History of anemia, History of hypokalemia, Hypertension, Morbid obesity ([address] (09/11/2014), Neuropathy, EMY on CPAP, Osteoarthritis, and Unspecified adverse effect of anesthesia. Ms. Catalina Baires  has a past surgical history that includes hx hernia repair. Social History/Living Environment:   Lives alone, but family close by, stairs to enter   Prior Level of Function/Work/Activity:  Not currently working, used cane/walker for 6+ months prior to surgery   Dominant Side:         RIGHT  Other Clinical Tests:          none  Previous Treatment Approaches:          Injections, home therapy     Ambulatory/Rehab Services H2 Model Falls Risk Assessment    Risk Factors:       No Risk Factors Identified Ability to Rise from Chair:       (1)  Pushes up, successful in one attempt    Falls Prevention Plan:       No modifications necessary   Total: (5 or greater = High Risk): 1    ©2010 [facility] Patent #[id number]. Federal Law prohibits the replication, distribution or use without written permission from [facility] [employer]       Current Medications:       Current Outpatient Medications:     promethazine (PHENERGAN) 25 mg tablet, Take 25 mg by mouth every six (6) hours as needed for Nausea., Disp: , Rfl:     HYDROmorphone (DILAUDID) 2 mg tablet, Take 1-2 Tabs by mouth every four (4) hours as needed. Max Daily Amount: 24 mg. (Patient taking differently: Take 1-2 Tabs by mouth every four (4) hours as needed for Pain.), Disp: 60 Tab, Rfl: 0    triamterene-hydroCHLOROthiazide (DYAZIDE) 37.5-25 mg per capsule, Take 1 Cap by mouth daily. , Disp: , Rfl:     albuterol (PROAIR HFA) 90 mcg/actuation inhaler, Take 1 Puff by inhalation every four (4) hours as needed for Wheezing., Disp: , Rfl:    Date Last Reviewed:  2/6/2019     EXAMINATION:   Observation/Orthostatic Postural Assessment:         Standing: abduction and ER of B hip joints in static standing        Sitting: slumped positioning         incision appears healed well, no discoloration or drainage   Palpation:    ·  Tender of entire left knee joint  · Min to mod edema, non pitting     Girth:   · Mid patella; 49 cm right,  51 cm left      ROM:            LE ROM  DATE  1/22/19 DATE     Hip flexion limited B to 80-90 deg   R:   L:    Hip Abduction  WNL B  R:   L:    Hip Extension  WFL  B R:   L:    Knee Flexion  R: 90 deg  L: 95 deg R:   L:    Knee Extension  R: 0  L: 0 R:   L:    Ankle  Limited B DF  R:   L:       Strength:            Lower quadrant    DATE  1/22/19   DATE     Hip flexion R: 4/5  L: 3+/5 R:   L:    Hip Abduction  R: 4/5  L: 4/5 R:   L:    Hip Extension  R: 4/5  L: 4/5 R:   L:    Knee Flexion  R: 4+/5  L: 4/5 R:   L:    Knee Extension  R: 4+/5  L: 4/5 R:   L:    Ankle Dorsiflexion  R: 4+/5  L:4+/5 R:   L:   Ankle Plantarflexion  R:4+/5  L:4+/5 R:  L:     Special Tests:          none  Neurological Screen:        No abnormalities   Functional Mobility:         Gait/Ambulation: walking with straight cane,         Transfers: heavy use of UE for sit to stand         Bed Mobility: slow, challenging         Stairs:  recip with UE and cane ascending, step to with UE descending    Outcome Measure: Tool Used: Lower Extremity Functional Scale (LEFS)  Score:  Initial: 18/80 Most Recent: X/80 (Date: -- )   Interpretation of Score: 20 questions each scored on a 5 point scale with 0 representing \"extreme difficulty or unable to perform\" and 4 representing \"no difficulty\". The lower the score, the greater the functional disability. 80/80 represents no disability. Minimal detectable change is 9 points.     Medical Necessity:   · Patient is expected to demonstrate progress in strength, range of motion, balance, coordination and functional technique to increase independence with all ADLs and ambulation . Reason for Services/Other Comments:  · Patient continues to require modification of therapeutic interventions to increase complexity of exercises. TREATMENT:   (In addition to Assessment/Re-Assessment sessions the following treatments were rendered)  Pre-treatment Symptoms/Complaints: pt states that she is doing well, was a little sore posterior knee after treatment last session. continues to report weaning from cane and only uses when out in community at this time. Pain: Initial:     2/10 Post Session:  0/10     ROM: 110 flexion     Therapeutic Exercise: ( 40 min):  Exercises per grid below to improve mobility, strength and balance. Required moderate visual, verbal and manual cues to promote proper body alignment, promote proper body posture, promote proper body mechanics and promote proper body breathing techniques. Progressed resistance, range and repetitions as indicated. Date:  1/22/19 Date:  1/28/19 Date:  2/1/19 2/6/19   Activity/Exercise Parameters Parameters Parameters    Education  POC, HEP,ice application with elevation, sit to stand t/f New HEP     nustep NV 10 min level 2   10 min level 2.5   SLR Demo do 2 x 5  X 10      Heel slide Demo do X 5 with op  X 5     SAQ  X 20 2# 2 x 10 3#    Clamshell  2 x 5     LAQ  X 10 B  3# x 20    Ham curl  YTB x 10 B  GTB x 20    Sit to stand    X 10, no UE    Gait    2 x 35 ' In ladder   Step tap   X 10 B with use of UE  X 20 B on 6\" step   Side stepping   2 x 35 ' In ladder x 2 laps    Knee flexion and ham stretch combo    Supine hip @ 90 with flex/ext knee     Step ups    X 20 left fwd,  X 20 left lateral 6\" step   Calf stretch     Slant board B 3 x 20 sec                     Manual techniques: 5 min  To reduce joint restriction due to scar adhesions.     · Scar mobilization all directions anterior left knee    [employer] Portal  Modalities:   · vasopneumatic compression x 10 min, Mod compression, 34 deg left knee to decrease edema and improve joint mobility. Treatment/Session Assessment:    · Response to Treatment:  Pt with good effort during all activities and showed definite improvement with weight shift and balance during standing/walking activities. Good functional strength during step ups with left LE. Edema decreased to minimal after vasopneumatic compression. May continue 1 x per week unless regression. · Compliance with Program/Exercises: Will assess as treatment progresses.   · Recommendations/Intent for next treatment session: proprioception activities, ROM focus on flexion, edema reduction, gait-wean from cane     Future Appointments   Date Time Provider Robert Carolina   2/13/2019  1:00 PM Luis Wood, PT [facility]       Total Treatment Duration:  55 min   PT Patient Time In/Time Out  Time In: 1100  Time Out: [address], PT

## 2019-02-13 ENCOUNTER — APPOINTMENT (OUTPATIENT)
Dept: PHYSICAL THERAPY | Age: 64
End: 2019-02-13
Payer: MEDICARE

## 2019-02-21 ENCOUNTER — HOSPITAL ENCOUNTER (OUTPATIENT)
Dept: PHYSICAL THERAPY | Age: 64
Discharge: HOME | End: 2019-02-21
Payer: MEDICARE

## 2019-02-21 PROCEDURE — 97110 THERAPEUTIC EXERCISES: CPT

## 2019-02-21 NOTE — PROGRESS NOTES
Otto Leon  : 1955  Payor: [facility] [place] MEDICARE / Plan: [employer] [place] / Product Type: Managed Care Medicare /    [address] [place] [address] at [address] [place] [address]. [address], [place], [address]  [phone]   [phone]         OUTPATIENT PHYSICAL THERAPY:Daily Note  2019    ICD-10: Treatment Diagnosis: Pain in Left Knee (M25.562)  Stiffness of Left Knee, Not elsewhere classified (M25.662)  Difficulty in walking, Not elsewhere classified (R26.2)  Other abnormalities of gait and mobility (R26.89)  h  Effusion of left knee   Precautions/Allergies:   Hydrocodone   MD Orders: evaluate and treat MEDICAL/REFERRING DIAGNOSIS:  Presence of left artificial knee joint [Z96.652]   DATE OF ONSET: 19  REFERRING PHYSICIAN: Vera Barrientos MD  RETURN PHYSICIAN APPOINTMENT: not specified     INITIAL ASSESSMENT:  Ms. [place] presents with functional limitations due to recent L TKA. Physical therapy evaluation reveals decreased ROM, strength, and difficulty with gait and transfers. Pt currently reporting constant pain and unable to sleep due to poor pain control. Pt will highly benefit from skilled PT to address problems below and reach maximum functional mobility. PROBLEM LIST (Impacting functional limitations):  1. Decreased Strength  2. Decreased ADL/Functional Activities  3. Decreased Transfer Abilities  4. Decreased Ambulation Ability/Technique  5. Decreased Balance  6. Increased Pain  7. Decreased Activity Tolerance  8. Decreased Flexibility/Joint Mobility  9. Edema/Girth  10. Decreased Knowledge of Precautions  11. Decreased [place] with Home Exercise Program INTERVENTIONS PLANNED:  1. Balance Exercise  2. Bed Mobility  3. Cold  4. Cryotherapy  5. Electrical Stimulation  6. Gait Training  7. Heat  8. Home Exercise Program (HEP)  9. Manual Therapy  10. Neuromuscular Re-education/Strengthening  11.  Range of Motion (ROM)  12. Therapeutic Activites  13. Therapeutic Exercise/Strengthening  14. Transfer Training  15. vaso pneumatic compression      TREATMENT PLAN:  Effective Dates: 1/22/2019 TO 4/22/2019 (90 days). Frequency/Duration: 2 times a week for 90 Days    GOALS: (Goals have been discussed and agreed upon with patient.)    SHORT-TERM FUNCTIONAL GOALS: Time Frame: 2-4 weeks   1. Pt will be independent with HEP. 2. Pt will report left knee pain in no longer constant. 3. Pt will report increased ease of sleep. 4. Pt will ambulate at lease 200 ft on level surface safely without need for assistive device. DISCHARGE GOALS: Time Frame: 6-8 weeks   1. Pt will improve LEFS score by at least 10 points. 2. Pt will ambulate stairs with recip gait pattern, ascending and descending no hand rail. 3. Pt will demonstrate improved functional strength by sit to stand transfers 5/5 with no use of UE.   4. Pt will demonstrate left knee flexion ROM to at least 110.  5. Pt mid patellar girth measurement will measure within 1 cm R-L to demonstrate decreased edema. REHABILITATION POTENTIAL FOR STATED GOALS: GooD    Regarding [address] [address] therapy, I certify that the treatment plan above will be carried out by a therapist or under their direction. Thank you for this referral,  Edmond Green, PT     Referring Physician Signature: Tiera Martinez MD              Date                  The information in this section was collected on 1/22/19 (except where otherwise noted). HISTORY:   History of Present Injury/Illness (Reason for Referral):  L TKA 12/13/18. Both knees painful for 3-4 years, left worse. Using cane and rolling walker over las 6-7 months. Conservative measures failed leading to surgery. 2 days in hospital 2-3 weeks of home therapy.    Present symptoms (on day of initial evaluation): aching and throbbing, tight, swelling left knee, sleep disturbed   · Aggravating factors: not taking medication, weather changes, walking, standing, sit to stand transfers, sit to stand  · Relieving factors: ice, medication   · Pain level: 4/10 present, 6/10 worst, 4/10 best     Past Medical History/Comorbidities:   Ms. [place]  has a past medical history of Asthma, High cholesterol, History of anemia, History of hypokalemia, Hypertension, Morbid obesity ([address] (09/11/2014), Neuropathy, EMY on CPAP, Osteoarthritis, and Unspecified adverse effect of anesthesia. Ms. [place]  has a past surgical history that includes hx hernia repair. Social History/Living Environment:   Lives alone, but family close by, stairs to enter   Prior Level of Function/Work/Activity:  Not currently working, used cane/walker for 6+ months prior to surgery   Dominant Side:         RIGHT  Other Clinical Tests:          none  Previous Treatment Approaches:          Injections, home therapy     Ambulatory/Rehab Services H2 Model Falls Risk Assessment    Risk Factors:       No Risk Factors Identified Ability to Rise from Chair:       (1)  Pushes up, successful in one attempt    Falls Prevention Plan:       No modifications necessary   Total: (5 or greater = High Risk): 1    ©2010 [facility] [place] Patent #[id number]. Federal Law prohibits the replication, distribution or use without written permission from [facility] [employer]       Current Medications:       Current Outpatient Medications:     promethazine (PHENERGAN) 25 mg tablet, Take 25 mg by mouth every six (6) hours as needed for Nausea., Disp: , Rfl:     HYDROmorphone (DILAUDID) 2 mg tablet, Take 1-2 Tabs by mouth every four (4) hours as needed. Max Daily Amount: 24 mg. (Patient taking differently: Take 1-2 Tabs by mouth every four (4) hours as needed for Pain.), Disp: 60 Tab, Rfl: 0    triamterene-hydroCHLOROthiazide (DYAZIDE) 37.5-25 mg per capsule, Take 1 Cap by mouth daily. , Disp: , Rfl:     albuterol (PROAIR HFA) 90 mcg/actuation inhaler, Take 1 Puff by inhalation every four (4) hours as needed for Wheezing., Disp: , Rfl:    Date Last Reviewed:  2/21/2019     EXAMINATION:   Observation/Orthostatic Postural Assessment:         Standing: abduction and ER of B hip joints in static standing        Sitting: slumped positioning         incision appears healed well, no discoloration or drainage   Palpation:    ·  Tender of entire left knee joint  · Min to mod edema, non pitting     Girth:   · Mid patella; 49 cm right,  51 cm left      ROM:            LE ROM  DATE  1/22/19 DATE     Hip flexion limited B to 80-90 deg   R:   L:    Hip Abduction  WNL B  R:   L:    Hip Extension  WFL  B R:   L:    Knee Flexion  R: 90 deg  L: 95 deg R:   L:    Knee Extension  R: 0  L: 0 R:   L:    Ankle  Limited B DF  R:   L:       Strength:            Lower quadrant    DATE  1/22/19   DATE     Hip flexion R: 4/5  L: 3+/5 R:   L:    Hip Abduction  R: 4/5  L: 4/5 R:   L:    Hip Extension  R: 4/5  L: 4/5 R:   L:    Knee Flexion  R: 4+/5  L: 4/5 R:   L:    Knee Extension  R: 4+/5  L: 4/5 R:   L:    Ankle Dorsiflexion  R: 4+/5  L:4+/5 R:   L:   Ankle Plantarflexion  R:4+/5  L:4+/5 R:  L:     Special Tests:          none  Neurological Screen:        No abnormalities   Functional Mobility:         Gait/Ambulation: walking with straight cane,         Transfers: heavy use of UE for sit to stand         Bed Mobility: slow, challenging         Stairs:  recip with UE and cane ascending, step to with UE descending    Outcome Measure: Tool Used: Lower Extremity Functional Scale (LEFS)  Score:  Initial: 18/80 Most Recent: X/80 (Date: -- )   Interpretation of Score: 20 questions each scored on a 5 point scale with 0 representing \"extreme difficulty or unable to perform\" and 4 representing \"no difficulty\". The lower the score, the greater the functional disability. 80/80 represents no disability. Minimal detectable change is 9 points.     Medical Necessity:   · Patient is expected to demonstrate progress in strength, range of motion, balance, coordination and functional technique to increase independence with all ADLs and ambulation . Reason for Services/Other Comments:  · Patient continues to require modification of therapeutic interventions to increase complexity of exercises. TREATMENT:   (In addition to Assessment/Re-Assessment sessions the following treatments were rendered)  Pre-treatment Symptoms/Complaints: pt states that she missed last visit due to illness. She has had a little sharp pain of left anterior knee at rest and also right knee was aching. Pt reports that she is consistent with her HEP and is continueing to wean from cane but has to use it most community distances due to dizziness lately. (MD is informed)  Pt also states that she needs to cut the appointment down to 30 min today due to a family obligation. Pain: Initial:     2/10 Post Session:  2/10     ROM: 110 flexion     Therapeutic Exercise: ( 30 min):  Exercises per grid below to improve mobility, strength and balance. Required moderate visual, verbal and manual cues to promote proper body alignment, promote proper body posture, promote proper body mechanics and promote proper body breathing techniques. Progressed resistance, range and repetitions as indicated.    Date:  1/22/19 Date:  1/28/19 Date:  2/1/19 2/6/19 2/21/19   Activity/Exercise Parameters Parameters Parameters     Education  POC, HEP,ice application with elevation, sit to stand t/f New HEP      nustep NV 10 min level 2   10 min level 2.5 10 min level 2.5    SLR Demo do 2 x 5  X 10       Heel slide Demo do X 5 with op  X 5      SAQ  X 20 2# 2 x 10 3#     Clamshell  2 x 5      LAQ  X 10 B  3# x 20     Ham curl  YTB x 10 B  GTB x 20     Sit to stand    X 10, no UE     Gait    2 x 35 ' In ladder X 150 ft focus on COG   Step tap   X 10 B with use of UE  X 20 B on 6\" step    Side stepping   2 x 35 ' In ladder x 2 laps     Knee flexion and ham stretch combo    Supine hip @ 90 with flex/ext knee      Step ups    X 20 left fwd,  X 20 left lateral 6\" step    Calf stretch     Slant board B 3 x 20 sec     Proprioceptive activities      Static standing on Unstable surface (BF), EO, EC, staggered standing               Manual techniques: 5 min  To reduce joint restriction due to scar adhesions. · Scar mobilization all directions anterior left knee    MedBridge Portal  Modalities:   · vasopneumatic compression x 10 min, Mod compression, 34 deg left knee to decrease edema and improve joint mobility. (not today)      Treatment/Session Assessment:    · Response to Treatment: since pt requested short session today, focused on proprioception activities and gait to help with weaning from cane if able. Pt demonstrated good static balance with EO and EC and unstable surface. Dynamically, gait stability demonstrated well today too. Pt did not c/o dizziness during any exercises. · Compliance with Program/Exercises: Will assess as treatment progresses.   · Recommendations/Intent for next treatment session: proprioception activities, ROM focus on flexion, edema reduction, gait-wean from cane     Future Appointments   Date Time Provider Robert Masoni   2/26/2019 10:30 AM Trish Brandon, PT [facility]   2/28/2019  1:00 PM Rommel Briggs DPT [facility]   3/6/2019 10:30 AM Trish Brandon PT [facility]   3/8/2019 10:30 AM Diana Schmitt, PT [facility]       Total Treatment Duration:  35 min   PT Patient Time In/Time Out  Time In: 1330  Time Out: 1500 [facility], PT

## 2019-02-26 ENCOUNTER — HOSPITAL ENCOUNTER (OUTPATIENT)
Dept: PHYSICAL THERAPY | Age: 64
Discharge: HOME | End: 2019-02-26
Payer: MEDICARE

## 2019-02-26 PROCEDURE — 97110 THERAPEUTIC EXERCISES: CPT

## 2019-02-26 NOTE — PROGRESS NOTES
Demetrio Larios  : 1955  [place] / Plan[place] Dilma Loyd [place] / Product Type: Managed Care Medicare /    [facility] at [address] [place] [address]. [facility], [address], [facility], [address] [facility]  [phone]   [phone]         OUTPATIENT PHYSICAL THERAPY:Daily Note  2019    ICD-10: Treatment Diagnosis: Pain in Left Knee (M25.562)  Stiffness of Left Knee, Not elsewhere classified (M25.662)  Difficulty in walking, Not elsewhere classified (R26.2)  Other abnormalities of gait and mobility (R26.89)  h  Effusion of left knee   Precautions/Allergies:   Hydrocodone   MD Orders: evaluate and treat MEDICAL/REFERRING DIAGNOSIS:  Presence of left artificial knee joint [Z96.652]   DATE OF ONSET: 19  REFERRING PHYSICIAN: Maureen Manriquez MD  RETURN PHYSICIAN APPOINTMENT: not specified     INITIAL ASSESSMENT:  Ms. Luzma Aguilar presents with functional limitations due to recent L TKA. Physical therapy evaluation reveals decreased ROM, strength, and difficulty with gait and transfers. Pt currently reporting constant pain and unable to sleep due to poor pain control. Pt will highly benefit from skilled PT to address problems below and reach maximum functional mobility. PROBLEM LIST (Impacting functional limitations):  1. Decreased Strength  2. Decreased ADL/Functional Activities  3. Decreased Transfer Abilities  4. Decreased Ambulation Ability/Technique  5. Decreased Balance  6. Increased Pain  7. Decreased Activity Tolerance  8. Decreased Flexibility/Joint Mobility  9. Edema/Girth  10. Decreased Knowledge of Precautions  11. Decreased [place] with Home Exercise Program INTERVENTIONS PLANNED:  1. Balance Exercise  2. Bed Mobility  3. Cold  4. Cryotherapy  5. Electrical Stimulation  6. Gait Training  7. Heat  8. Home Exercise Program (HEP)  9. Manual Therapy  10. Neuromuscular Re-education/Strengthening  11.  Range of Motion (ROM)  12. Therapeutic Activites  13. Therapeutic Exercise/Strengthening  14. Transfer Training  15. vaso pneumatic compression      TREATMENT PLAN:  Effective Dates: 1/22/2019 TO 4/22/2019 (90 days). Frequency/Duration: 2 times a week for 90 Days    GOALS: (Goals have been discussed and agreed upon with patient.)    SHORT-TERM FUNCTIONAL GOALS: Time Frame: 2-4 weeks   1. Pt will be independent with HEP. 2. Pt will report left knee pain in no longer constant. 3. Pt will report increased ease of sleep. 4. Pt will ambulate at lease 200 ft on level surface safely without need for assistive device. DISCHARGE GOALS: Time Frame: 6-8 weeks   1. Pt will improve LEFS score by at least 10 points. 2. Pt will ambulate stairs with recip gait pattern, ascending and descending no hand rail. 3. Pt will demonstrate improved functional strength by sit to stand transfers 5/5 with no use of UE.   4. Pt will demonstrate left knee flexion ROM to at least 110.  5. Pt mid patellar girth measurement will measure within 1 cm R-L to demonstrate decreased edema. REHABILITATION POTENTIAL FOR STATED GOALS: [facility]    Regarding [address], I certify that the treatment plan above will be carried out by a therapist or under their direction. Thank you for this referral,  Raleigh Hall PT     Referring Physician Signature: Tod Alfaro MD              Date                  The information in this section was collected on 1/22/19 (except where otherwise noted). HISTORY:   History of Present Injury/Illness (Reason for Referral):  L TKA 12/13/18. Both knees painful for 3-4 years, left worse. Using cane and rolling walker over las 6-7 months. Conservative measures failed leading to surgery. 2 days in hospital 2-3 weeks of home therapy.    Present symptoms (on day of initial evaluation): aching and throbbing, tight, swelling left knee, sleep disturbed   · Aggravating factors: not taking medication, weather changes, walking, standing, sit to stand transfers, sit to stand  · Relieving factors: ice, medication   · Pain level: 4/10 present, 6/10 worst, 4/10 best     Past Medical History/Comorbidities:   [facility]. [facility]  has a past medical history of Asthma, High cholesterol, History of anemia, History of hypokalemia, Hypertension, Morbid obesity ([address] (09/11/2014), Neuropathy, EMY on CPAP, Osteoarthritis, and Unspecified adverse effect of anesthesia. Ms. [facility]  has a past surgical history that includes hx hernia repair. Social History/Living Environment:   Lives alone, but family close by, stairs to enter   Prior Level of Function/Work/Activity:  Not currently working, used cane/walker for 6+ months prior to surgery   Dominant Side:         RIGHT  Other Clinical Tests:          none  Previous Treatment Approaches:          Injections, home therapy     Ambulatory/Rehab Services H2 Model Falls Risk Assessment    Risk Factors:       No Risk Factors Identified Ability to Rise from Chair:       (1)  Pushes up, successful in one attempt    Falls Prevention Plan:       No modifications necessary   Total: (5 or greater = High Risk): 1    ©2010 [facility] [place] [address] [place] [address] Patent #[id number]. Federal Law prohibits the replication, distribution or use without written permission from [facility] of [address] [place] [address]       Current Medications:       Current Outpatient Medications:     promethazine (PHENERGAN) 25 mg tablet, Take 25 mg by mouth every six (6) hours as needed for Nausea., Disp: , Rfl:     HYDROmorphone (DILAUDID) 2 mg tablet, Take 1-2 Tabs by mouth every four (4) hours as needed. Max Daily Amount: 24 mg. (Patient taking differently: Take 1-2 Tabs by mouth every four (4) hours as needed for Pain.), Disp: 60 Tab, Rfl: 0    triamterene-hydroCHLOROthiazide (DYAZIDE) 37.5-25 mg per capsule, Take 1 Cap by mouth daily. , Disp: , Rfl:     albuterol (PROAIR HFA) 90 mcg/actuation inhaler, Take 1 Puff by inhalation every four (4) hours as needed for Wheezing., Disp: , Rfl:    Date Last Reviewed:  2/26/2019     EXAMINATION:   Observation/Orthostatic Postural Assessment:         Standing: abduction and ER of B hip joints in static standing        Sitting: slumped positioning         incision appears healed well, no discoloration or drainage   Palpation:    ·  Tender of entire left knee joint  · Min to mod edema, non pitting     Girth:   · Mid patella; 49 cm right,  51 cm left      ROM:            LE ROM  DATE  1/22/19 DATE     Hip flexion limited B to 80-90 deg   R:   L:    Hip Abduction  WNL B  R:   L:    Hip Extension  WFL  B R:   L:    Knee Flexion  R: 90 deg  L: 95 deg R:   L:    Knee Extension  R: 0  L: 0 R:   L:    Ankle  Limited B DF  R:   L:       Strength:            Lower quadrant    DATE  1/22/19   DATE     Hip flexion R: 4/5  L: 3+/5 R:   L:    Hip Abduction  R: 4/5  L: 4/5 R:   L:    Hip Extension  R: 4/5  L: 4/5 R:   L:    Knee Flexion  R: 4+/5  L: 4/5 R:   L:    Knee Extension  R: 4+/5  L: 4/5 R:   L:    Ankle Dorsiflexion  R: 4+/5  L:4+/5 R:   L:   Ankle Plantarflexion  R:4+/5  L:4+/5 R:  L:     Special Tests:          none  Neurological Screen:        No abnormalities   Functional Mobility:         Gait/Ambulation: walking with straight cane,         Transfers: heavy use of UE for sit to stand         Bed Mobility: slow, challenging         Stairs:  recip with UE and cane ascending, step to with UE descending    Outcome Measure: Tool Used: Lower Extremity Functional Scale (LEFS)  Score:  Initial: 18/80 Most Recent: X/80 (Date: -- )   Interpretation of Score: 20 questions each scored on a 5 point scale with 0 representing \"extreme difficulty or unable to perform\" and 4 representing \"no difficulty\". The lower the score, the greater the functional disability. 80/80 represents no disability. Minimal detectable change is 9 points.     Medical Necessity:   · Patient is expected to demonstrate progress in strength, range of motion, balance, coordination and functional technique to increase independence with all ADLs and ambulation . Reason for Services/Other Comments:  · Patient continues to require modification of therapeutic interventions to increase complexity of exercises. TREATMENT:   (In addition to Assessment/Re-Assessment sessions the following treatments were rendered)  Pre-treatment Symptoms/Complaints: pt distracted during therapy session today due to family issues. No other significant complaints. Pain: Initial:     2/10 Post Session:  2/10     ROM: 110 flexion     Therapeutic Exercise: ( 40 min):  Exercises per grid below to improve mobility, strength and balance. Required moderate visual, verbal and manual cues to promote proper body alignment, promote proper body posture, promote proper body mechanics and promote proper body breathing techniques. Progressed resistance, range and repetitions as indicated.    Date:  1/22/19 Date:  1/28/19 Date:  2/1/19 2/6/19 2/21/19 2/26/19   Activity/Exercise Parameters Parameters Parameters      Education  POC, HEP,ice application with elevation, sit to stand t/f New HEP       nustep NV 10 min level 2   10 min level 2.5 10 min level 2.5  10 min level 3   SLR Demo do 2 x 5  X 10        Heel slide Demo do X 5 with op  X 5    With OP x 5   SAQ  X 20 2# 2 x 10 3#      Clamshell  2 x 5       LAQ  X 10 B  3# x 20      Ham curl  YTB x 10 B  GTB x 20      Sit to stand    X 10, no UE      Gait    2 x 35 ' In ladder X 150 ft focus on COG 2 x 40 ft     X 4 laps in ladder    Step tap   X 10 B with use of UE  X 20 B on 6\" step  Anterior and alteral x 10 B    Side stepping   2 x 35 ' In ladder x 2 laps   In ladder x 4 laps   Knee flexion and ham stretch combo    Supine hip @ 90 with flex/ext knee       Step ups    X 20 left fwd,  X 20 left lateral 6\" step     Calf stretch     Slant board B 3 x 20 sec      Proprioceptive activities      Static standing on Unstable surface (BF), EO, EC, staggered standing                 Manual techniques: 5 min  To reduce joint restriction due to scar adhesions. · Scar mobilization all directions anterior left knee    MedBridge Portal  Modalities:   · vasopneumatic compression x 10 min, Mod compression, 34 deg left knee to decrease edema and improve joint mobility. (not today)      Treatment/Session Assessment:    · Response to Treatment:  Pt performed all exercises today but with intermittent effort level due to mental stress (family related). Pt required multiple cues on technique and seemed to be fatigued. Pt did not c/o dizziness during any exercises. · Compliance with Program/Exercises: Will assess as treatment progresses.   · Recommendations/Intent for next treatment session: proprioception activities, ROM focus on flexion, edema reduction, gait-wean from cane     Future Appointments   Date Time Provider Robert Figueroa   2/28/2019  1:00 PM Kristi Roland, [facility]   3/6/2019 10:30 AM Gisell Mayorga, PT [facility]   3/8/2019 10:30 AM Katarina Fagan, PT [facility]       Total Treatment Duration:  45 min   PT Patient Time In/Time Out  Time In: 1030  Time Out: 1800 [facility], PT

## 2019-02-28 ENCOUNTER — HOSPITAL ENCOUNTER (OUTPATIENT)
Dept: PHYSICAL THERAPY | Age: 64
Discharge: HOME | End: 2019-02-28
Payer: MEDICARE

## 2019-02-28 PROCEDURE — 97110 THERAPEUTIC EXERCISES: CPT

## 2019-02-28 NOTE — PROGRESS NOTES
Myrna Karendianna  : 1955  Payor: [facility] [place] MEDICARE / Plan: [place] / Product Type: Managed Care Medicare /    [address] at [facility]. [facility], [address], [facility], [address]  [phone]   [phone]         OUTPATIENT PHYSICAL THERAPY:Daily Note  2019    ICD-10: Treatment Diagnosis: Pain in Left Knee (M25.562)  Stiffness of Left Knee, Not elsewhere classified (M25.662)  Difficulty in walking, Not elsewhere classified (R26.2)  Other abnormalities of gait and mobility (R26.89)  h  Effusion of left knee   Precautions/Allergies:   Hydrocodone   MD Orders: evaluate and treat MEDICAL/REFERRING DIAGNOSIS:  Presence of left artificial knee joint [Z96.652]   DATE OF ONSET: 19  REFERRING PHYSICIAN: Araseli Walters MD  RETURN PHYSICIAN APPOINTMENT: not specified     INITIAL ASSESSMENT:  Ms. Ronal Cuevas presents with functional limitations due to recent L TKA. Physical therapy evaluation reveals decreased ROM, strength, and difficulty with gait and transfers. Pt currently reporting constant pain and unable to sleep due to poor pain control. Pt will highly benefit from skilled PT to address problems below and reach maximum functional mobility. PROBLEM LIST (Impacting functional limitations):  1. Decreased Strength  2. Decreased ADL/Functional Activities  3. Decreased Transfer Abilities  4. Decreased Ambulation Ability/Technique  5. Decreased Balance  6. Increased Pain  7. Decreased Activity Tolerance  8. Decreased Flexibility/Joint Mobility  9. Edema/Girth  10. Decreased Knowledge of Precautions  11. Decreased [place] with Home Exercise Program INTERVENTIONS PLANNED:  1. Balance Exercise  2. Bed Mobility  3. Cold  4. Cryotherapy  5. Electrical Stimulation  6. Gait Training  7. Heat  8. Home Exercise Program (HEP)  9. Manual Therapy  10. Neuromuscular Re-education/Strengthening  11.  Range of Motion (ROM)  12. Therapeutic Activites  13. Therapeutic Exercise/Strengthening  14. Transfer Training  15. vaso pneumatic compression      TREATMENT PLAN:  Effective Dates: 1/22/2019 TO 4/22/2019 (90 days). Frequency/Duration: 2 times a week for 90 Days    GOALS: (Goals have been discussed and agreed upon with patient.)    SHORT-TERM FUNCTIONAL GOALS: Time Frame: 2-4 weeks   1. Pt will be independent with HEP. 2. Pt will report left knee pain in no longer constant. 3. Pt will report increased ease of sleep. 4. Pt will ambulate at lease 200 ft on level surface safely without need for assistive device. DISCHARGE GOALS: Time Frame: 6-8 weeks   1. Pt will improve LEFS score by at least 10 points. 2. Pt will ambulate stairs with recip gait pattern, ascending and descending no hand rail. 3. Pt will demonstrate improved functional strength by sit to stand transfers 5/5 with no use of UE.   4. Pt will demonstrate left knee flexion ROM to at least 110.  5. Pt mid patellar girth measurement will measure within 1 cm R-L to demonstrate decreased edema. The information in this section was collected on 1/22/19 (except where otherwise noted). HISTORY:   History of Present Injury/Illness (Reason for Referral):  L TKA 12/13/18. Both knees painful for 3-4 years, left worse. Using cane and rolling walker over las 6-7 months. Conservative measures failed leading to surgery. 2 days in hospital 2-3 weeks of home therapy.    Present symptoms (on day of initial evaluation): aching and throbbing, tight, swelling left knee, sleep disturbed   · Aggravating factors: not taking medication, weather changes, walking, standing, sit to stand transfers, sit to stand  · Relieving factors: ice, medication   · Pain level: 4/10 present, 6/10 worst, 4/10 best     Past Medical History/Comorbidities:   Ms. Eliecer Rousseau  has a past medical history of Asthma, High cholesterol, History of anemia, History of hypokalemia, Hypertension, Morbid obesity ([facility] [address] (09/11/2014), Neuropathy, EMY on CPAP, Osteoarthritis, and Unspecified adverse effect of anesthesia. Ms. Mary Suh  has a past surgical history that includes hx hernia repair. Social History/Living Environment:   Lives alone, but family close by, stairs to enter   Prior Level of Function/Work/Activity:  Not currently working, used cane/walker for 6+ months prior to surgery   Dominant Side:         RIGHT  Other Clinical Tests:          none  Previous Treatment Approaches:          Injections, home therapy     Ambulatory/Rehab Services H2 Model Falls Risk Assessment    Risk Factors:       No Risk Factors Identified Ability to Rise from Chair:       (1)  Pushes up, successful in one attempt    Falls Prevention Plan:       No modifications necessary   Total: (5 or greater = High Risk): 1    ©2010 [facility] [place] [address] [place] [address] [place] Patent #[id number]. Federal Law prohibits the replication, distribution or use without written permission from [facility] [employer]       Current Medications:       Current Outpatient Medications:     promethazine (PHENERGAN) 25 mg tablet, Take 25 mg by mouth every six (6) hours as needed for Nausea., Disp: , Rfl:     HYDROmorphone (DILAUDID) 2 mg tablet, Take 1-2 Tabs by mouth every four (4) hours as needed. Max Daily Amount: 24 mg. (Patient taking differently: Take 1-2 Tabs by mouth every four (4) hours as needed for Pain.), Disp: 60 Tab, Rfl: 0    triamterene-hydroCHLOROthiazide (DYAZIDE) 37.5-25 mg per capsule, Take 1 Cap by mouth daily. , Disp: , Rfl:     albuterol (PROAIR HFA) 90 mcg/actuation inhaler, Take 1 Puff by inhalation every four (4) hours as needed for Wheezing., Disp: , Rfl:    Date Last Reviewed:  2/28/2019     EXAMINATION:   Observation/Orthostatic Postural Assessment:         Standing: abduction and ER of B hip joints in static standing        Sitting: slumped positioning         incision appears healed well, no discoloration or drainage   Palpation:    ·  Tender of entire left knee joint  · Min to mod edema, non pitting     Girth:   · Mid patella; 49 cm right,  51 cm left      ROM:            LE ROM  DATE  1/22/19 DATE     Hip flexion limited B to 80-90 deg   R:   L:    Hip Abduction  WNL B  R:   L:    Hip Extension  WFL  B R:   L:    Knee Flexion  R: 90 deg  L: 95 deg R:   L:    Knee Extension  R: 0  L: 0 R:   L:    Ankle  Limited B DF  R:   L:       Strength:            Lower quadrant    DATE  1/22/19   DATE     Hip flexion R: 4/5  L: 3+/5 R:   L:    Hip Abduction  R: 4/5  L: 4/5 R:   L:    Hip Extension  R: 4/5  L: 4/5 R:   L:    Knee Flexion  R: 4+/5  L: 4/5 R:   L:    Knee Extension  R: 4+/5  L: 4/5 R:   L:    Ankle Dorsiflexion  R: 4+/5  L:4+/5 R:   L:   Ankle Plantarflexion  R:4+/5  L:4+/5 R:  L:     Special Tests:          none  Neurological Screen:        No abnormalities   Functional Mobility:         Gait/Ambulation: walking with straight cane,         Transfers: heavy use of UE for sit to stand         Bed Mobility: slow, challenging         Stairs:  recip with UE and cane ascending, step to with UE descending    Outcome Measure: Tool Used: Lower Extremity Functional Scale (LEFS)  Score:  Initial: 18/80 Most Recent: X/80 (Date: -- )   Interpretation of Score: 20 questions each scored on a 5 point scale with 0 representing \"extreme difficulty or unable to perform\" and 4 representing \"no difficulty\". The lower the score, the greater the functional disability. 80/80 represents no disability. Minimal detectable change is 9 points. Medical Necessity:   · Patient is expected to demonstrate progress in strength, range of motion, balance, coordination and functional technique to increase independence with all ADLs and ambulation . Reason for Services/Other Comments:  · Patient continues to require modification of therapeutic interventions to increase complexity of exercises.             TREATMENT:   (In addition to Assessment/Re-Assessment sessions the following treatments were rendered)  Pre-treatment Symptoms/Complaints: pt distracted during therapy session today due to family issues. No other significant complaints. Pain: Initial:   Pain Intensity 1: 3/10 Post Session:  2/10. pain     ROM: 110 flexion     Therapeutic Exercise: ( 40 min):  Exercises per grid below to improve mobility, strength and balance. Required moderate visual, verbal and manual cues to promote proper body alignment, promote proper body posture, promote proper body mechanics and promote proper body breathing techniques. Progressed resistance, range and repetitions as indicated. Date:  1/22/19 Date:  1/28/19 Date:  2/1/19 2/6/19 2/21/19 2/26/19 2/28/19   Activity/Exercise Parameters Parameters Parameters       Education  POC, HEP,ice application with elevation, sit to stand t/f New HEP        nustep NV 10 min level 2   10 min level 2.5 10 min level 2.5  10 min level 3 10 min level 3   SLR Demo do 2 x 5  X 10         Heel slide Demo do X 5 with op  X 5    With OP x 5 +OP at endrange 5x*   SAQ  X 20 2# 2 x 10 3#       Clamshell  2 x 5        LAQ  X 10 B  3# x 20    3# 10\"x10   Ham curl  YTB x 10 B  GTB x 20       Sit to stand    X 10, no UE    10x   Gait    2 x 35 ' In ladder X 150 ft focus on COG 2 x 40 ft     X 4 laps in ladder  *1 lap   Step tap   X 10 B with use of UE  X 20 B on 6\" step  Anterior and alteral x 10 B  6\" 10Bx2 E forward and lateral.   Side stepping   2 x 35 ' In ladder x 2 laps   In ladder x 4 laps In ladder x 4 laps   Knee flexion and ham stretch combo    Supine hip @ 90 with flex/ext knee        Step ups    X 20 left fwd,  X 20 left lateral 6\" step      Calf stretch     Slant board B 3 x 20 sec       Proprioceptive activities      Static standing on Unstable surface (BF), EO, EC, staggered standing      HRs       10x       Manual techniques: --- min  To reduce joint restriction due to scar adhesions.     · Scar mobilization all directions anterior left knee    MedBridge Portal  Modalities:   · vasopneumatic compression x 10 min, Mod compression, 34 deg left knee to decrease edema and improve joint mobility. (not today)      Treatment/Session Assessment:    · Response to Treatment:  Reinforcement required throughout session to stay motivated. Continued to wean off cane per primary therapist recommendation. Marlene Yeager moderate swelling--she declined ice and verbalized she will perform at home post-therapy session. · Compliance with Program/Exercises: Will assess as treatment progresses.   · Recommendations/Intent for next treatment session: proprioception activities, ROM focus on flexion, edema reduction, gait-wean from cane     Future Appointments   Date Time Provider Robert Figueroa   3/6/2019 10:30 AM Cecilia Segal, PT [facility]   3/8/2019 10:30 AM [facility] Lee[facility]       Total Treatment Duration:  40 min   PT Patient Time In/Time Out  Time In: 1300  Time Out: 98 Devyne Eugenia Baird, VICKYT

## 2019-03-04 NOTE — PROGRESS NOTES
Luan Velazquez  : 1955  Payor: [employer] [place] / Plan: [place] / Product Type: Managed Care Medicare /    [address] [place] [address] at [address] [place] [address]. [facility], [address], [place], [address]  [phone]   [phone]         OUTPATIENT PHYSICAL THERAPY:Daily Note and progress report  3/4/2019    ICD-10: Treatment Diagnosis: Pain in Left Knee (M25.562)  Stiffness of Left Knee, Not elsewhere classified (M25.662)  Difficulty in walking, Not elsewhere classified (R26.2)  Other abnormalities of gait and mobility (R26.89)  h  Effusion of left knee   Precautions/Allergies:   Hydrocodone   MD Orders: evaluate and treat MEDICAL/REFERRING DIAGNOSIS:  Presence of left artificial knee joint [Z96.652]   DATE OF ONSET: 19  REFERRING PHYSICIAN: Jess Dukes MD  RETURN PHYSICIAN APPOINTMENT: not specified      ASSESSMENT:  Ms. Jo Scanlon has attended 7 scheduled PT visits following left TKA to regain functional mobility. Overall, she has been progressing with strength and ROM of knee as well as gait and balance, reaching her short term goals. Pt has not reached all functional goals at this time and would highly benefit from continued PT to address functional deficits and wean from assistive devices. Recommending 2 x per week for 2-3 more weeks. See below for most recent objective measures. PROBLEM LIST (Impacting functional limitations):  1. Decreased Strength  2. Decreased ADL/Functional Activities  3. Decreased Transfer Abilities  4. Decreased Ambulation Ability/Technique  5. Decreased Balance  6. Increased Pain  7. Decreased Activity Tolerance  8. Decreased Flexibility/Joint Mobility  9. Edema/Girth  10. Decreased Knowledge of Precautions  11. Decreased [place] with Home Exercise Program INTERVENTIONS PLANNED:  1. Balance Exercise  2. Bed Mobility  3. Cold  4. Cryotherapy  5. Electrical Stimulation  6.  Gait Training  7. Heat  8. Home Exercise Program (HEP)  9. Manual Therapy  10. Neuromuscular Re-education/Strengthening  11. Range of Motion (ROM)  12. Therapeutic Activites  13. Therapeutic Exercise/Strengthening  14. Transfer Training  15. vaso pneumatic compression      TREATMENT PLAN:  Effective Dates: 1/22/2019 TO 4/22/2019 (90 days). Frequency/Duration: 2 times a week for 90 Days    GOALS: (Goals have been discussed and agreed upon with patient.)    SHORT-TERM FUNCTIONAL GOALS: Time Frame: 2-4 weeks   1. Pt will be independent with HEP. 2. Pt will report left knee pain in no longer constant. 3. Pt will report increased ease of sleep. 4. Pt will ambulate at lease 200 ft on level surface safely without need for assistive device. DISCHARGE GOALS: Time Frame: 6-8 weeks   1. Pt will improve LEFS score by at least 10 points. 2. Pt will ambulate stairs with recip gait pattern, ascending and descending no hand rail. 3. Pt will demonstrate improved functional strength by sit to stand transfers 5/5 with no use of UE.   4. Pt will demonstrate left knee flexion ROM to at least 110.  5. Pt mid patellar girth measurement will measure within 1 cm R-L to demonstrate decreased edema. The information in this section was collected on 1/22/19 (except where otherwise noted). HISTORY:   History of Present Injury/Illness (Reason for Referral):  L TKA 12/13/18. Both knees painful for 3-4 years, left worse. Using cane and rolling walker over las 6-7 months. Conservative measures failed leading to surgery. 2 days in hospital 2-3 weeks of home therapy.    Present symptoms (on day of initial evaluation): aching and throbbing, tight, swelling left knee, sleep disturbed   · Aggravating factors: not taking medication, weather changes, walking, standing, sit to stand transfers, sit to stand  · Relieving factors: ice, medication   · Pain level: 4/10 present, 6/10 worst, 4/10 best     Past Medical History/Comorbidities: Ms. Katya Elliott  has a past medical history of Asthma, High cholesterol, History of anemia, History of hypokalemia, Hypertension, Morbid obesity ([address] (09/11/2014), Neuropathy, EMY on CPAP, Osteoarthritis, and Unspecified adverse effect of anesthesia. Ms. Katya Elliott  has a past surgical history that includes hx hernia repair. Social History/Living Environment:   Lives alone, but family close by, stairs to enter   Prior Level of Function/Work/Activity:  Not currently working, used cane/walker for 6+ months prior to surgery   Dominant Side:         RIGHT  Other Clinical Tests:          none  Previous Treatment Approaches:          Injections, home therapy     Ambulatory/Rehab Services H2 Model Falls Risk Assessment    Risk Factors:       No Risk Factors Identified Ability to Rise from Chair:       (1)  Pushes up, successful in one attempt    Falls Prevention Plan:       No modifications necessary   Total: (5 or greater = High Risk): 1    ©2010 [facility] Patent #[id number]. Federal Law prohibits the replication, distribution or use without written permission from [facility]       Current Medications:       Current Outpatient Medications:     promethazine (PHENERGAN) 25 mg tablet, Take 25 mg by mouth every six (6) hours as needed for Nausea., Disp: , Rfl:     HYDROmorphone (DILAUDID) 2 mg tablet, Take 1-2 Tabs by mouth every four (4) hours as needed. Max Daily Amount: 24 mg. (Patient taking differently: Take 1-2 Tabs by mouth every four (4) hours as needed for Pain.), Disp: 60 Tab, Rfl: 0    triamterene-hydroCHLOROthiazide (DYAZIDE) 37.5-25 mg per capsule, Take 1 Cap by mouth daily. , Disp: , Rfl:     albuterol (PROAIR HFA) 90 mcg/actuation inhaler, Take 1 Puff by inhalation every four (4) hours as needed for Wheezing., Disp: , Rfl:    Date Last Reviewed:  2/26/2019     EXAMINATION: updated 2/26/19   Observation/Orthostatic Postural Assessment:         Standing: cuing needed to weight bear equally, good static balance demonstrated        Sitting: slumped positioning           Palpation:    · Min to mod edema, non pitting     Girth:   · Mid patella; 49 cm right,  50 cm left      ROM:            LE ROM  DATE  1/22/19 DATE  2/26/19   Hip flexion limited B to 80-90 deg   R:   L:    Hip Abduction  WNL B  R:   L:    Hip Extension  WFL  B R:   L:    Knee Flexion  R: 90 deg  L: 95 deg R: 90 deg  L:  110 deg   Knee Extension  R: 0  L: 0 R:   L:    Ankle  Limited B DF  R:   L:       Strength:            Lower quadrant    DATE  1/22/19   DATE  2/26/19   Hip flexion R: 4/5  L: 3+/5 R: 4  L: 4   Hip Abduction  R: 4/5  L: 4/5 R: 4+  L:  4+   Hip Extension  R: 4/5  L: 4/5 R: 4+  L: 4+   Knee Flexion  R: 4+/5  L: 4/5 R: 4+  L: 4+   Knee Extension  R: 4+/5  L: 4/5 R: 4+  L: 4+   Ankle Dorsiflexion  R: 4+/5  L:4+/5 R: 4+  L: 4+   Ankle Plantarflexion  R:4+/5  L:4+/5 R: 4+  L: 4+     Special Tests:          none  Neurological Screen:        No abnormalities   Functional Mobility:         Gait/Ambulation: symmetrical and safe on level ground with verbal cuing, pt continues to use cane with community ambulation          Transfers:moderate use of UE for sit to stand         Bed Mobility: slow, challenging         Stairs:  recip with UE and cane ascending, step to with UE descending    Outcome Measure: Tool Used: Lower Extremity Functional Scale (LEFS)  Score:  Initial: 18/80 Most Recent: X/80 (Date: -- )   Interpretation of Score: 20 questions each scored on a 5 point scale with 0 representing \"extreme difficulty or unable to perform\" and 4 representing \"no difficulty\". The lower the score, the greater the functional disability. 80/80 represents no disability. Minimal detectable change is 9 points.     Medical Necessity:   · Patient is expected to demonstrate progress in strength, range of motion, balance, coordination and functional technique to increase independence with all ADLs and ambulation . Reason for Services/Other Comments:  · Patient continues to require modification of therapeutic interventions to increase complexity of exercises.

## 2019-03-08 ENCOUNTER — HOSPITAL ENCOUNTER (OUTPATIENT)
Dept: PHYSICAL THERAPY | Age: 64
Discharge: HOME | End: 2019-03-08
Payer: MEDICARE

## 2019-03-08 PROCEDURE — 97110 THERAPEUTIC EXERCISES: CPT

## 2019-03-08 NOTE — PROGRESS NOTES
Daviddevon Cameron  : 1955  Payor: [place] / Plan[place] Dawn Nav[place] / Product Type: Managed Care Medicare /    Gm Ayalauser at [place] [address]. [facility], [address], [facility], [address]  [phone]   [phone]         OUTPATIENT PHYSICAL THERAPY:Daily Note  3/8/2019    ICD-10: Treatment Diagnosis: Pain in Left Knee (M25.562)  Stiffness of Left Knee, Not elsewhere classified (M25.662)  Difficulty in walking, Not elsewhere classified (R26.2)  Other abnormalities of gait and mobility (R26.89)  h  Effusion of left knee   Precautions/Allergies:   Hydrocodone   MD Orders: evaluate and treat MEDICAL/REFERRING DIAGNOSIS:  Presence of left artificial knee joint [Z96.652]   DATE OF ONSET: 19  REFERRING PHYSICIAN: Shanta Fenton MD  RETURN PHYSICIAN APPOINTMENT: not specified     INITIAL ASSESSMENT:  Ms. Maureen Osborne presents with functional limitations due to recent L TKA. Physical therapy evaluation reveals decreased ROM, strength, and difficulty with gait and transfers. Pt currently reporting constant pain and unable to sleep due to poor pain control. Pt will highly benefit from skilled PT to address problems below and reach maximum functional mobility. PROBLEM LIST (Impacting functional limitations):  1. Decreased Strength  2. Decreased ADL/Functional Activities  3. Decreased Transfer Abilities  4. Decreased Ambulation Ability/Technique  5. Decreased Balance  6. Increased Pain  7. Decreased Activity Tolerance  8. Decreased Flexibility/Joint Mobility  9. Edema/Girth  10. Decreased Knowledge of Precautions  11. Decreased [place] with Home Exercise Program INTERVENTIONS PLANNED:  1. Balance Exercise  2. Bed Mobility  3. Cold  4. Cryotherapy  5. Electrical Stimulation  6. Gait Training  7. Heat  8. Home Exercise Program (HEP)  9. Manual Therapy  10. Neuromuscular Re-education/Strengthening  11.  Range of Motion (ROM)  12. Therapeutic Activites  13. Therapeutic Exercise/Strengthening  14. Transfer Training  15. vaso pneumatic compression      TREATMENT PLAN:  Effective Dates: 1/22/2019 TO 4/22/2019 (90 days). Frequency/Duration: 2 times a week for 90 Days    GOALS: (Goals have been discussed and agreed upon with patient.)    SHORT-TERM FUNCTIONAL GOALS: Time Frame: 2-4 weeks   1. Pt will be independent with HEP. 2. Pt will report left knee pain in no longer constant. 3. Pt will report increased ease of sleep. 4. Pt will ambulate at lease 200 ft on level surface safely without need for assistive device. DISCHARGE GOALS: Time Frame: 6-8 weeks   1. Pt will improve LEFS score by at least 10 points. 2. Pt will ambulate stairs with recip gait pattern, ascending and descending no hand rail. 3. Pt will demonstrate improved functional strength by sit to stand transfers 5/5 with no use of UE.   4. Pt will demonstrate left knee flexion ROM to at least 110.  5. Pt mid patellar girth measurement will measure within 1 cm R-L to demonstrate decreased edema. The information in this section was collected on 1/22/19 (except where otherwise noted). HISTORY:   History of Present Injury/Illness (Reason for Referral):  L TKA 12/13/18. Both knees painful for 3-4 years, left worse. Using cane and rolling walker over las 6-7 months. Conservative measures failed leading to surgery. 2 days in hospital 2-3 weeks of home therapy.    Present symptoms (on day of initial evaluation): aching and throbbing, tight, swelling left knee, sleep disturbed   · Aggravating factors: not taking medication, weather changes, walking, standing, sit to stand transfers, sit to stand  · Relieving factors: ice, medication   · Pain level: 4/10 present, 6/10 worst, 4/10 best     Past Medical History/Comorbidities:   Ms. Cady Javier  has a past medical history of Asthma, High cholesterol, History of anemia, History of hypokalemia, Hypertension, Morbid obesity ([facility] [address] (09/11/2014), Neuropathy, EMY on CPAP, Osteoarthritis, and Unspecified adverse effect of anesthesia. Ms. Dayan Cam  has a past surgical history that includes hx hernia repair. Social History/Living Environment:   Lives alone, but family close by, stairs to enter   Prior Level of Function/Work/Activity:  Not currently working, used cane/walker for 6+ months prior to surgery   Dominant Side:         RIGHT  Other Clinical Tests:          none  Previous Treatment Approaches:          Injections, home therapy     Ambulatory/Rehab Services H2 Model Falls Risk Assessment    Risk Factors:       No Risk Factors Identified Ability to Rise from Chair:       (1)  Pushes up, successful in one attempt    Falls Prevention Plan:       No modifications necessary   Total: (5 or greater = High Risk): 1    ©2010 [facility] [address] [place] [address] States Patent #[id number]. Federal Law prohibits the replication, distribution or use without written permission from [facility]       Current Medications:       Current Outpatient Medications:     promethazine (PHENERGAN) 25 mg tablet, Take 25 mg by mouth every six (6) hours as needed for Nausea., Disp: , Rfl:     HYDROmorphone (DILAUDID) 2 mg tablet, Take 1-2 Tabs by mouth every four (4) hours as needed. Max Daily Amount: 24 mg. (Patient taking differently: Take 1-2 Tabs by mouth every four (4) hours as needed for Pain.), Disp: 60 Tab, Rfl: 0    triamterene-hydroCHLOROthiazide (DYAZIDE) 37.5-25 mg per capsule, Take 1 Cap by mouth daily. , Disp: , Rfl:     albuterol (PROAIR HFA) 90 mcg/actuation inhaler, Take 1 Puff by inhalation every four (4) hours as needed for Wheezing., Disp: , Rfl:    Date Last Reviewed:  3/8/2019     EXAMINATION:   Observation/Orthostatic Postural Assessment:         Standing: abduction and ER of B hip joints in static standing        Sitting: slumped positioning         incision appears healed well, no discoloration or drainage   Palpation:    ·  Tender of entire left knee joint  · Min to mod edema, non pitting     Girth:   · Mid patella; 49 cm right,  51 cm left      ROM:            LE ROM  DATE  1/22/19 DATE     Hip flexion limited B to 80-90 deg   R:   L:    Hip Abduction  WNL B  R:   L:    Hip Extension  WFL  B R:   L:    Knee Flexion  R: 90 deg  L: 95 deg R:   L:    Knee Extension  R: 0  L: 0 R:   L:    Ankle  Limited B DF  R:   L:       Strength:            Lower quadrant    DATE  1/22/19   DATE     Hip flexion R: 4/5  L: 3+/5 R:   L:    Hip Abduction  R: 4/5  L: 4/5 R:   L:    Hip Extension  R: 4/5  L: 4/5 R:   L:    Knee Flexion  R: 4+/5  L: 4/5 R:   L:    Knee Extension  R: 4+/5  L: 4/5 R:   L:    Ankle Dorsiflexion  R: 4+/5  L:4+/5 R:   L:   Ankle Plantarflexion  R:4+/5  L:4+/5 R:  L:     Special Tests:          none  Neurological Screen:        No abnormalities   Functional Mobility:         Gait/Ambulation: walking with straight cane,         Transfers: heavy use of UE for sit to stand         Bed Mobility: slow, challenging         Stairs:  recip with UE and cane ascending, step to with UE descending    Outcome Measure: Tool Used: Lower Extremity Functional Scale (LEFS)  Score:  Initial: 18/80 Most Recent: X/80 (Date: -- )   Interpretation of Score: 20 questions each scored on a 5 point scale with 0 representing \"extreme difficulty or unable to perform\" and 4 representing \"no difficulty\". The lower the score, the greater the functional disability. 80/80 represents no disability. Minimal detectable change is 9 points. Medical Necessity:   · Patient is expected to demonstrate progress in strength, range of motion, balance, coordination and functional technique to increase independence with all ADLs and ambulation . Reason for Services/Other Comments:  · Patient continues to require modification of therapeutic interventions to increase complexity of exercises.             TREATMENT:   (In addition to Assessment/Re-Assessment sessions the following treatments were rendered)  Pre-treatment Symptoms/Complaints:pt reports she continues to improve with left knee. She is sleeping better and consistent with her HEP. Pt expresses her willingness for discharge in near future. Will evaluate for discharge next visit. Reports most pain of non surgical knee, mild stiffness of surgical knee. Pain: Initial:    /10 Post Session:  2/10. pain     ROM: 110 flexion     Therapeutic Exercise: ( 45 min):  Exercises per grid below to improve mobility, strength and balance. Required moderate visual, verbal and manual cues to promote proper body alignment, promote proper body posture, promote proper body mechanics and promote proper body breathing techniques. Progressed resistance, range and repetitions as indicated.    Date:  1/22/19 Date:  1/28/19 Date:  2/1/19 2/6/19 2/21/19 2/26/19 2/28/19 3/8/19   Activity/Exercise Parameters Parameters Parameters        Education  POC, HEP,ice application with elevation, sit to stand t/f New HEP      Balance and transfers    nustep NV 10 min level 2   10 min level 2.5 10 min level 2.5  10 min level 3 10 min level 3 X 10 min level 3   SLR Demo do 2 x 5  X 10       X 10    Heel slide Demo do X 5 with op  X 5    With OP x 5 +OP at endrange 5x* With OP at end of flexion  x 10    SAQ  X 20 2# 2 x 10 3#     X 10    Clamshell  2 x 5         LAQ  X 10 B  3# x 20    3# 10\"x10    Ham curl  YTB x 10 B  GTB x 20        Sit to stand    X 10, no UE    10x X 5    Gait    2 x 35 ' In ladder X 150 ft focus on COG 2 x 40 ft     X 4 laps in ladder  *1 lap Hallway x 500 ft with intermittent head rotation and flex/ext    Step tap   X 10 B with use of UE  X 20 B on 6\" step  Anterior and alteral x 10 B  6\" 10Bx2 E forward and lateral.    Side stepping   2 x 35 ' In ladder x 2 laps   In ladder x 4 laps In ladder x 4 laps    Knee flexion and ham stretch combo    Supine hip @ 90 with flex/ext knee         Step ups    X 20 left fwd,  X 20 left lateral 6\" step       Calf stretch     Slant board B 3 x 20 sec        Proprioceptive activities      Static standing on Unstable surface (BF), EO, EC, staggered standing    Static standing on Unstable surface (BF), EO, EC, head movements    HRs       10x        Manual techniques: ---5 min  To reduce joint restriction due to scar adhesions. · Scar mobilization all directions anterior left knee during heel slide activity     MedBridge Portal  Modalities:   · vasopneumatic compression x 10 min, Mod compression, 34 deg left knee to decrease edema and improve joint mobility. (not today)      Treatment/Session Assessment:    · Response to Treatment:  Focused on proprioceptive activities and quad strength to aid in pt weaning from cane. Pt cotnineus to be nervous about her balacne however, left knee is not limiting her mobility and I suspect possible vestibular involvement. Pt demonstrated no LOB during any above exercises but felt very challenged with neck rotations and gait. Mild edema of left knee still present and encouraged ice at home (declined ice and verbalized she will perform at home post-therapy session). · Compliance with Program/Exercises: Will assess as treatment progresses.   · Recommendations/Intent for next treatment session: proprioception activities, ROM focus on flexion, edema reduction, gait-wean from cane     Future Appointments   Date Time Provider Robert Figueroa   3/13/2019 10:15 AM Aamir Douglas, PT [facility]       Total Treatment Duration:  50 min   PT Patient Time In/Time Out  Time In: 1035  Time Out: 501 Luis E Etienne, PT

## 2019-03-13 ENCOUNTER — HOSPITAL ENCOUNTER (OUTPATIENT)
Dept: PHYSICAL THERAPY | Age: 64
End: 2019-03-13
Payer: MEDICARE

## 2019-03-20 ENCOUNTER — HOSPITAL ENCOUNTER (OUTPATIENT)
Dept: PHYSICAL THERAPY | Age: 64
Discharge: HOME | End: 2019-03-20
Payer: MEDICARE

## 2019-03-20 PROCEDURE — 97110 THERAPEUTIC EXERCISES: CPT

## 2019-03-20 NOTE — THERAPY DISCHARGE
Adilia Barrett : 1955 Payor: [facility] [place] / Plan: Nikhil Lopez [place] / Product Type: Managed Care Medicare /  
 [address] [facility] [address] [place] [address], [address] [place] [address], [facility], [address] [place] [address] [phone]   [phone] OUTPATIENT PHYSICAL THERAPY: Discharge Summary  3/20/2019 ICD-10: Treatment Diagnosis: Pain in Left Knee (M25.562) Stiffness of Left Knee, Not elsewhere classified (M25.662) Difficulty in walking, Not elsewhere classified (R26.2) Other abnormalities of gait and mobility (R26.89) 
h  Effusion of left knee Precautions/Allergies:  
Hydrocodone MD Orders: evaluate and treat MEDICAL/REFERRING DIAGNOSIS: 
Presence of left artificial knee joint [Z96.652] DATE OF ONSET: 19 REFERRING PHYSICIAN: Jazlyn Bonds MD 
RETURN PHYSICIAN APPOINTMENT: not specified Final ASSESSMENT:  Ms. Felisa Villarreal has attended 10 scheduled PT visits following left TKA to regain functional mobility. Overall, she has been progressed well with functional ROM and strength and has demonstrated symmetrical and safe gait pattern without assistive device. Pt does report her non surgical knee limited her from many activities due to pain form OA. Pt feels ready for discharge from PT at this time and is committed to continuing her HEP independently. She has met most therapy goals and will be discharged from formal PT. Thank you for this referral, 
Bel Mcdonald PT    
 
  
 
 
GOALS: (Goals have been discussed and agreed upon with patient.) SHORT-TERM FUNCTIONAL GOALS: 
1. Pt will be independent with HEP. (met) 2. Pt will report left knee pain in no longer constant. (Met) 3. Pt will report increased ease of sleep. (met) 4. Pt will ambulate at lease 200 ft on level surface safely without need for assistive device. (met) DISCHARGE GOALS: 
1. Pt will improve LEFS score by at least 10 points. (met) 2. Pt will ambulate stairs with recip gait pattern, ascending and descending no hand rail. (met) 3. Pt will demonstrate improved functional strength by sit to stand transfers 5/5 with no use of UE.(met) 4. Pt will demonstrate left knee flexion ROM to at least 110. (met) 5. Pt mid patellar girth measurement will measure within 1 cm R-L to demonstrate decreased edema. (met) The information in this section was collected on 1/22/19 (except where otherwise noted). HISTORY:  
History of Present Injury/Illness (Reason for Referral): L TKA 12/13/18. Both knees painful for 3-4 years, left worse. Using cane and rolling walker over las 6-7 months. Conservative measures failed leading to surgery. 2 days in hospital 2-3 weeks of home therapy. Present symptoms (on day of initial evaluation): aching and throbbing, tight, swelling left knee, sleep disturbed · Aggravating factors: not taking medication, weather changes, walking, standing, sit to stand transfers, sit to stand · Relieving factors: ice, medication · Pain level: 4/10 present, 6/10 worst, 4/10 best  
 
Past Medical History/Comorbidities: Ms. Vianney Davis  has a past medical history of Asthma, High cholesterol, History of anemia, History of hypokalemia, Hypertension, Morbid obesity ([facility] [address] (09/11/2014), Neuropathy, EMY on CPAP, Osteoarthritis, and Unspecified adverse effect of anesthesia. Ms. Vianney Davis  has a past surgical history that includes hx hernia repair. Social History/Living Environment:  
Lives alone, but family close by, stairs to enter Prior Level of Function/Work/Activity: 
Not currently working, used cane/walker for 6+ months prior to surgery Dominant Side:  
      RIGHT Other Clinical Tests:   
      none Previous Treatment Approaches:   
      Injections, home therapy Ambulatory/Rehab Services H2 Model Falls Risk Assessment Risk Factors: 
     No Risk Factors Identified Ability to Rise from Chair: (1)  Pushes up, successful in one attempt Falls Prevention Plan: No modifications necessary Total: (5 or greater = High Risk): 1 ©2010 [facility] [place] Patent #[id number]. Federal Law prohibits the replication, distribution or use without written permission from [facility] [employer]) Current Medications:   
  
Current Outpatient Medications:  
  promethazine (PHENERGAN) 25 mg tablet, Take 25 mg by mouth every six (6) hours as needed for Nausea., Disp: , Rfl:  
  HYDROmorphone (DILAUDID) 2 mg tablet, Take 1-2 Tabs by mouth every four (4) hours as needed. Max Daily Amount: 24 mg. (Patient taking differently: Take 1-2 Tabs by mouth every four (4) hours as needed for Pain.), Disp: 60 Tab, Rfl: 0 
  triamterene-hydroCHLOROthiazide (DYAZIDE) 37.5-25 mg per capsule, Take 1 Cap by mouth daily. , Disp: , Rfl:  
  albuterol (PROAIR HFA) 90 mcg/actuation inhaler, Take 1 Puff by inhalation every four (4) hours as needed for Wheezing., Disp: , Rfl:   
Date Last Reviewed:  3/20/2019 EXAMINATION: updated 3/20/19 Girth: · Mid patella; 50 cm right,  50 cm left ROM:   
       
LE ROM  DATE 
1/22/19 DATE 
3/20/19 Knee Flexion  R: 90 deg L: 95 deg R: 90 deg L:  110 deg Strength:   
       
Lower quadrant    DATE 
1/22/19 DATE 
3/20/19 Hip flexion R: 4/5 
L: 3+/5 R: 4+ 
L: 4+ Hip Abduction  R: 4/5 L: 4/5 R: 4+ 
L:  4+ Hip Extension  R: 4/5 L: 4/5 R: 4+ 
L: 4+  
Knee Flexion  R: 4+/5 L: 4/5 R: 4+ 
L: 4+  
Knee Extension  R: 4+/5 L: 4/5 R: 4+ 
L: 4+ Ankle Dorsiflexion  R: 4+/5 L:4+/5 R: 4+ 
L: 4+ Ankle Plantarflexion  R:4+/5 L:4+/5 R: 4+ 
L: 4+ Functional Mobility:  
      Gait/Ambulation: symmetrical and safe on level ground with verbal cuing, (pt continues to use cane with most community ambulation for reasons unrelated to knee)       Transfers:moderate 5/5 times without use of UE    
 Stairs:  recip gait ascending and descending use of one hand rail safely Outcome Measure: Tool Used: Lower Extremity Functional Scale (LEFS) Score:  Initial: 18/80 Most Recent: 25/80 (Date: 3/25/19 ) Interpretation of Score: 20 questions each scored on a 5 point scale with 0 representing \"extreme difficulty or unable to perform\" and 4 representing \"no difficulty\". The lower the score, the greater the functional disability. 80/80 represents no disability. Minimal detectable change is 9 points.

## 2019-03-20 NOTE — PROGRESS NOTES
Leonides Pena : 1955 Payor: [facility] [place] / Plan: Deny Felix [facility] MEDICARE HMO/PPO / Product Type: Managed Care Medicare /  
 Francisca Nettles at [facility] [address], [address] [place] [address], [facility], [address] [place] [address] [phone]   [phone] OUTPATIENT PHYSICAL THERAPY:Daily Note  3/20/2019 ICD-10: Treatment Diagnosis: Pain in Left Knee (M25.562) Stiffness of Left Knee, Not elsewhere classified (M25.662) Difficulty in walking, Not elsewhere classified (R26.2) Other abnormalities of gait and mobility (R26.89) 
h  Effusion of left knee Precautions/Allergies:  
Hydrocodone MD Orders: evaluate and treat MEDICAL/REFERRING DIAGNOSIS: 
Presence of left artificial knee joint [Z96.652] DATE OF ONSET: 19 REFERRING PHYSICIAN: Jaclyn Bang MD 
RETURN PHYSICIAN APPOINTMENT: not specified TREATMENT:  
(In addition to Assessment/Re-Assessment sessions the following treatments were rendered) Pre-treatment Symptoms/Complaints:pt reports she continues to improve with left knee. She is sleeping better and consistent with her HEP. Pt expresses her willingness for discharge Pain: Initial:  
 0/10 Post Session:  2/10. pain ROM: 110 flexion Therapeutic Exercise: ( 25 min):  Exercises per grid below to improve mobility, strength and balance. Required moderate visual, verbal and manual cues to promote proper body alignment, promote proper body posture, promote proper body mechanics and promote proper body breathing techniques. Progressed resistance, range and repetitions as indicated. Date: 
19 Date: 
19 Date: 
2/1/19 2/6/19 2/21/19 2/26/19 2/28/19 3/20/19 Activity/Exercise Parameters Parameters Parameters Education  POC, HEP,ice application with elevation, sit to stand t/f New HEP      Balance and transfers   
nustep NV 10 min level 2   10 min level 2.5 10 min level 2.5  10 min level 3 10 min level 3 X 10 min level 3 SLR Demo do 2 x 5  X 10       X 10 Heel slide Demo do X 5 with op  X 5    With OP x 5 +OP at endrange 5x* With OP at end of flexion  x 10 SAQ  X 20 2# 2 x 10 3#     X 10 Clamshell  2 x 5 LAQ  X 10 B  3# x 20    3# 10\"x10 Ham curl  YTB x 10 B  GTB x 20 Sit to stand    X 10, no UE    10x X 5 Gait    2 x 35 ' In ladder X 150 ft focus on COG 2 x 40 ft X 4 laps in ladder  *1 lap Hallway x 500 ft with intermittent head rotation and flex/ext Step tap   X 10 B with use of UE  X 20 B on 6\" step  Anterior and alteral x 10 B  6\" 10Bx2 E forward and lateral.   
Side stepping   2 x 35 ' In ladder x 2 laps   In ladder x 4 laps In ladder x 4 laps Knee flexion and ham stretch combo    Supine hip @ 90 with flex/ext knee Step ups    X 20 left fwd, X 20 left lateral 6\" step    Stairs x 3 laps Calf stretch     Slant board B 3 x 20 sec Proprioceptive activities      Static standing on Unstable surface (BF), EO, EC, staggered standing    Static standing on Unstable surface (BF), EO, EC, head movements HRs       10x Manual techniques: ---5 min  To reduce joint restriction due to scar adhesions. · Scar mobilization all directions anterior left knee during heel slide activity MedBridge Portal 
Modalities:  
· vasopneumatic compression x 10 min, Mod compression, 34 deg left knee to decrease edema and improve joint mobility. (not today) Treatment/Session Assessment:  Good performance of all above. Response to Treatment no increase of pain level Total Treatment Duration:  25 min PT Patient Time In/Time Out Time In: 2724 Time Out: 1923 [facility], PT

## 2019-06-27 NOTE — PERIOP NOTES
Opened chart to review prior to surgery. Checks made to ensure chart/patient is cleared and ready for surgery. Island Pedicle Flap Text: The defect edges were debeveled with a #15 scalpel blade.  Given the location of the defect, shape of the defect and the proximity to free margins an island pedicle advancement flap was deemed most appropriate.  Using a sterile surgical marker, an appropriate advancement flap was drawn incorporating the defect, outlining the appropriate donor tissue and placing the expected incisions within the relaxed skin tension lines where possible.    The area thus outlined was incised deep to adipose tissue with a #15 scalpel blade.  The skin margins were undermined to an appropriate distance in all directions around the primary defect and laterally outward around the island pedicle utilizing iris scissors.  There was minimal undermining beneath the pedicle flap.

## 2022-03-19 PROBLEM — N18.30 CKD (CHRONIC KIDNEY DISEASE) STAGE 3, GFR 30-59 ML/MIN (HCC): Status: ACTIVE | Noted: 2018-11-19

## 2022-03-19 PROBLEM — Z96.652 S/P TKR (TOTAL KNEE REPLACEMENT) USING CEMENT, LEFT: Status: ACTIVE | Noted: 2018-12-14

## 2022-03-19 PROBLEM — M17.12 ARTHRITIS OF KNEE, LEFT: Status: ACTIVE | Noted: 2018-12-13

## 2022-07-16 ENCOUNTER — HOSPITAL ENCOUNTER (EMERGENCY)
Age: 67
Discharge: HOME | End: 2022-07-16
Attending: EMERGENCY MEDICINE
Payer: MEDICARE

## 2022-07-16 ENCOUNTER — APPOINTMENT (OUTPATIENT)
Dept: GENERAL RADIOLOGY | Age: 67
End: 2022-07-16
Payer: MEDICARE

## 2022-07-16 VITALS
OXYGEN SATURATION: 97 % | RESPIRATION RATE: 20 BRPM | WEIGHT: 293 LBS | HEART RATE: 101 BPM | SYSTOLIC BLOOD PRESSURE: 133 MMHG | DIASTOLIC BLOOD PRESSURE: 73 MMHG | HEIGHT: 67 IN | BODY MASS INDEX: 45.99 KG/M2 | TEMPERATURE: 100.8 F

## 2022-07-16 DIAGNOSIS — Z76.0 ENCOUNTER FOR MEDICATION REFILL: ICD-10-CM

## 2022-07-16 DIAGNOSIS — U07.1 COVID-19: Primary | ICD-10-CM

## 2022-07-16 LAB
ANION GAP SERPL CALC-SCNC: 12 MMOL/L (ref 7–16)
BUN SERPL-MCNC: 7 MG/DL (ref 7–18)
CALCIUM SERPL-MCNC: 8.7 MG/DL (ref 8.3–10.4)
CHLORIDE SERPL-SCNC: 102 MMOL/L (ref 98–107)
CO2 SERPL-SCNC: 26 MMOL/L (ref 21–32)
CREAT SERPL-MCNC: 0.81 MG/DL (ref 0.6–1)
ERYTHROCYTE [DISTWIDTH] IN BLOOD BY AUTOMATED COUNT: 12.6 % (ref 11.9–14.6)
GLUCOSE SERPL-MCNC: 101 MG/DL (ref 65–100)
HCT VFR BLD AUTO: 39.9 % (ref 35.8–46.3)
HGB BLD-MCNC: 12.8 G/DL (ref 11.7–15.4)
LACTATE SERPL-SCNC: 1.9 MMOL/L (ref 0.4–2)
MCH RBC QN AUTO: 29.3 PG (ref 26.1–32.9)
MCHC RBC AUTO-ENTMCNC: 32.1 G/DL (ref 31.4–35)
MCV RBC AUTO: 91.3 FL (ref 79.6–97.8)
NRBC # BLD: 0 K/UL (ref 0–0.2)
PLATELET # BLD AUTO: 307 K/UL (ref 150–450)
PMV BLD AUTO: 10.6 FL (ref 9.4–12.3)
POTASSIUM SERPL-SCNC: 5.3 MMOL/L (ref 3.5–5.1)
RBC # BLD AUTO: 4.37 M/UL (ref 4.05–5.2)
SARS-COV-2 RDRP RESP QL NAA+PROBE: DETECTED
SODIUM SERPL-SCNC: 140 MMOL/L (ref 136–145)
SOURCE: ABNORMAL
WBC # BLD AUTO: 8.8 K/UL (ref 4.3–11.1)

## 2022-07-16 PROCEDURE — 6370000000 HC RX 637 (ALT 250 FOR IP): Performed by: EMERGENCY MEDICINE

## 2022-07-16 PROCEDURE — 80048 BASIC METABOLIC PNL TOTAL CA: CPT

## 2022-07-16 PROCEDURE — 85027 COMPLETE CBC AUTOMATED: CPT

## 2022-07-16 PROCEDURE — 71045 X-RAY EXAM CHEST 1 VIEW: CPT

## 2022-07-16 PROCEDURE — 83605 ASSAY OF LACTIC ACID: CPT

## 2022-07-16 PROCEDURE — 99284 EMERGENCY DEPT VISIT MOD MDM: CPT

## 2022-07-16 PROCEDURE — 87040 BLOOD CULTURE FOR BACTERIA: CPT

## 2022-07-16 PROCEDURE — 87635 SARS-COV-2 COVID-19 AMP PRB: CPT

## 2022-07-16 RX ORDER — DEXAMETHASONE SODIUM PHOSPHATE 10 MG/ML
6 INJECTION INTRAMUSCULAR; INTRAVENOUS
Status: DISCONTINUED | OUTPATIENT
Start: 2022-07-16 | End: 2022-07-16

## 2022-07-16 RX ORDER — ALBUTEROL SULFATE 90 UG/1
2 AEROSOL, METERED RESPIRATORY (INHALATION) EVERY 6 HOURS PRN
Qty: 1 EACH | Refills: 8 | Status: SHIPPED | OUTPATIENT
Start: 2022-07-16

## 2022-07-16 RX ORDER — ALBUTEROL SULFATE 2.5 MG/3ML
2.5 SOLUTION RESPIRATORY (INHALATION) EVERY 4 HOURS PRN
Qty: 30 EACH | Refills: 8 | Status: SHIPPED | OUTPATIENT
Start: 2022-07-16

## 2022-07-16 RX ORDER — ACETAMINOPHEN 500 MG
1000 TABLET ORAL
Status: COMPLETED | OUTPATIENT
Start: 2022-07-16 | End: 2022-07-16

## 2022-07-16 RX ORDER — IPRATROPIUM BROMIDE AND ALBUTEROL SULFATE 2.5; .5 MG/3ML; MG/3ML
1 SOLUTION RESPIRATORY (INHALATION)
Status: COMPLETED | OUTPATIENT
Start: 2022-07-16 | End: 2022-07-16

## 2022-07-16 RX ADMIN — IPRATROPIUM BROMIDE AND ALBUTEROL SULFATE 1 AMPULE: .5; 3 SOLUTION RESPIRATORY (INHALATION) at 18:08

## 2022-07-16 RX ADMIN — ACETAMINOPHEN 1000 MG: 500 TABLET, FILM COATED ORAL at 18:06

## 2022-07-16 ASSESSMENT — ENCOUNTER SYMPTOMS
RHINORRHEA: 1
EYE REDNESS: 0
VOMITING: 0
EYE PAIN: 0
COUGH: 1
NAUSEA: 0
SHORTNESS OF BREATH: 1
DIARRHEA: 0
COLOR CHANGE: 0

## 2022-07-16 ASSESSMENT — PAIN - FUNCTIONAL ASSESSMENT
PAIN_FUNCTIONAL_ASSESSMENT: NONE - DENIES PAIN
PAIN_FUNCTIONAL_ASSESSMENT: NONE - DENIES PAIN

## 2022-07-16 NOTE — ED TRIAGE NOTES
Pt states she has had SOB cough since last night has hx of asthma. Tried breathing treatment at home.

## 2022-07-16 NOTE — ED PROVIDER NOTES
[employer] Emergency Department Provider Note                   PCP:                Dakota Randle MD               Age: 74 y.o. Sex: female     No diagnosis found. DISPOSITION          MDM  Number of Diagnoses or Management Options  COVID-19  Encounter for medication refill  Diagnosis management comments: Fever I will do a sepsis evaluation and a COVID swab. Orders Placed This Encounter   Procedures    COVID-19, Rapid    Culture, Blood 1    Culture, Blood 1    XR CHEST PORTABLE    CBC    Basic Metabolic Panel    Lactic Acid        Misti Campuzano is a 74 y.o. female who presents to the Emergency Department with chief complaint of    Chief Complaint   Patient presents with    Shortness of Breath    Cough      17-year-old lady presents with concerns about wheezing and shortness of breath. She says within the last 24 hours she started to get very short of breath especially when she gets up and tries to walk around and she began having some significant wheezing today. She tried a breathing treatment at home 2 or 3 times today without any relief. She said that she has had no nausea, vomiting, or diarrhea. She did not know she had a fever until she got here. No other associated symptoms. Elements of this note were created using speech recognition software. As such, errors of speech recognition may be present. Review of Systems   Constitutional:  Positive for activity change, appetite change and fatigue. Negative for chills and fever. HENT:  Positive for congestion, postnasal drip and rhinorrhea. Eyes:  Negative for pain and redness. Respiratory:  Positive for cough and shortness of breath. Cardiovascular:  Negative for chest pain and palpitations. Gastrointestinal:  Negative for diarrhea, nausea and vomiting. Genitourinary:  Negative for dysuria and hematuria. Musculoskeletal:  Positive for myalgias. Negative for arthralgias.    Skin:  Negative for color change and rash. Neurological:  Positive for headaches. Negative for seizures. Hematological:  Negative for adenopathy. Psychiatric/Behavioral:  Negative for agitation and confusion. Past Medical History:   Diagnosis Date    Asthma     High cholesterol     Hypertension         History reviewed. No pertinent surgical history. History reviewed. No pertinent family history. Social Connections: Not on file        Allergies   Allergen Reactions    Norco [Hydrocodone-Acetaminophen] Nausea And Vomiting        Vitals signs and nursing note reviewed. Patient Vitals for the past 4 hrs:   Temp Pulse Resp BP SpO2   07/16/22 1816 -- -- -- -- 97 %   07/16/22 1801 -- -- -- -- 95 %   07/16/22 1746 -- -- -- (!) 141/83 91 %   07/16/22 1742 -- -- -- (!) 154/74 --   07/16/22 1741 -- -- 26 -- 93 %   07/16/22 1738 (!) 102.1 °F (38.9 °C) (!) 110 -- -- --          Physical Exam  Vitals and nursing note reviewed. Constitutional:       Appearance: Normal appearance. HENT:      Head: Normocephalic and atraumatic. Nose: Nose normal. No congestion. Eyes:      Pupils: Pupils are equal, round, and reactive to light. Cardiovascular:      Comments: Mild tachycardia  Pulmonary:      Comments: Mild scattered wheezes  Abdominal:      General: Bowel sounds are normal.      Palpations: Abdomen is soft. Musculoskeletal:      Right lower leg: No edema. Left lower leg: No edema. Neurological:      General: No focal deficit present. Mental Status: She is alert and oriented to person, place, and time.         Procedures      Labs Reviewed   COVID-19, RAPID - Abnormal; Notable for the following components:       Result Value    SARS-CoV-2, Rapid Detected (*)     All other components within normal limits   BASIC METABOLIC PANEL - Abnormal; Notable for the following components:    Potassium 5.3 (*)     Glucose 101 (*)     All other components within normal limits   CULTURE, BLOOD 1   CULTURE, BLOOD 1   CBC   LACTIC ACID XR CHEST PORTABLE   Final Result   No consolidation. ED Course as of 07/17/22 1440   Sat Jul 16, 2022   1741 On walking from triage to room 3 her O2 sat dropped to about 91% [AC]   1837 I discussed the possibility of needing to admit the patient because of her initial low O2 sat. She said she does not want to be admitted and she wants to go home. We will try a repeat ambulatory O2 sat after her breathing treatment and see how she is doing. [AC]   1854 After the breathing treatment the patient's O2 sats dropped down to 90% just laying in bed. I have placed on 4 L of oxygen. I do not think we need a walking O2 sat at this time. He has agreed to be admitted to the hospital. [AC]   46 68-year-old lady who has been vaccinated against COVID presents with about 24 hours of fevers, chills, body aches, not feeling well. She did not know she had a fever until she checked in here and it was 102.1. She is COVID-positive. She is morbidly obese. I have given her a DuoNeb treatment because she has a history of asthma. Even after the treatment and 6 mg of Decadron her at rest room air sats are about 90 to 91%. I think she will need to come in for further care. [AC]      ED Course User Index  [AC] Noah Alvarado MD        Voice dictation software was used during the making of this note. This software is not perfect and grammatical and other typographical errors may be present. This note has not been completely proofread for errors.        Noah Alvarado MD  07/16/22 [id number]       Noah Alvarado MD  07/17/22 7274

## 2022-07-17 NOTE — ED NOTES
I have reviewed discharge instructions with the patient. The patient verbalized understanding. Patient left ED via Discharge Method: wheelchair to Home with family. Opportunity for questions and clarification provided. Patient given 2 scripts. To continue your aftercare when you leave the hospital, you may receive an automated call from our care team to check in on how you are doing. This is a free service and part of our promise to provide the best care and service to meet your aftercare needs.  If you have questions, or wish to unsubscribe from this service please call [phone]. Thank you for Choosing our [facility] Emergency Department.         Gil Love RN  07/16/22 9052

## 2022-07-17 NOTE — ED PROVIDER NOTES
9:02 PM patient states she is feeling much better and wants to go home. She was able to ambulate around the department with her O2 sat dropping to 91% but quickly recovering to 94% on room air. She had no significant dyspnea, states her breathing is currently at its baseline. She is requesting a refill for her albuterol nebulizer solution as well as an inhaler. She has a primary doctor she can follow-up with.      Roland Tobias MD  07/16/22 6504

## 2022-07-17 NOTE — ED NOTES
Pt walked around ER and upon returning to room SAT was 91-91% on room air. Approx 30 secs after sitting down O2 SAT was at 94% on Room Air.      Cristi Soto RN  07/16/22 2026

## 2022-07-17 NOTE — DISCHARGE INSTRUCTIONS
Follow-up with your doctor next week, call the office to make an appointment. Return to the emergency department if your symptoms worsen.

## 2022-07-18 ENCOUNTER — CARE COORDINATION (OUTPATIENT)
Dept: CARE COORDINATION | Facility: CLINIC | Age: 67
End: 2022-07-18

## 2022-07-18 NOTE — CARE COORDINATION
[facility] Transitions Initial Follow Up Call    Call within 2 business days of discharge: Yes    Patient: Radha Waddell Patient : 1955   MRN: [id number]  Reason for Admission: COVID  Discharge Date: 22 RARS: No data recorded    Last Discharge [facility]       Date Complaint Diagnosis Description Type Department Provider    22 Shortness of Breath; Cough COVID-19 . .. ED (DISCHARGE) [facility] Cameron Bustos MD; [place]. .. Patient contacted regarding COVID-19 risk, exposure, diagnosis, pulse oximeter ordered at discharge, and monoclonal antibody infusion follow up. Discussed COVID-19 related testing which was available at this time. Test results were positive. Patient informed of results, if available? Yes. LPN Care Coordinator contacted the patient by telephone to perform post discharge assessment. Call within 2 business days of discharge: Yes. Verified name and  with patient as identifiers. Provided introduction to self, and explanation of the CTN/ACM role, and reason for call due to risk factors for infection and/or exposure to COVID-19. Symptoms reviewed with patient who verbalized the following symptoms: shortness of breath. Due to no new or worsening symptoms encounter was not routed to provider for escalation. Discussed follow-up appointments. If no appointment was previously scheduled, appointment scheduling offered: Yes. [facility] follow up appointment(s): No future appointments. Non-[facility] follow up appointment(s): Spoke with patient states doing fine. Patient denies any increase shortness of breath    Non-face-to-face services provided:  Obtained and reviewed discharge summary and/or continuity of care documents     Advance Care Planning:   Does patient have an Advance Directive:  decision maker updated. Educated patient about risk for severe COVID-19 due to risk factors according to CDC guidelines.  LPN CC reviewed discharge instructions, medical action plan and red flag symptoms with the patient who verbalized understanding. Discussed COVID vaccination status: Yes. Education provided on COVID-19 vaccination as appropriate. Discussed exposure protocols and quarantine with CDC Guidelines. Patient was given an opportunity to verbalize any questions and concerns and agrees to contact LPN CC or health care provider for questions related to their healthcare. Reviewed and educated patient on any new and changed medications related to discharge diagnosis     Was patient discharged with a pulse oximeter? no      LPN CC provided contact information. Plan for follow-up call in 5-7 days based on severity of symptoms and risk factors. Non-face-to-face services provided:  Obtained and reviewed discharge summary and/or continuity of care documents    Care Transitions 24 Hour Call    Care Transitions Interventions         Follow Up  No future appointments.     Terrie Carrillo LPN

## 2022-07-21 LAB
BACTERIA SPEC CULT: NORMAL
BACTERIA SPEC CULT: NORMAL
SERVICE CMNT-IMP: NORMAL
SERVICE CMNT-IMP: NORMAL

## 2022-07-26 ENCOUNTER — CARE COORDINATION (OUTPATIENT)
Dept: CARE COORDINATION | Facility: CLINIC | Age: 67
End: 2022-07-26

## 2022-07-26 NOTE — CARE COORDINATION
[facility] 45 Transitions Follow Up Call    2022    Patient: Licha Bennett  Patient : 1955   MRN: [id number]  Reason for Admission: COVID  Discharge Date: 22 RARS: No data recorded       You Patient resolved from the Care Transitions episode on 2022  Discussed COVID-19 related testing which was available at this time. Test results were positive. Patient informed of results, if available? Yes    Patient/family has been provided the following resources and education related to COVID-19:                         Signs, symptoms and red flags related to COVID-19            CDC exposure and quarantine guidelines            [employer] exposure contact - [phone]            Contact for their local Department of Health                 Patient currently reports that the following symptoms have improved:  shortness of breath and no new/worsening symptoms     No further outreach scheduled with this LPN. Episode of Care resolved. Patient has this LPN contact information if future needs arise. Care Transitions Subsequent and Final Call    Subsequent and Final Calls  Care Transitions Interventions  Other Interventions: Follow Up  No future appointments.     Elizabeth Pelletier LPN

## 2025-04-24 ENCOUNTER — APPOINTMENT (OUTPATIENT)
Dept: CT IMAGING | Age: 70
End: 2025-04-24
Payer: MEDICARE

## 2025-04-24 ENCOUNTER — APPOINTMENT (OUTPATIENT)
Dept: GENERAL RADIOLOGY | Age: 70
End: 2025-04-24
Payer: MEDICARE

## 2025-04-24 ENCOUNTER — HOSPITAL ENCOUNTER (EMERGENCY)
Age: 70
Discharge: ANOTHER HOSPITAL | End: 2025-04-24
Attending: EMERGENCY MEDICINE
Payer: MEDICARE

## 2025-04-24 ENCOUNTER — HOSPITAL ENCOUNTER (OUTPATIENT)
Age: 70
Setting detail: OBSERVATION
Discharge: HOME | End: 2025-04-25
Attending: STUDENT IN AN ORGANIZED HEALTH CARE EDUCATION/TRAINING PROGRAM | Admitting: INTERNAL MEDICINE
Payer: MEDICARE

## 2025-04-24 VITALS
TEMPERATURE: 97.7 F | HEART RATE: 70 BPM | DIASTOLIC BLOOD PRESSURE: 66 MMHG | SYSTOLIC BLOOD PRESSURE: 120 MMHG | BODY MASS INDEX: 40.65 KG/M2 | OXYGEN SATURATION: 95 % | RESPIRATION RATE: 18 BRPM | HEIGHT: 67 IN | WEIGHT: 259 LBS

## 2025-04-24 DIAGNOSIS — R51.9 ACUTE NONINTRACTABLE HEADACHE, UNSPECIFIED HEADACHE TYPE: Primary | ICD-10-CM

## 2025-04-24 DIAGNOSIS — R51.9 NONINTRACTABLE EPISODIC HEADACHE, UNSPECIFIED HEADACHE TYPE: ICD-10-CM

## 2025-04-24 DIAGNOSIS — J45.41 MODERATE PERSISTENT ASTHMA WITH EXACERBATION: ICD-10-CM

## 2025-04-24 DIAGNOSIS — R09.02 HYPOXEMIA: ICD-10-CM

## 2025-04-24 DIAGNOSIS — J45.41 MODERATE PERSISTENT EXTRINSIC ASTHMA WITH ACUTE EXACERBATION: Primary | ICD-10-CM

## 2025-04-24 DIAGNOSIS — R53.83 OTHER FATIGUE: ICD-10-CM

## 2025-04-24 PROBLEM — E66.01 MORBID OBESITY WITH BODY MASS INDEX (BMI) OF 40.0 OR HIGHER (HCC): Status: ACTIVE | Noted: 2025-04-24

## 2025-04-24 PROBLEM — N18.31 STAGE 3A CHRONIC KIDNEY DISEASE (HCC): Chronic | Status: ACTIVE | Noted: 2018-11-19

## 2025-04-24 PROBLEM — J45.901 ASTHMA WITH ACUTE EXACERBATION: Status: ACTIVE | Noted: 2025-04-24

## 2025-04-24 PROBLEM — E66.01 MORBID OBESITY WITH BODY MASS INDEX (BMI) OF 40.0 OR HIGHER (HCC): Chronic | Status: ACTIVE | Noted: 2025-04-24

## 2025-04-24 PROBLEM — E87.5 HYPERKALEMIA: Status: ACTIVE | Noted: 2025-04-24

## 2025-04-24 PROBLEM — J45.901 ACUTE ASTHMA EXACERBATION: Status: ACTIVE | Noted: 2025-04-24

## 2025-04-24 PROBLEM — N18.31 STAGE 3A CHRONIC KIDNEY DISEASE (HCC): Status: ACTIVE | Noted: 2018-11-19

## 2025-04-24 LAB
ALBUMIN SERPL-MCNC: 3.9 G/DL (ref 3.2–4.6)
ALBUMIN/GLOB SERPL: 1.3 (ref 1–1.9)
ALP SERPL-CCNC: 55 U/L (ref 35–104)
ALT SERPL-CCNC: <5 U/L (ref 12–65)
ANION GAP SERPL CALC-SCNC: 7 MMOL/L (ref 7–16)
AST SERPL-CCNC: 22 U/L (ref 15–37)
BASOPHILS # BLD: 0.02 K/UL (ref 0–0.2)
BASOPHILS NFR BLD: 0.4 % (ref 0–2)
BILIRUB SERPL-MCNC: 0.5 MG/DL (ref 0–1.2)
BUN SERPL-MCNC: 7 MG/DL (ref 8–23)
CALCIUM SERPL-MCNC: 9.4 MG/DL (ref 8.8–10.2)
CHLORIDE SERPL-SCNC: 107 MMOL/L (ref 98–107)
CO2 SERPL-SCNC: 28 MMOL/L (ref 20–29)
CREAT SERPL-MCNC: 0.85 MG/DL (ref 0.8–1.3)
CRP SERPL-MCNC: <0.3 MG/DL (ref 0–0.9)
D DIMER PPP FEU-MCNC: <0.27 UG/ML(FEU)
DIFFERENTIAL METHOD BLD: ABNORMAL
EKG ATRIAL RATE: 57 BPM
EKG DIAGNOSIS: NORMAL
EKG P AXIS: 53 DEGREES
EKG P-R INTERVAL: 150 MS
EKG Q-T INTERVAL: 477 MS
EKG QRS DURATION: 126 MS
EKG QTC CALCULATION (BAZETT): 469 MS
EKG R AXIS: -63 DEGREES
EKG T AXIS: 6 DEGREES
EKG VENTRICULAR RATE: 58 BPM
EOSINOPHIL # BLD: 0.16 K/UL (ref 0–0.8)
EOSINOPHIL NFR BLD: 3.3 % (ref 0.5–7.8)
ERYTHROCYTE [DISTWIDTH] IN BLOOD BY AUTOMATED COUNT: 13.4 % (ref 11.9–14.6)
ERYTHROCYTE [SEDIMENTATION RATE] IN BLOOD: 6 MM/HR (ref 0–30)
FLUAV RNA SPEC QL NAA+PROBE: NOT DETECTED
FLUBV RNA SPEC QL NAA+PROBE: NOT DETECTED
GLOBULIN SER CALC-MCNC: 3 G/DL (ref 2.3–3.5)
GLUCOSE SERPL-MCNC: 92 MG/DL (ref 65–100)
HCT VFR BLD AUTO: 42.2 % (ref 35.8–46.3)
HGB BLD-MCNC: 13.2 G/DL (ref 11.7–15.4)
IMM GRANULOCYTES # BLD AUTO: 0.03 K/UL (ref 0–0.5)
IMM GRANULOCYTES NFR BLD AUTO: 0.6 % (ref 0–5)
LACTATE SERPL-SCNC: 0.8 MMOL/L (ref 0.5–2)
LYMPHOCYTES # BLD: 1.71 K/UL (ref 0.5–4.6)
LYMPHOCYTES NFR BLD: 35.3 % (ref 13–44)
MCH RBC QN AUTO: 28.1 PG (ref 26.1–32.9)
MCHC RBC AUTO-ENTMCNC: 31.3 G/DL (ref 31.4–35)
MCV RBC AUTO: 90 FL (ref 82–102)
MONOCYTES # BLD: 0.52 K/UL (ref 0.1–1.3)
MONOCYTES NFR BLD: 10.7 % (ref 4–12)
NEUTS SEG # BLD: 2.41 K/UL (ref 1.7–8.2)
NEUTS SEG NFR BLD: 49.7 % (ref 43–78)
NRBC # BLD: 0 K/UL (ref 0–0.2)
PLATELET # BLD AUTO: 292 K/UL (ref 150–450)
PMV BLD AUTO: 9.9 FL (ref 9.4–12.3)
POTASSIUM SERPL-SCNC: 4.5 MMOL/L (ref 3.5–5.1)
POTASSIUM SERPL-SCNC: 5.3 MMOL/L (ref 3.5–5.1)
PROCALCITONIN SERPL-MCNC: 0.04 NG/ML (ref 0–0.49)
PROT SERPL-MCNC: 6.9 G/DL (ref 6.3–8.2)
RBC # BLD AUTO: 4.69 M/UL (ref 4.05–5.2)
SARS-COV-2 RDRP RESP QL NAA+PROBE: NOT DETECTED
SODIUM SERPL-SCNC: 142 MMOL/L (ref 133–143)
SOURCE: NORMAL
WBC # BLD AUTO: 4.9 K/UL (ref 4.3–11.1)

## 2025-04-24 PROCEDURE — 6360000002 HC RX W HCPCS: Performed by: EMERGENCY MEDICINE

## 2025-04-24 PROCEDURE — 93010 ELECTROCARDIOGRAM REPORT: CPT | Performed by: INTERNAL MEDICINE

## 2025-04-24 PROCEDURE — 83605 ASSAY OF LACTIC ACID: CPT

## 2025-04-24 PROCEDURE — 6360000002 HC RX W HCPCS: Performed by: INTERNAL MEDICINE

## 2025-04-24 PROCEDURE — 96372 THER/PROPH/DIAG INJ SC/IM: CPT

## 2025-04-24 PROCEDURE — 93005 ELECTROCARDIOGRAM TRACING: CPT | Performed by: EMERGENCY MEDICINE

## 2025-04-24 PROCEDURE — 2700000000 HC OXYGEN THERAPY PER DAY

## 2025-04-24 PROCEDURE — 85379 FIBRIN DEGRADATION QUANT: CPT

## 2025-04-24 PROCEDURE — 36415 COLL VENOUS BLD VENIPUNCTURE: CPT

## 2025-04-24 PROCEDURE — 94640 AIRWAY INHALATION TREATMENT: CPT

## 2025-04-24 PROCEDURE — 85025 COMPLETE CBC W/AUTO DIFF WBC: CPT

## 2025-04-24 PROCEDURE — 94761 N-INVAS EAR/PLS OXIMETRY MLT: CPT

## 2025-04-24 PROCEDURE — 96375 TX/PRO/DX INJ NEW DRUG ADDON: CPT

## 2025-04-24 PROCEDURE — G0378 HOSPITAL OBSERVATION PER HR: HCPCS

## 2025-04-24 PROCEDURE — 84145 PROCALCITONIN (PCT): CPT

## 2025-04-24 PROCEDURE — 71045 X-RAY EXAM CHEST 1 VIEW: CPT

## 2025-04-24 PROCEDURE — 85652 RBC SED RATE AUTOMATED: CPT

## 2025-04-24 PROCEDURE — 96374 THER/PROPH/DIAG INJ IV PUSH: CPT

## 2025-04-24 PROCEDURE — 2500000003 HC RX 250 WO HCPCS: Performed by: EMERGENCY MEDICINE

## 2025-04-24 PROCEDURE — 2500000003 HC RX 250 WO HCPCS: Performed by: INTERNAL MEDICINE

## 2025-04-24 PROCEDURE — 70450 CT HEAD/BRAIN W/O DYE: CPT

## 2025-04-24 PROCEDURE — 86140 C-REACTIVE PROTEIN: CPT

## 2025-04-24 PROCEDURE — 87636 SARSCOV2 & INF A&B AMP PRB: CPT

## 2025-04-24 PROCEDURE — 84132 ASSAY OF SERUM POTASSIUM: CPT

## 2025-04-24 PROCEDURE — 6370000000 HC RX 637 (ALT 250 FOR IP): Performed by: INTERNAL MEDICINE

## 2025-04-24 PROCEDURE — 99285 EMERGENCY DEPT VISIT HI MDM: CPT

## 2025-04-24 PROCEDURE — 80053 COMPREHEN METABOLIC PANEL: CPT

## 2025-04-24 PROCEDURE — G0379 DIRECT REFER HOSPITAL OBSERV: HCPCS

## 2025-04-24 RX ORDER — ONDANSETRON 2 MG/ML
4 INJECTION INTRAMUSCULAR; INTRAVENOUS EVERY 6 HOURS PRN
Status: DISCONTINUED | OUTPATIENT
Start: 2025-04-24 | End: 2025-04-25 | Stop reason: HOSPADM

## 2025-04-24 RX ORDER — OXYCODONE HYDROCHLORIDE 5 MG/1
5 TABLET ORAL EVERY 4 HOURS PRN
Refills: 0 | Status: DISCONTINUED | OUTPATIENT
Start: 2025-04-24 | End: 2025-04-25 | Stop reason: HOSPADM

## 2025-04-24 RX ORDER — ALBUTEROL SULFATE 0.83 MG/ML
2.5 SOLUTION RESPIRATORY (INHALATION)
Status: COMPLETED | OUTPATIENT
Start: 2025-04-24 | End: 2025-04-24

## 2025-04-24 RX ORDER — SODIUM CHLORIDE 0.9 % (FLUSH) 0.9 %
5-40 SYRINGE (ML) INJECTION PRN
Status: DISCONTINUED | OUTPATIENT
Start: 2025-04-24 | End: 2025-04-25 | Stop reason: HOSPADM

## 2025-04-24 RX ORDER — ACETAMINOPHEN 325 MG/1
650 TABLET ORAL EVERY 6 HOURS PRN
Status: DISCONTINUED | OUTPATIENT
Start: 2025-04-24 | End: 2025-04-25 | Stop reason: HOSPADM

## 2025-04-24 RX ORDER — IBUPROFEN 800 MG/1
400 TABLET, FILM COATED ORAL
Status: DISCONTINUED | OUTPATIENT
Start: 2025-04-24 | End: 2025-04-24

## 2025-04-24 RX ORDER — SODIUM CHLORIDE 9 MG/ML
INJECTION, SOLUTION INTRAVENOUS PRN
Status: DISCONTINUED | OUTPATIENT
Start: 2025-04-24 | End: 2025-04-25 | Stop reason: HOSPADM

## 2025-04-24 RX ORDER — POTASSIUM CHLORIDE 1500 MG/1
40 TABLET, EXTENDED RELEASE ORAL PRN
Status: DISCONTINUED | OUTPATIENT
Start: 2025-04-24 | End: 2025-04-25 | Stop reason: HOSPADM

## 2025-04-24 RX ORDER — MAGNESIUM SULFATE IN WATER 40 MG/ML
2000 INJECTION, SOLUTION INTRAVENOUS PRN
Status: DISCONTINUED | OUTPATIENT
Start: 2025-04-24 | End: 2025-04-25 | Stop reason: HOSPADM

## 2025-04-24 RX ORDER — MAGNESIUM HYDROXIDE/ALUMINUM HYDROXICE/SIMETHICONE 120; 1200; 1200 MG/30ML; MG/30ML; MG/30ML
30 SUSPENSION ORAL EVERY 6 HOURS PRN
Status: DISCONTINUED | OUTPATIENT
Start: 2025-04-24 | End: 2025-04-25 | Stop reason: HOSPADM

## 2025-04-24 RX ORDER — POTASSIUM CHLORIDE 7.45 MG/ML
10 INJECTION INTRAVENOUS PRN
Status: DISCONTINUED | OUTPATIENT
Start: 2025-04-24 | End: 2025-04-25 | Stop reason: HOSPADM

## 2025-04-24 RX ORDER — FAMOTIDINE 20 MG/1
10 TABLET, FILM COATED ORAL DAILY PRN
Status: DISCONTINUED | OUTPATIENT
Start: 2025-04-24 | End: 2025-04-25 | Stop reason: HOSPADM

## 2025-04-24 RX ORDER — LABETALOL HYDROCHLORIDE 5 MG/ML
10 INJECTION, SOLUTION INTRAVENOUS
Status: COMPLETED | OUTPATIENT
Start: 2025-04-24 | End: 2025-04-24

## 2025-04-24 RX ORDER — HYDRALAZINE HYDROCHLORIDE 20 MG/ML
20 INJECTION INTRAMUSCULAR; INTRAVENOUS EVERY 4 HOURS PRN
Status: DISCONTINUED | OUTPATIENT
Start: 2025-04-24 | End: 2025-04-25 | Stop reason: HOSPADM

## 2025-04-24 RX ORDER — CLONIDINE HYDROCHLORIDE 0.1 MG/1
0.1 TABLET ORAL EVERY 4 HOURS PRN
Status: DISCONTINUED | OUTPATIENT
Start: 2025-04-24 | End: 2025-04-25 | Stop reason: HOSPADM

## 2025-04-24 RX ORDER — PREDNISONE 20 MG/1
40 TABLET ORAL DAILY
Status: DISCONTINUED | OUTPATIENT
Start: 2025-04-24 | End: 2025-04-25

## 2025-04-24 RX ORDER — GUAIFENESIN/DEXTROMETHORPHAN 100-10MG/5
10 SYRUP ORAL EVERY 4 HOURS PRN
Status: DISCONTINUED | OUTPATIENT
Start: 2025-04-24 | End: 2025-04-25 | Stop reason: HOSPADM

## 2025-04-24 RX ORDER — SODIUM CHLORIDE 0.9 % (FLUSH) 0.9 %
5-40 SYRINGE (ML) INJECTION EVERY 12 HOURS SCHEDULED
Status: DISCONTINUED | OUTPATIENT
Start: 2025-04-24 | End: 2025-04-25 | Stop reason: HOSPADM

## 2025-04-24 RX ORDER — ENOXAPARIN SODIUM 100 MG/ML
30 INJECTION SUBCUTANEOUS 2 TIMES DAILY
Status: DISCONTINUED | OUTPATIENT
Start: 2025-04-24 | End: 2025-04-25 | Stop reason: HOSPADM

## 2025-04-24 RX ORDER — ONDANSETRON 4 MG/1
4 TABLET, ORALLY DISINTEGRATING ORAL EVERY 8 HOURS PRN
Status: DISCONTINUED | OUTPATIENT
Start: 2025-04-24 | End: 2025-04-25 | Stop reason: HOSPADM

## 2025-04-24 RX ORDER — IPRATROPIUM BROMIDE AND ALBUTEROL SULFATE 2.5; .5 MG/3ML; MG/3ML
1 SOLUTION RESPIRATORY (INHALATION)
Status: DISCONTINUED | OUTPATIENT
Start: 2025-04-24 | End: 2025-04-25 | Stop reason: HOSPADM

## 2025-04-24 RX ORDER — BISACODYL 10 MG
10 SUPPOSITORY, RECTAL RECTAL DAILY PRN
Status: DISCONTINUED | OUTPATIENT
Start: 2025-04-24 | End: 2025-04-25 | Stop reason: HOSPADM

## 2025-04-24 RX ORDER — MORPHINE SULFATE 4 MG/ML
4 INJECTION, SOLUTION INTRAMUSCULAR; INTRAVENOUS
Refills: 0 | Status: COMPLETED | OUTPATIENT
Start: 2025-04-24 | End: 2025-04-24

## 2025-04-24 RX ORDER — POTASSIUM CHLORIDE 7.45 MG/ML
10 INJECTION INTRAVENOUS PRN
Status: DISCONTINUED | OUTPATIENT
Start: 2025-04-24 | End: 2025-04-24 | Stop reason: SDUPTHER

## 2025-04-24 RX ORDER — BUTALBITAL, ACETAMINOPHEN AND CAFFEINE 50; 325; 40 MG/1; MG/1; MG/1
1 TABLET ORAL EVERY 4 HOURS PRN
Qty: 20 TABLET | Refills: 0 | Status: SHIPPED | OUTPATIENT
Start: 2025-04-24 | End: 2025-04-24

## 2025-04-24 RX ORDER — LISINOPRIL 5 MG/1
5 TABLET ORAL DAILY
COMMUNITY

## 2025-04-24 RX ORDER — KETOROLAC TROMETHAMINE 15 MG/ML
15 INJECTION, SOLUTION INTRAMUSCULAR; INTRAVENOUS
Status: COMPLETED | OUTPATIENT
Start: 2025-04-24 | End: 2025-04-24

## 2025-04-24 RX ORDER — ONDANSETRON 2 MG/ML
4 INJECTION INTRAMUSCULAR; INTRAVENOUS
Status: COMPLETED | OUTPATIENT
Start: 2025-04-24 | End: 2025-04-24

## 2025-04-24 RX ORDER — ACETAMINOPHEN 500 MG
1000 TABLET ORAL EVERY 8 HOURS SCHEDULED
Status: DISCONTINUED | OUTPATIENT
Start: 2025-04-24 | End: 2025-04-25

## 2025-04-24 RX ORDER — POLYETHYLENE GLYCOL 3350 17 G/17G
17 POWDER, FOR SOLUTION ORAL DAILY PRN
Status: DISCONTINUED | OUTPATIENT
Start: 2025-04-24 | End: 2025-04-25 | Stop reason: HOSPADM

## 2025-04-24 RX ORDER — ACETAMINOPHEN 650 MG/1
650 SUPPOSITORY RECTAL EVERY 6 HOURS PRN
Status: DISCONTINUED | OUTPATIENT
Start: 2025-04-24 | End: 2025-04-25 | Stop reason: HOSPADM

## 2025-04-24 RX ADMIN — LABETALOL HYDROCHLORIDE 10 MG: 5 INJECTION, SOLUTION INTRAVENOUS at 06:41

## 2025-04-24 RX ADMIN — IPRATROPIUM BROMIDE AND ALBUTEROL SULFATE 1 DOSE: 2.5; .5 SOLUTION RESPIRATORY (INHALATION) at 21:31

## 2025-04-24 RX ADMIN — MORPHINE SULFATE 4 MG: 4 INJECTION INTRAVENOUS at 06:43

## 2025-04-24 RX ADMIN — ENOXAPARIN SODIUM 30 MG: 100 INJECTION SUBCUTANEOUS at 12:55

## 2025-04-24 RX ADMIN — IPRATROPIUM BROMIDE AND ALBUTEROL SULFATE 1 DOSE: 2.5; .5 SOLUTION RESPIRATORY (INHALATION) at 16:23

## 2025-04-24 RX ADMIN — ACETAMINOPHEN 1000 MG: 500 TABLET, FILM COATED ORAL at 15:26

## 2025-04-24 RX ADMIN — IBUPROFEN 400 MG: 800 TABLET, FILM COATED ORAL at 12:55

## 2025-04-24 RX ADMIN — ALBUTEROL SULFATE 2.5 MG: 2.5 SOLUTION RESPIRATORY (INHALATION) at 06:46

## 2025-04-24 RX ADMIN — ACETAMINOPHEN 1000 MG: 500 TABLET, FILM COATED ORAL at 20:51

## 2025-04-24 RX ADMIN — SODIUM CHLORIDE, PRESERVATIVE FREE 10 ML: 5 INJECTION INTRAVENOUS at 20:50

## 2025-04-24 RX ADMIN — WATER 125 MG: 1 INJECTION INTRAMUSCULAR; INTRAVENOUS; SUBCUTANEOUS at 09:02

## 2025-04-24 RX ADMIN — PREDNISONE 40 MG: 20 TABLET ORAL at 12:55

## 2025-04-24 RX ADMIN — ONDANSETRON 4 MG: 2 INJECTION, SOLUTION INTRAMUSCULAR; INTRAVENOUS at 06:43

## 2025-04-24 RX ADMIN — KETOROLAC TROMETHAMINE 15 MG: 15 INJECTION, SOLUTION INTRAMUSCULAR; INTRAVENOUS at 10:08

## 2025-04-24 ASSESSMENT — PAIN SCALES - GENERAL
PAINLEVEL_OUTOF10: 9
PAINLEVEL_OUTOF10: 5
PAINLEVEL_OUTOF10: 8
PAINLEVEL_OUTOF10: 7

## 2025-04-24 ASSESSMENT — PAIN - FUNCTIONAL ASSESSMENT
PAIN_FUNCTIONAL_ASSESSMENT: 0-10
PAIN_FUNCTIONAL_ASSESSMENT: 0-10

## 2025-04-24 ASSESSMENT — PAIN DESCRIPTION - LOCATION
LOCATION: HEAD
LOCATION: HEAD

## 2025-04-24 ASSESSMENT — VISUAL ACUITY: OU: 1

## 2025-04-24 ASSESSMENT — TONOMETRY
OD_IOP_MMHG: 14
IOP_HANDHELD: 1

## 2025-04-24 NOTE — ED NOTES
TRANSFER - OUT REPORT:    Verbal report given to Shelley VELAZQUEZ on Nita Chen  being transferred to [facility] for routine progression of patient care       Report consisted of patient's Situation, Background, Assessment and   Recommendations(SBAR).     Information from the following report(s) Nurse Handoff Report, ED Encounter Summary, and ED SBAR was reviewed with the receiving nurse.    Lines:   Peripheral IV Distal;Right Cephalic (Active)        Opportunity for questions and clarification was provided.      Patient transported with:  O2 @ 1lpm

## 2025-04-24 NOTE — PROGRESS NOTES
.TRANSFER - IN REPORT:    Verbal report received from Sara Chen  being received from [facility] for routine progression of patient care      Report consisted of patient's Situation, Background, Assessment and   Recommendations(SBAR).     Information from the following report(s) Nurse Handoff Report was reviewed with the receiving nurse.    Opportunity for questions and clarification was provided.      Assessment completed upon patient's arrival to unit and care assumed.

## 2025-04-24 NOTE — H&P
Hospitalist History and Physical   Admit Date:  2025 11:38 AM   Name:  Nita Chen   Age:  69 y.o.  Sex:  female  :  1955   MRN:  [id number]   Room:  [facility]/    Presenting/Chief Complaint: No chief complaint on file.     Reason(s) for Admission: Acute asthma exacerbation [J45.901]     History of Present Illness:   Nita Chen is a 69 y.o. female with medical history of asthma, hypertension, who presented with headache and shortness of breath.  Patient reports that she had concerns of a sinus infection a week or 2 ago but that improved.  About 4 days ago she developed a headache, mostly constant at the temples, not associated with vision changes or vertigo or lightheadedness.  She was seen by PCP who gave her Imitrex which did not help.  She reports significant allergies and asthma this time a year and been using her albuterol inhaler without relief.  She says she has been outside playing with her grandkids.  she does not take any steroids inhaled or otherwise.  She says she tried Singulair in the past but this did not help. She came to the ER for evaluation.  She was wheezing on exam and had hypoxia with ambulation.  She was given steroids with symptomatic improvement but given her ambulatory hypoxia hospitalist called for admission.      Assessment & Plan:       Acute asthma exacerbation  -give prednisone  -duonebs  -Check D-dimer  -will repeat walk test tomorrow      Headache  - Scheduled Tylenol  - Allergen avoidance  - Oxycodone as needed      Hypertension  - Hold home lisinopril for now      Morbid obesity with body mass index (BMI) of 40.0 or higher ([facility])  -Complicates respiratory status      Hyperkalemia  -Repeat potassium now  -recheck BMP in AM      PT/OT evals ordered?  Not ordered; patient not expected to need rehab  Diet: ADULT DIET; Regular  VTE prophylaxis: Lovenox  Code status: Full Code        Non-peripheral Lines and Tubes (if present):             Hospital  Absolute 0.52 0.10 - 1.30 K/UL    Eosinophils Absolute 0.16 0.00 - 0.80 K/UL    Basophils Absolute 0.02 0.00 - 0.20 K/UL    Immature Granulocytes Absolute 0.03 0.0 - 0.5 K/UL   CMP    Collection Time: 04/24/25  6:37 AM   Result Value Ref Range    Sodium 142 133 - 143 mmol/L    Potassium 5.3 (H) 3.5 - 5.1 mmol/L    Chloride 107 98 - 107 mmol/L    CO2 28 20 - 29 mmol/L    Anion Gap 7 7 - 16 mmol/L    Glucose 92 65 - 100 mg/dL    BUN 7 (L) 8 - 23 MG/DL    Creatinine 0.85 0.80 - 1.30 MG/DL    Est, Glom Filt Rate 73 >60 ml/min/1.73m2    Calcium 9.4 8.8 - 10.2 MG/DL    Total Bilirubin 0.5 0.0 - 1.2 MG/DL    ALT <5 (L) 12 - 65 U/L    AST 22 15 - 37 U/L    Alk Phosphatase 55 35 - 104 U/L    Total Protein 6.9 6.3 - 8.2 g/dL    Albumin 3.9 3.2 - 4.6 g/dL    Globulin 3.0 2.3 - 3.5 g/dL    Albumin/Globulin Ratio 1.3 1.0 - 1.9     Sedimentation Rate    Collection Time: 04/24/25  6:37 AM   Result Value Ref Range    Sed Rate, Automated 6 0 - 30 mm/hr   C-Reactive Protein    Collection Time: 04/24/25  6:37 AM   Result Value Ref Range    CRP <0.3 0.0 - 0.9 mg/dL   Procalcitonin    Collection Time: 04/24/25  6:37 AM   Result Value Ref Range    Procalcitonin 0.04 0.00 - 0.49 ng/mL   COVID-19 & Influenza Combo    Collection Time: 04/24/25  9:02 AM    Specimen: Swab   Result Value Ref Range    Source NASAL      SARS-CoV-2, Rapid Not detected NOTD      Influenza A, JOLANTA Not detected NOTD      Influenza B, JOLANTA Not detected NOTD     Lactic Acid    Collection Time: 04/24/25  9:02 AM   Result Value Ref Range    Lactic Acid 0.8 0.5 - 2.0 mmol/L   EKG 12 Lead    Collection Time: 04/24/25  9:09 AM   Result Value Ref Range    Ventricular Rate 58 BPM    Atrial Rate 57 BPM    P-R Interval 150 ms    QRS Duration 126 ms    Q-T Interval 477 ms    QTc Calculation (Bazett) 469 ms    P Axis 53 degrees    R Axis -63 degrees    T Axis 6 degrees    Diagnosis       Sinus rhythm  Right bundle branch block      Confirmed by DOUGLAS AARON (), LISA CAMPOS

## 2025-04-24 NOTE — ED PROVIDER NOTES
Emergency Department Provider Signout / Continuation of Care Note         DISPOSITION Decision To Discharge 04/24/2025 07:48:24 AM       ICD-10-CM    1. Acute nonintractable headache, unspecified headache type  R51.9           The patient's care was signed out to me at shift change.      Final Plan      Patient was left to me to follow-up on CT scan and blood work.  No acute on CT of her head.  No acute on blood work.  Patient is feeling better after medication here.  Attempted discharge home but patient was hypoxic and weak and fatigued with ambulation.  Unable to ambulate out of the room.  I thought maybe due to the morphine she received.  We allowed her to rest and work her up further with chest x-ray and COVID and flu swab.  No infiltrate.  Negative swab.  Gave steroids.  Patient has no wheezing but remains slightly short of breath and hypoxic 86% on room air.  With continued hypoxia we will transfer and admitted for further evaluation and treatment.  Patient's headache is coming back.  Will treat.       1 or more acute illnesses that pose a threat to life or bodily function.   Prescription drug management performed.  Patient was discharged risks and benefits of hospitalization were considered.  Shared medical decision making was utilized in creating the patients health plan today.     EKG interpreted by me.  Normal sinus rhythm rate 58.  Nonspecific ST changes.      I interpreted the CT Scan no hemorrhage.                  Austin Johnson III, MD  04/24/25 0966       Austin Johnson III, MD  04/24/25 4135

## 2025-04-24 NOTE — ACP (ADVANCE CARE PLANNING)
Advance Care Planning   General Advance Care Planning (ACP) Conversation    Date of Conversation: 4/24/2025  Conducted with: Patient with Decision Making Capacity  Other persons present: None    Healthcare Decision Maker:    Primary Decision Maker: Grey Maciel - Child - [phone]    Today we documented Decision Maker(s) consistent with Legal Next of Kin hierarchy.  Content/Action Overview:  DECLINED ACP Conversation - will revisit periodically    Length of Voluntary ACP Conversation in minutes:  <16 minutes (Non-Billable)    Blair Chao RN

## 2025-04-24 NOTE — CARE COORDINATION
CASE MANAGEMENT ASSESSMENT NOTE    Patient is a 69 year old female with acute asthma exacerbation.      Patient assessment completed at bedside.  Patient presents to assessment alert and oriented, and answers questions appropriately.  She lives at home with daughter.  At baseline, she is independent with transfers, and does not use assistive devices.  She is an active .  Lives in a  single story home with 4 steps to enter.   Patient has [employer] medicare insurance.  She is established with PCP, Dr. Kaye Perez.  Patient is currently wearing oxygen, but that is not her baseline.  She does not anticipate need for therapy services.    At this time, anticipate patient to be discharged home.  CM to follow for potential need for home o2.  PT/OT evals are not anticipated to be needed in this case.  Case management will continue to follow.  Please notify if there are any changes.     Attending Physician: Tim Lynn MD  Admit Problem: Acute asthma exacerbation [J45.901]  Date/Time of Admission: 4/24/2025 11:38 AM  Problem List:  Patient Active Problem List   Diagnosis    S/P TKR (total knee replacement) using cement, left    Arthritis of knee, left    Hypertension    Acute asthma exacerbation    Morbid obesity with body mass index (BMI) of 40.0 or higher ([facility])    Hyperkalemia          04/24/25 1444   Service Assessment   Patient Orientation Alert and Oriented   Cognition Alert   History Provided By Patient   Primary Caregiver Self   Accompanied By/Relationship N/A   Support Systems Children   Patient's Healthcare Decision Maker is: Legal Next of Kin   PCP Verified by CM Yes  (Dr. Kaye Perez)   Last Visit to PCP Within last 3 months   Prior Functional Level Independent in ADLs/IADLs   Current Functional Level Independent in ADLs/IADLs   Can patient return to prior living arrangement Yes   Ability to make needs known: Good   Family able to assist with home care needs: Yes   Would you like for me to discuss

## 2025-04-24 NOTE — ED NOTES
Walking O2 Sats    Patient ambulated 50 ft on RA and turned around to return to bed.   Patient was unsteady on her feet and needed this RN and patient's daughter to steady her. This is not normal for he patient.     O2 sats at start 90%  Sats dropped to 86% during walk.  87% at end.     Patient was returned to bed. O2 sat 89-90% on RA 3 min post ambulation

## 2025-04-24 NOTE — ED NOTES
Walking O2 Sats on RA    Starting 93%  Ending 87%    Patient tolerated the ambulation better this time and was able to make one lap around the ER.   Patient sats remained above 90% for the first 3/4 of the lap and sats aburptly dropped to 87 and stayed until she returned to bed.     Dr Johnson notified

## 2025-04-24 NOTE — ED PROVIDER NOTES
Emergency Department Provider Note       PCP: Kaye Perez MD   Age: 69 y.o.   Sex: female     DISPOSITION    No diagnosis found.    Medical Decision Making     Appears to be in a significant amount of pain.  Has reproducible tenderness above right eye and the temporal region.  CT and labs ordered.  Pain treated. Normal eye pressure.    7:04 AM EDT The patient's care will be transitioned to Dr Johnson.  At this time, the plan is follow up labs, CT, pain control, BP.  Please see that provider's documentation for further information.        1 or more acute illnesses that pose a threat to life or bodily function.   Shared medical decision making was utilized in creating the patients health plan today.  I independently ordered and reviewed each unique test.    I reviewed external records: ED visit note from a different ED.                      History     69-year-old female presents with gradual worsening right sided temporal headache for the past 4 days.  She reports having some sinus congestion and drainage that has resolved.  She reports having migraines in her 20s, but none since.  She also had pinkeye in her left eye with a corneal abrasion about a month ago.  That resolved and then she developed symptoms in her right eye about 2 weeks ago with drainage and crusting.  Those symptoms have also resolved.  She denies any current eye redness or vision changes.  She has occasional light sensitivity.  Denies head injury or trauma.  She was seen by primary care physician yesterday and prescribed Imitrex.  She reports taking several doses last night with minimal improvement.  Denies any focal weakness or numbness.  No fever or neck stiffness.  Blood pressure elevated upon arrival.  She has not taken her morning blood pressure medications.  Denies history of glaucoma.        Physical Exam     Vitals signs and nursing note reviewed:  Vitals:    04/24/25 0623   BP: (!) 180/111   Pulse: 78   Resp: 19   Temp: 97.7 °F

## 2025-04-25 VITALS
DIASTOLIC BLOOD PRESSURE: 83 MMHG | HEART RATE: 71 BPM | TEMPERATURE: 98.2 F | OXYGEN SATURATION: 95 % | RESPIRATION RATE: 15 BRPM | SYSTOLIC BLOOD PRESSURE: 141 MMHG

## 2025-04-25 PROBLEM — E87.5 HYPERKALEMIA: Status: RESOLVED | Noted: 2025-04-24 | Resolved: 2025-04-25

## 2025-04-25 LAB
ANION GAP SERPL CALC-SCNC: 9 MMOL/L (ref 7–16)
BASOPHILS # BLD: 0.01 K/UL (ref 0–0.2)
BASOPHILS NFR BLD: 0.1 % (ref 0–2)
BUN SERPL-MCNC: 13 MG/DL (ref 8–23)
CALCIUM SERPL-MCNC: 9 MG/DL (ref 8.8–10.2)
CHLORIDE SERPL-SCNC: 104 MMOL/L (ref 98–107)
CO2 SERPL-SCNC: 27 MMOL/L (ref 20–29)
CREAT SERPL-MCNC: 0.94 MG/DL (ref 0.6–1.1)
DIFFERENTIAL METHOD BLD: ABNORMAL
EOSINOPHIL # BLD: 0 K/UL (ref 0–0.8)
EOSINOPHIL NFR BLD: 0 % (ref 0.5–7.8)
ERYTHROCYTE [DISTWIDTH] IN BLOOD BY AUTOMATED COUNT: 13.2 % (ref 11.9–14.6)
GLUCOSE SERPL-MCNC: 110 MG/DL (ref 70–99)
HCT VFR BLD AUTO: 41 % (ref 35.8–46.3)
HGB BLD-MCNC: 12.6 G/DL (ref 11.7–15.4)
IMM GRANULOCYTES # BLD AUTO: 0.03 K/UL (ref 0–0.5)
IMM GRANULOCYTES NFR BLD AUTO: 0.3 % (ref 0–5)
LYMPHOCYTES # BLD: 0.88 K/UL (ref 0.5–4.6)
LYMPHOCYTES NFR BLD: 9.5 % (ref 13–44)
MCH RBC QN AUTO: 28.3 PG (ref 26.1–32.9)
MCHC RBC AUTO-ENTMCNC: 30.7 G/DL (ref 31.4–35)
MCV RBC AUTO: 91.9 FL (ref 82–102)
MONOCYTES # BLD: 0.82 K/UL (ref 0.1–1.3)
MONOCYTES NFR BLD: 8.9 % (ref 4–12)
NEUTS SEG # BLD: 7.48 K/UL (ref 1.7–8.2)
NEUTS SEG NFR BLD: 81.2 % (ref 43–78)
NRBC # BLD: 0 K/UL (ref 0–0.2)
PLATELET # BLD AUTO: 285 K/UL (ref 150–450)
PMV BLD AUTO: 10.4 FL (ref 9.4–12.3)
POTASSIUM SERPL-SCNC: 4.7 MMOL/L (ref 3.5–5.1)
RBC # BLD AUTO: 4.46 M/UL (ref 4.05–5.2)
SODIUM SERPL-SCNC: 140 MMOL/L (ref 136–145)
WBC # BLD AUTO: 9.2 K/UL (ref 4.3–11.1)

## 2025-04-25 PROCEDURE — 80048 BASIC METABOLIC PNL TOTAL CA: CPT

## 2025-04-25 PROCEDURE — 96372 THER/PROPH/DIAG INJ SC/IM: CPT

## 2025-04-25 PROCEDURE — 6370000000 HC RX 637 (ALT 250 FOR IP): Performed by: INTERNAL MEDICINE

## 2025-04-25 PROCEDURE — 6360000002 HC RX W HCPCS: Performed by: INTERNAL MEDICINE

## 2025-04-25 PROCEDURE — 36415 COLL VENOUS BLD VENIPUNCTURE: CPT

## 2025-04-25 PROCEDURE — 85025 COMPLETE CBC W/AUTO DIFF WBC: CPT

## 2025-04-25 PROCEDURE — 2500000003 HC RX 250 WO HCPCS: Performed by: INTERNAL MEDICINE

## 2025-04-25 PROCEDURE — 94761 N-INVAS EAR/PLS OXIMETRY MLT: CPT

## 2025-04-25 PROCEDURE — G0378 HOSPITAL OBSERVATION PER HR: HCPCS

## 2025-04-25 PROCEDURE — 2700000000 HC OXYGEN THERAPY PER DAY

## 2025-04-25 PROCEDURE — 94640 AIRWAY INHALATION TREATMENT: CPT

## 2025-04-25 RX ORDER — DEXTROMETHORPHAN HBR. AND GUAIFENESIN 10; 100 MG/5ML; MG/5ML
10 SOLUTION ORAL EVERY 4 HOURS PRN
Status: ON HOLD | COMMUNITY
Start: 2025-04-25 | End: 2025-05-02 | Stop reason: HOSPADM

## 2025-04-25 RX ORDER — TRAMADOL HYDROCHLORIDE 25 MG/1
25 TABLET, COATED ORAL EVERY 6 HOURS PRN
Qty: 12 TABLET | Refills: 0 | Status: ON HOLD | OUTPATIENT
Start: 2025-04-25 | End: 2025-05-02 | Stop reason: HOSPADM

## 2025-04-25 RX ORDER — ALBUTEROL SULFATE 90 UG/1
2 INHALANT RESPIRATORY (INHALATION) EVERY 6 HOURS PRN
Qty: 1 EACH | Refills: 2 | Status: SHIPPED | OUTPATIENT
Start: 2025-04-25

## 2025-04-25 RX ORDER — HYDROCODONE BITARTRATE AND HOMATROPINE METHYLBROMIDE ORAL SOLUTION 5; 1.5 MG/5ML; MG/5ML
5 LIQUID ORAL NIGHTLY PRN
Qty: 35 ML | Refills: 0 | Status: ON HOLD | OUTPATIENT
Start: 2025-04-25 | End: 2025-05-02 | Stop reason: HOSPADM

## 2025-04-25 RX ORDER — ALBUTEROL SULFATE 0.83 MG/ML
2.5 SOLUTION RESPIRATORY (INHALATION) EVERY 4 HOURS PRN
Qty: 30 EACH | Refills: 2 | Status: ON HOLD | OUTPATIENT
Start: 2025-04-25 | End: 2025-05-02

## 2025-04-25 RX ORDER — LORATADINE 10 MG/1
10 TABLET ORAL DAILY
Qty: 30 TABLET | Refills: 2 | Status: ON HOLD | OUTPATIENT
Start: 2025-04-25 | End: 2025-05-02 | Stop reason: HOSPADM

## 2025-04-25 RX ORDER — PREDNISONE 20 MG/1
40 TABLET ORAL DAILY
Status: DISCONTINUED | OUTPATIENT
Start: 2025-04-25 | End: 2025-04-25 | Stop reason: HOSPADM

## 2025-04-25 RX ORDER — PREDNISONE 10 MG/1
TABLET ORAL
Qty: 14 TABLET | Refills: 0 | Status: ON HOLD | OUTPATIENT
Start: 2025-04-25 | End: 2025-05-02 | Stop reason: HOSPADM

## 2025-04-25 RX ORDER — AZITHROMYCIN 500 MG/1
500 TABLET, FILM COATED ORAL DAILY
Qty: 3 TABLET | Refills: 0 | Status: ON HOLD | OUTPATIENT
Start: 2025-04-25 | End: 2025-05-02 | Stop reason: HOSPADM

## 2025-04-25 RX ADMIN — OXYCODONE 5 MG: 5 TABLET ORAL at 04:21

## 2025-04-25 RX ADMIN — SODIUM CHLORIDE, PRESERVATIVE FREE 10 ML: 5 INJECTION INTRAVENOUS at 08:27

## 2025-04-25 RX ADMIN — ACETAMINOPHEN 1000 MG: 500 TABLET, FILM COATED ORAL at 04:21

## 2025-04-25 RX ADMIN — PREDNISONE 40 MG: 20 TABLET ORAL at 08:27

## 2025-04-25 RX ADMIN — ENOXAPARIN SODIUM 30 MG: 100 INJECTION SUBCUTANEOUS at 04:22

## 2025-04-25 RX ADMIN — IPRATROPIUM BROMIDE AND ALBUTEROL SULFATE 1 DOSE: 2.5; .5 SOLUTION RESPIRATORY (INHALATION) at 08:56

## 2025-04-25 ASSESSMENT — PAIN SCALES - GENERAL: PAINLEVEL_OUTOF10: 6

## 2025-04-25 ASSESSMENT — PAIN DESCRIPTION - DESCRIPTORS: DESCRIPTORS: ACHING

## 2025-04-25 ASSESSMENT — PAIN DESCRIPTION - LOCATION: LOCATION: HEAD

## 2025-04-25 NOTE — CARE COORDINATION
Pt completed walk-test and does not qualify for oxygen at discharge.     Pt is for discharge home today with family and no needs/supportive care orders received for MSW at this time.     Please notify CM for any needs that may arise.     LEOPOLDO Garcia, hospitals    [facility]       04/25/25 1019   Service Assessment   Patient's Healthcare Decision Maker is: Legal Next of Kin   Social/Functional History   Lives With Daughter   Type of Home House   Home Layout One level   Home Access Stairs to enter with rails   Entrance Stairs - Number of Steps 4   Bathroom Equipment None   Home Equipment None   Receives Help From Family   Active  Yes   Mode of Transportation Car   Occupation Retired   Services At/After Discharge   Transition of Care Consult (CM Consult) Discharge Planning   Services At/After Discharge None   [place] Resource Information Provided? No   Mode of Transport at Discharge Self   Confirm Follow Up Transport Family   Condition of Participation: Discharge Planning   The Plan for Transition of Care is related to the following treatment goals: Patient to return home with family support at functional baseline.   The Patient and/or Patient Representative was provided with a Choice of Provider? Patient   The Patient and/Or Patient Representative agree with the Discharge Plan? Yes   Freedom of Choice list was provided with basic dialogue that supports the patient's individualized plan of care/goals, treatment preferences, and shares the quality data associated with the providers?  Yes

## 2025-04-25 NOTE — PROGRESS NOTES
Patient does not qualify for home oxygen:        04/25/25 1000   Resting (Room Air)   SpO2 95   HR 71   During Walk (Room Air)   SpO2 93   HR 74   Rate of Dyspnea 0   After Walk   Does the Patient Qualify for Home O2 No   Does the Patient Need Portable Oxygen Tanks No

## 2025-04-25 NOTE — DISCHARGE SUMMARY
Hospitalist Discharge Summary   Admit Date:  2025 11:38 AM   DC Note date: 2025  Name:  Ntia Chen   Age:  69 y.o.  Sex:  female  :  1955   MRN:  [id number]   Room:  [facility]  PCP:  Kaye Perez MD    Presenting Complaint: No chief complaint on file.     Initial Admission Diagnosis: Acute asthma exacerbation [J45.901]     Problem List for this Hospitalization (present on admission):    Principal Problem:    Extrinsic asthma with acute exacerbation  Active Problems:    Hypertension    Morbid obesity with body mass index (BMI) of 40.0 or higher (formerly [facility])  Resolved Problems:    Hyperkalemia      Hospital Course:  Nita Chen is a 69 y.o. female with medical history of asthma, hypertension, who presented with headache and shortness of breath.  Patient reports that she had concerns of a sinus infection a week or 2 ago but that improved.  About 4 days ago she developed a headache, mostly constant at the temples, not associated with vision changes or vertigo or lightheadedness.  She was seen by PCP who gave her Imitrex which did not help.  She reports significant allergies and asthma this time a year and been using her albuterol inhaler without relief.  She says she has been outside playing with her grandkids the day her symptoms started.   She says she usually doesn't get symptoms like this unless she goes outside and typically doesn't this time of year.  she does not take any steroids inhaled or otherwise.  She says she tried Singulair in the past but this did not help. She came to the ER for evaluation.  She was wheezing on exam and had hypoxia with ambulation.  She was given steroids with symptomatic improvement but given her ambulatory hypoxia hospitalist called for admission.     asthma exacerbation suspected from allergens.  Observation overnight.  Not requiring oxygen and no wheezing yesterday or today. Doubt bacterial infection but will give z umesh in case.   Needs to take

## 2025-04-25 NOTE — PLAN OF CARE
Problem: Discharge Planning  Goal: Discharge to home or other facility with appropriate resources  4/25/2025 1052 by Elayne Ram RN  Outcome: Progressing  4/24/2025 2255 by Shruthi Brito RN  Outcome: Progressing     Problem: Safety - Adult  Goal: Free from fall injury  4/25/2025 1052 by Elayne Ram RN  Outcome: Progressing  4/24/2025 2255 by Shruthi Brito RN  Outcome: Progressing     Problem: ABCDS Injury Assessment  Goal: Absence of physical injury  4/25/2025 1052 by Elayne Ram RN  Outcome: Progressing  4/24/2025 2255 by Shruthi Brito RN  Outcome: Progressing     Problem: Respiratory - Adult  Goal: Achieves optimal ventilation and oxygenation  4/25/2025 1052 by Elayne Ram RN  Outcome: Progressing  4/24/2025 2255 by Shruthi Brito RN  Outcome: Progressing

## 2025-04-26 ENCOUNTER — HOSPITAL ENCOUNTER (EMERGENCY)
Age: 70
Discharge: HOME | DRG: 103 | End: 2025-04-27
Attending: EMERGENCY MEDICINE
Payer: MEDICARE

## 2025-04-26 DIAGNOSIS — R51.9 NONINTRACTABLE HEADACHE, UNSPECIFIED CHRONICITY PATTERN, UNSPECIFIED HEADACHE TYPE: Primary | ICD-10-CM

## 2025-04-26 DIAGNOSIS — N30.00 ACUTE CYSTITIS WITHOUT HEMATURIA: ICD-10-CM

## 2025-04-26 DIAGNOSIS — R11.2 NAUSEA AND VOMITING, UNSPECIFIED VOMITING TYPE: ICD-10-CM

## 2025-04-26 LAB
ALBUMIN SERPL-MCNC: 4.1 G/DL (ref 3.2–4.6)
ALBUMIN/GLOB SERPL: 1.5 (ref 1–1.9)
ALP SERPL-CCNC: 55 U/L (ref 35–104)
ALT SERPL-CCNC: <5 U/L (ref 12–65)
ANION GAP SERPL CALC-SCNC: 11 MMOL/L (ref 7–16)
AST SERPL-CCNC: 11 U/L (ref 15–37)
BASOPHILS # BLD: 0.02 K/UL (ref 0–0.2)
BASOPHILS NFR BLD: 0.3 % (ref 0–2)
BILIRUB SERPL-MCNC: 0.4 MG/DL (ref 0–1.2)
BUN SERPL-MCNC: 11 MG/DL (ref 8–23)
CALCIUM SERPL-MCNC: 9.3 MG/DL (ref 8.8–10.2)
CHLORIDE SERPL-SCNC: 104 MMOL/L (ref 98–107)
CO2 SERPL-SCNC: 26 MMOL/L (ref 20–29)
CREAT SERPL-MCNC: 0.72 MG/DL (ref 0.8–1.3)
DIFFERENTIAL METHOD BLD: NORMAL
EOSINOPHIL # BLD: 0.11 K/UL (ref 0–0.8)
EOSINOPHIL NFR BLD: 1.8 % (ref 0.5–7.8)
ERYTHROCYTE [DISTWIDTH] IN BLOOD BY AUTOMATED COUNT: 13.5 % (ref 11.9–14.6)
GLOBULIN SER CALC-MCNC: 2.8 G/DL (ref 2.3–3.5)
GLUCOSE SERPL-MCNC: 102 MG/DL (ref 65–100)
HCT VFR BLD AUTO: 42.7 % (ref 35.8–46.3)
HGB BLD-MCNC: 13.4 G/DL (ref 11.7–15.4)
IMM GRANULOCYTES # BLD AUTO: 0.03 K/UL (ref 0–0.5)
IMM GRANULOCYTES NFR BLD AUTO: 0.5 % (ref 0–5)
LIPASE SERPL-CCNC: 8 U/L (ref 13–60)
LYMPHOCYTES # BLD: 1.92 K/UL (ref 0.5–4.6)
LYMPHOCYTES NFR BLD: 30.7 % (ref 13–44)
MAGNESIUM SERPL-MCNC: 2 MG/DL (ref 1.8–2.4)
MCH RBC QN AUTO: 28.4 PG (ref 26.1–32.9)
MCHC RBC AUTO-ENTMCNC: 31.4 G/DL (ref 31.4–35)
MCV RBC AUTO: 90.5 FL (ref 82–102)
MONOCYTES # BLD: 0.67 K/UL (ref 0.1–1.3)
MONOCYTES NFR BLD: 10.7 % (ref 4–12)
NEUTS SEG # BLD: 3.51 K/UL (ref 1.7–8.2)
NEUTS SEG NFR BLD: 56 % (ref 43–78)
NRBC # BLD: 0 K/UL (ref 0–0.2)
PLATELET # BLD AUTO: 267 K/UL (ref 150–450)
PMV BLD AUTO: 9.6 FL (ref 9.4–12.3)
POTASSIUM SERPL-SCNC: 4.4 MMOL/L (ref 3.5–5.1)
PROT SERPL-MCNC: 6.9 G/DL (ref 6.3–8.2)
RBC # BLD AUTO: 4.72 M/UL (ref 4.05–5.2)
SODIUM SERPL-SCNC: 141 MMOL/L (ref 133–143)
WBC # BLD AUTO: 6.3 K/UL (ref 4.3–11.1)

## 2025-04-26 PROCEDURE — 6360000002 HC RX W HCPCS: Performed by: EMERGENCY MEDICINE

## 2025-04-26 PROCEDURE — 85025 COMPLETE CBC W/AUTO DIFF WBC: CPT

## 2025-04-26 PROCEDURE — 83735 ASSAY OF MAGNESIUM: CPT

## 2025-04-26 PROCEDURE — 80053 COMPREHEN METABOLIC PANEL: CPT

## 2025-04-26 PROCEDURE — 83690 ASSAY OF LIPASE: CPT

## 2025-04-26 PROCEDURE — 93005 ELECTROCARDIOGRAM TRACING: CPT | Performed by: EMERGENCY MEDICINE

## 2025-04-26 RX ORDER — METOCLOPRAMIDE HYDROCHLORIDE 5 MG/ML
10 INJECTION INTRAMUSCULAR; INTRAVENOUS
Status: COMPLETED | OUTPATIENT
Start: 2025-04-26 | End: 2025-04-26

## 2025-04-26 RX ORDER — KETOROLAC TROMETHAMINE 15 MG/ML
15 INJECTION, SOLUTION INTRAMUSCULAR; INTRAVENOUS
Status: COMPLETED | OUTPATIENT
Start: 2025-04-26 | End: 2025-04-26

## 2025-04-26 RX ORDER — DIPHENHYDRAMINE HYDROCHLORIDE 50 MG/ML
12.5 INJECTION, SOLUTION INTRAMUSCULAR; INTRAVENOUS
Status: COMPLETED | OUTPATIENT
Start: 2025-04-26 | End: 2025-04-26

## 2025-04-26 RX ADMIN — DIPHENHYDRAMINE HYDROCHLORIDE 12.5 MG: 50 INJECTION INTRAMUSCULAR; INTRAVENOUS at 23:09

## 2025-04-26 RX ADMIN — KETOROLAC TROMETHAMINE 15 MG: 15 INJECTION, SOLUTION INTRAMUSCULAR; INTRAVENOUS at 23:10

## 2025-04-26 RX ADMIN — METOCLOPRAMIDE 10 MG: 5 INJECTION, SOLUTION INTRAMUSCULAR; INTRAVENOUS at 23:10

## 2025-04-26 ASSESSMENT — PAIN DESCRIPTION - PAIN TYPE: TYPE: ACUTE PAIN

## 2025-04-26 ASSESSMENT — PAIN DESCRIPTION - LOCATION
LOCATION: HEAD
LOCATION: HEAD

## 2025-04-26 ASSESSMENT — PAIN - FUNCTIONAL ASSESSMENT: PAIN_FUNCTIONAL_ASSESSMENT: 0-10

## 2025-04-26 ASSESSMENT — PAIN SCALES - GENERAL
PAINLEVEL_OUTOF10: 10
PAINLEVEL_OUTOF10: 3

## 2025-04-26 ASSESSMENT — LIFESTYLE VARIABLES
HOW OFTEN DO YOU HAVE A DRINK CONTAINING ALCOHOL: NEVER
HOW MANY STANDARD DRINKS CONTAINING ALCOHOL DO YOU HAVE ON A TYPICAL DAY: PATIENT DOES NOT DRINK

## 2025-04-27 VITALS
DIASTOLIC BLOOD PRESSURE: 73 MMHG | RESPIRATION RATE: 17 BRPM | BODY MASS INDEX: 40.07 KG/M2 | OXYGEN SATURATION: 95 % | HEIGHT: 67 IN | HEART RATE: 72 BPM | SYSTOLIC BLOOD PRESSURE: 123 MMHG | WEIGHT: 255.29 LBS | TEMPERATURE: 98.1 F

## 2025-04-27 LAB
APPEARANCE UR: ABNORMAL
BACTERIA URNS QL MICRO: ABNORMAL /HPF
BILIRUB UR QL: ABNORMAL
COLOR UR: YELLOW
EKG ATRIAL RATE: 69 BPM
EKG DIAGNOSIS: NORMAL
EKG P AXIS: 97 DEGREES
EKG P-R INTERVAL: 150 MS
EKG Q-T INTERVAL: 426 MS
EKG QRS DURATION: 124 MS
EKG QTC CALCULATION (BAZETT): 447 MS
EKG R AXIS: -12 DEGREES
EKG T AXIS: 36 DEGREES
EKG VENTRICULAR RATE: 89 BPM
EPI CELLS #/AREA URNS HPF: ABNORMAL /HPF
GLUCOSE UR STRIP.AUTO-MCNC: NEGATIVE MG/DL
HGB UR QL STRIP: ABNORMAL
KETONES UR QL STRIP.AUTO: ABNORMAL MG/DL
LEUKOCYTE ESTERASE UR QL STRIP.AUTO: ABNORMAL
MUCOUS THREADS URNS QL MICRO: ABNORMAL /LPF
NITRITE UR QL STRIP.AUTO: NEGATIVE
OTHER OBSERVATIONS: ABNORMAL
PH UR STRIP: 6 (ref 5–9)
PROT UR STRIP-MCNC: 30 MG/DL
RBC #/AREA URNS HPF: ABNORMAL /HPF
SP GR UR REFRACTOMETRY: 1.02 (ref 1–1.02)
UROBILINOGEN UR QL STRIP.AUTO: 1 EU/DL (ref 0.2–1)
WBC URNS QL MICRO: ABNORMAL /HPF

## 2025-04-27 PROCEDURE — 93010 ELECTROCARDIOGRAM REPORT: CPT | Performed by: INTERNAL MEDICINE

## 2025-04-27 PROCEDURE — 81001 URINALYSIS AUTO W/SCOPE: CPT

## 2025-04-27 PROCEDURE — 87086 URINE CULTURE/COLONY COUNT: CPT

## 2025-04-27 RX ORDER — CEPHALEXIN 500 MG/1
500 CAPSULE ORAL 2 TIMES DAILY
Qty: 14 CAPSULE | Refills: 0 | Status: ON HOLD | OUTPATIENT
Start: 2025-04-27 | End: 2025-05-02 | Stop reason: HOSPADM

## 2025-04-27 RX ORDER — METOCLOPRAMIDE 10 MG/1
10 TABLET ORAL 3 TIMES DAILY PRN
Qty: 20 TABLET | Refills: 0 | Status: ON HOLD | OUTPATIENT
Start: 2025-04-27 | End: 2025-05-02 | Stop reason: HOSPADM

## 2025-04-27 RX ORDER — CEPHALEXIN 500 MG/1
500 CAPSULE ORAL 2 TIMES DAILY
Qty: 14 CAPSULE | Refills: 0 | Status: SHIPPED | OUTPATIENT
Start: 2025-04-27 | End: 2025-04-27

## 2025-04-27 RX ORDER — METOCLOPRAMIDE 10 MG/1
10 TABLET ORAL 3 TIMES DAILY PRN
Qty: 20 TABLET | Refills: 0 | Status: SHIPPED | OUTPATIENT
Start: 2025-04-27 | End: 2025-04-27

## 2025-04-27 NOTE — DISCHARGE INSTRUCTIONS
Discontinue tramadol  Take medications as prescribed  Drink plenty of fluids  Follow-up with your primary care provider  Return to the ER for any new, worsening or life-threatening symptoms

## 2025-04-27 NOTE — ED TRIAGE NOTES
Pt ambulatory to triage with slow gait. Pt states she has been unable to keep anything down all day, vomiting noted in triage, and has a migraine. Pt noted to have a swollen R eye.

## 2025-04-27 NOTE — PROGRESS NOTES
Patient mobility status  with no difficulty.     I have reviewed discharge instructions with the patient and Daughter.  The patient and Daughter verbalized understanding.    Patient left ED via Discharge Method: wheelchair to Home with Child.    Opportunity for questions and clarification provided.     Patient given 2 scripts.

## 2025-04-27 NOTE — ED PROVIDER NOTES
Emergency Department Provider Note       S EMERGENCY DEPT   PCP: Kaye Perez MD   Age: 69 y.o.   Sex: female     DISPOSITION Decision To Discharge 04/27/2025 12:39:44 AM    ICD-10-CM    1. Nonintractable headache, unspecified chronicity pattern, unspecified headache type  R51.9 [facility] [place]      2. Nausea and vomiting, unspecified vomiting type  R11.2       3. Acute cystitis without hematuria  N30.00           Medical Decision Making     Has had a recent imaging of her head that showed no acute abnormalities.  Is not hypertensive here but mildly tachycardic.  Will treat with migraine cocktail.  Given her vomiting we will labs including electrolytes.  Continue to monitor symptoms    11:53 PM EDT  Laboratory data is reassuring.  Upon reassessment patient is resting comfortably endorses improvement in headache at this time.  Will continue to monitor symptoms    1:03 AM EDT  Patient remains symptomatically improved.  Urinalysis does show some signs of infection.  Will send for culture and placed on antibiotics    Encourage close follow-up with primary care provider  Given recurrence of headache I feel it is prudent to have her referred to neurology.  She at one time was taking Imitrex we will have her discontinue this.  Will prescribe Reglan as needed    Will have her also discontinue her tramadol     1 or more acute illnesses that pose a threat to life or bodily function.   1 or more chronic illnesses with a severe exacerbation or progression.  Prescription drug management performed.  Chronic medical problems impacting care include hypertension.  Shared medical decision making was utilized in creating the patients health plan today.  I independently ordered and reviewed each unique test.    I reviewed external records: ED visit note from a different ED.   I reviewed external records: provider visit note from PCP.  I reviewed external records: provider visit note from outside  Living Expenses: Not hard at all     Difficulty Paying Medical Expenses: Yes   Food Insecurity: No Food Insecurity (4/23/2025)    Received from [facility]    Hunger Vital Sign     Worried About Running Out of Food in the Last Year: Never true     Ran Out of Food in the Last Year: Never true   Recent Concern: Food Insecurity - Food Insecurity Present (4/23/2025)    Received from [facility]    Food Insecurity     Worried about Running Out of Food in the Last Year: Sometimes true     Ran Out of Food in the Last Year: Never true   Transportation Needs: Not At Risk (4/23/2025)    Received from [facility] - Transportation     Lack of Transportation: No   Physical Activity: Insufficiently Active (4/23/2025)    Received from [facility]    Physical Activity     Days of Exercise per Week: 3 days     On average, how many minutes do you exercise per day?: 20     Total Minutes of Exercise Per Week: 60   Stress: No Stress Concern Present (4/23/2025)    Received from [facility]    Stress     Feeling of Stress : Only a little   Social Connections: Not At Risk (4/23/2025)    Received from [facility] - Social Connections     Needs Help with Day-to-Day Activities: I don't need any help     Frequency of Feeling Lonely or Isolated: Never   Intimate Partner Violence: Not At Risk (8/11/2022)    Received from [facility]    Intimate Partner Violence     Fear of Current or Ex-Partner: No     Emotionally Abused: No     Physically Abused: No     Sexually Abused: No   Housing Stability: Not At Risk (4/23/2025)    Received from [facility] - Inadequate Housing     Current Living Situation: I have a steady place to live     Housing Problems: None of the above   Recent Concern: Housing Stability - At Risk (4/23/2025)    Received from [facility]    Housing Stability     Was there a time when you did not have a steady place to sleep: Yes     Worried that the place you are staying is making you sick: No

## 2025-04-28 ENCOUNTER — HOSPITAL ENCOUNTER (EMERGENCY)
Age: 70
Discharge: ANOTHER HOSPITAL | DRG: 103 | End: 2025-04-28
Attending: EMERGENCY MEDICINE
Payer: MEDICARE

## 2025-04-28 ENCOUNTER — APPOINTMENT (OUTPATIENT)
Dept: GENERAL RADIOLOGY | Age: 70
DRG: 103 | End: 2025-04-28
Payer: MEDICARE

## 2025-04-28 ENCOUNTER — APPOINTMENT (OUTPATIENT)
Dept: CT IMAGING | Age: 70
DRG: 103 | End: 2025-04-28
Payer: MEDICARE

## 2025-04-28 ENCOUNTER — HOSPITAL ENCOUNTER (INPATIENT)
Age: 70
LOS: 4 days | Discharge: HOME | DRG: 103 | End: 2025-05-02
Attending: HOSPITALIST | Admitting: FAMILY MEDICINE
Payer: MEDICARE

## 2025-04-28 VITALS
RESPIRATION RATE: 16 BRPM | BODY MASS INDEX: 40.02 KG/M2 | OXYGEN SATURATION: 96 % | SYSTOLIC BLOOD PRESSURE: 167 MMHG | HEART RATE: 71 BPM | HEIGHT: 67 IN | TEMPERATURE: 99.3 F | WEIGHT: 255 LBS | DIASTOLIC BLOOD PRESSURE: 87 MMHG

## 2025-04-28 DIAGNOSIS — G44.039 PAROXYSMAL HEMICRANIA: Primary | ICD-10-CM

## 2025-04-28 DIAGNOSIS — J45.909 EXTRINSIC ASTHMA WITHOUT COMPLICATION, UNSPECIFIED ASTHMA SEVERITY, UNSPECIFIED WHETHER PERSISTENT: Chronic | ICD-10-CM

## 2025-04-28 DIAGNOSIS — J45.909 UNCOMPLICATED ASTHMA, UNSPECIFIED ASTHMA SEVERITY, UNSPECIFIED WHETHER PERSISTENT: ICD-10-CM

## 2025-04-28 DIAGNOSIS — R51.9 RIGHT-SIDED FACE PAIN: Primary | ICD-10-CM

## 2025-04-28 DIAGNOSIS — R11.2 NAUSEA AND VOMITING, UNSPECIFIED VOMITING TYPE: ICD-10-CM

## 2025-04-28 DIAGNOSIS — R51.9 INTRACTABLE HEADACHE, UNSPECIFIED CHRONICITY PATTERN, UNSPECIFIED HEADACHE TYPE: ICD-10-CM

## 2025-04-28 DIAGNOSIS — G47.30 SLEEP APNEA, UNSPECIFIED TYPE: ICD-10-CM

## 2025-04-28 PROBLEM — G44.001 INTRACTABLE CLUSTER HEADACHE: Status: ACTIVE | Noted: 2025-04-28

## 2025-04-28 PROBLEM — N30.00 ACUTE CYSTITIS: Status: ACTIVE | Noted: 2025-04-28

## 2025-04-28 PROBLEM — R11.14 BILIOUS VOMITING WITH NAUSEA: Status: ACTIVE | Noted: 2025-04-28

## 2025-04-28 LAB
ALBUMIN SERPL-MCNC: 4.2 G/DL (ref 3.2–4.6)
ALBUMIN/GLOB SERPL: 1.5 (ref 1–1.9)
ALP SERPL-CCNC: 57 U/L (ref 35–104)
ALT SERPL-CCNC: 5 U/L (ref 12–65)
ANION GAP SERPL CALC-SCNC: 9 MMOL/L (ref 7–16)
AST SERPL-CCNC: 7 U/L (ref 15–37)
BACTERIA SPEC CULT: ABNORMAL
BACTERIA SPEC CULT: ABNORMAL
BASOPHILS # BLD: 0.02 K/UL (ref 0–0.2)
BASOPHILS NFR BLD: 0.3 % (ref 0–2)
BILIRUB SERPL-MCNC: 0.7 MG/DL (ref 0–1.2)
BUN SERPL-MCNC: 10 MG/DL (ref 8–23)
CALCIUM SERPL-MCNC: 9.7 MG/DL (ref 8.8–10.2)
CHLORIDE SERPL-SCNC: 102 MMOL/L (ref 98–107)
CO2 SERPL-SCNC: 29 MMOL/L (ref 20–29)
CREAT SERPL-MCNC: 0.78 MG/DL (ref 0.8–1.3)
CRP SERPL-MCNC: <0.3 MG/DL (ref 0–0.9)
DIFFERENTIAL METHOD BLD: NORMAL
EKG ATRIAL RATE: 67 BPM
EKG DIAGNOSIS: NORMAL
EKG P AXIS: 64 DEGREES
EKG P-R INTERVAL: 140 MS
EKG Q-T INTERVAL: 430 MS
EKG QRS DURATION: 118 MS
EKG QTC CALCULATION (BAZETT): 454 MS
EKG R AXIS: -54 DEGREES
EKG T AXIS: 23 DEGREES
EKG VENTRICULAR RATE: 67 BPM
EOSINOPHIL # BLD: 0.06 K/UL (ref 0–0.8)
EOSINOPHIL NFR BLD: 0.9 % (ref 0.5–7.8)
ERYTHROCYTE [DISTWIDTH] IN BLOOD BY AUTOMATED COUNT: 13 % (ref 11.9–14.6)
ERYTHROCYTE [SEDIMENTATION RATE] IN BLOOD: 5 MM/HR (ref 0–30)
GLOBULIN SER CALC-MCNC: 2.8 G/DL (ref 2.3–3.5)
GLUCOSE SERPL-MCNC: 100 MG/DL (ref 65–100)
HCT VFR BLD AUTO: 42.9 % (ref 35.8–46.3)
HGB BLD-MCNC: 13.6 G/DL (ref 11.7–15.4)
IMM GRANULOCYTES # BLD AUTO: 0.03 K/UL (ref 0–0.5)
IMM GRANULOCYTES NFR BLD AUTO: 0.4 % (ref 0–5)
LYMPHOCYTES # BLD: 1.7 K/UL (ref 0.5–4.6)
LYMPHOCYTES NFR BLD: 24.4 % (ref 13–44)
MAGNESIUM SERPL-MCNC: 1.9 MG/DL (ref 1.8–2.4)
MCH RBC QN AUTO: 28.1 PG (ref 26.1–32.9)
MCHC RBC AUTO-ENTMCNC: 31.7 G/DL (ref 31.4–35)
MCV RBC AUTO: 88.6 FL (ref 82–102)
MONOCYTES # BLD: 0.66 K/UL (ref 0.1–1.3)
MONOCYTES NFR BLD: 9.5 % (ref 4–12)
NEUTS SEG # BLD: 4.5 K/UL (ref 1.7–8.2)
NEUTS SEG NFR BLD: 64.5 % (ref 43–78)
NRBC # BLD: 0 K/UL (ref 0–0.2)
PLATELET # BLD AUTO: 305 K/UL (ref 150–450)
PMV BLD AUTO: 10.1 FL (ref 9.4–12.3)
POTASSIUM SERPL-SCNC: 4.1 MMOL/L (ref 3.5–5.1)
PROT SERPL-MCNC: 7 G/DL (ref 6.3–8.2)
RBC # BLD AUTO: 4.84 M/UL (ref 4.05–5.2)
SERVICE CMNT-IMP: ABNORMAL
SODIUM SERPL-SCNC: 140 MMOL/L (ref 133–143)
WBC # BLD AUTO: 7 K/UL (ref 4.3–11.1)

## 2025-04-28 PROCEDURE — 86140 C-REACTIVE PROTEIN: CPT

## 2025-04-28 PROCEDURE — 2500000003 HC RX 250 WO HCPCS: Performed by: FAMILY MEDICINE

## 2025-04-28 PROCEDURE — 85025 COMPLETE CBC W/AUTO DIFF WBC: CPT

## 2025-04-28 PROCEDURE — 96374 THER/PROPH/DIAG INJ IV PUSH: CPT

## 2025-04-28 PROCEDURE — 71045 X-RAY EXAM CHEST 1 VIEW: CPT

## 2025-04-28 PROCEDURE — 93005 ELECTROCARDIOGRAM TRACING: CPT | Performed by: EMERGENCY MEDICINE

## 2025-04-28 PROCEDURE — 96375 TX/PRO/DX INJ NEW DRUG ADDON: CPT

## 2025-04-28 PROCEDURE — 85652 RBC SED RATE AUTOMATED: CPT

## 2025-04-28 PROCEDURE — 93010 ELECTROCARDIOGRAM REPORT: CPT | Performed by: INTERNAL MEDICINE

## 2025-04-28 PROCEDURE — 6360000004 HC RX CONTRAST MEDICATION: Performed by: EMERGENCY MEDICINE

## 2025-04-28 PROCEDURE — 6360000002 HC RX W HCPCS: Performed by: EMERGENCY MEDICINE

## 2025-04-28 PROCEDURE — 2580000003 HC RX 258: Performed by: EMERGENCY MEDICINE

## 2025-04-28 PROCEDURE — 94762 N-INVAS EAR/PLS OXIMTRY CONT: CPT

## 2025-04-28 PROCEDURE — 99285 EMERGENCY DEPT VISIT HI MDM: CPT

## 2025-04-28 PROCEDURE — 80053 COMPREHEN METABOLIC PANEL: CPT

## 2025-04-28 PROCEDURE — 6370000000 HC RX 637 (ALT 250 FOR IP): Performed by: EMERGENCY MEDICINE

## 2025-04-28 PROCEDURE — 2580000003 HC RX 258: Performed by: FAMILY MEDICINE

## 2025-04-28 PROCEDURE — 1100000003 HC PRIVATE W/ TELEMETRY

## 2025-04-28 PROCEDURE — 70487 CT MAXILLOFACIAL W/DYE: CPT

## 2025-04-28 PROCEDURE — 6360000002 HC RX W HCPCS: Performed by: FAMILY MEDICINE

## 2025-04-28 PROCEDURE — 83735 ASSAY OF MAGNESIUM: CPT

## 2025-04-28 PROCEDURE — 6370000000 HC RX 637 (ALT 250 FOR IP): Performed by: FAMILY MEDICINE

## 2025-04-28 PROCEDURE — 36415 COLL VENOUS BLD VENIPUNCTURE: CPT

## 2025-04-28 RX ORDER — MORPHINE SULFATE 2 MG/ML
2 INJECTION, SOLUTION INTRAMUSCULAR; INTRAVENOUS EVERY 4 HOURS PRN
Status: DISCONTINUED | OUTPATIENT
Start: 2025-04-28 | End: 2025-04-30

## 2025-04-28 RX ORDER — PROCHLORPERAZINE EDISYLATE 5 MG/ML
10 INJECTION INTRAMUSCULAR; INTRAVENOUS
Status: COMPLETED | OUTPATIENT
Start: 2025-04-28 | End: 2025-04-28

## 2025-04-28 RX ORDER — SODIUM CHLORIDE 0.9 % (FLUSH) 0.9 %
5-40 SYRINGE (ML) INJECTION EVERY 12 HOURS SCHEDULED
Status: DISCONTINUED | OUTPATIENT
Start: 2025-04-28 | End: 2025-05-02 | Stop reason: HOSPADM

## 2025-04-28 RX ORDER — SODIUM CHLORIDE 9 MG/ML
INJECTION, SOLUTION INTRAVENOUS PRN
Status: DISCONTINUED | OUTPATIENT
Start: 2025-04-28 | End: 2025-05-02 | Stop reason: HOSPADM

## 2025-04-28 RX ORDER — METOCLOPRAMIDE HYDROCHLORIDE 5 MG/ML
10 INJECTION INTRAMUSCULAR; INTRAVENOUS EVERY 6 HOURS PRN
Status: DISCONTINUED | OUTPATIENT
Start: 2025-04-28 | End: 2025-04-29

## 2025-04-28 RX ORDER — ACETAMINOPHEN 325 MG/1
650 TABLET ORAL EVERY 6 HOURS PRN
Status: DISCONTINUED | OUTPATIENT
Start: 2025-04-28 | End: 2025-05-02 | Stop reason: HOSPADM

## 2025-04-28 RX ORDER — ONDANSETRON 2 MG/ML
4 INJECTION INTRAMUSCULAR; INTRAVENOUS EVERY 6 HOURS PRN
Status: DISCONTINUED | OUTPATIENT
Start: 2025-04-28 | End: 2025-05-02 | Stop reason: HOSPADM

## 2025-04-28 RX ORDER — POTASSIUM CHLORIDE 1500 MG/1
40 TABLET, EXTENDED RELEASE ORAL PRN
Status: DISCONTINUED | OUTPATIENT
Start: 2025-04-28 | End: 2025-05-02 | Stop reason: HOSPADM

## 2025-04-28 RX ORDER — TETRACAINE HYDROCHLORIDE 5 MG/ML
1 SOLUTION OPHTHALMIC
Status: COMPLETED | OUTPATIENT
Start: 2025-04-28 | End: 2025-04-28

## 2025-04-28 RX ORDER — ACETAMINOPHEN 650 MG/1
650 SUPPOSITORY RECTAL EVERY 6 HOURS PRN
Status: DISCONTINUED | OUTPATIENT
Start: 2025-04-28 | End: 2025-05-02 | Stop reason: HOSPADM

## 2025-04-28 RX ORDER — MORPHINE SULFATE 4 MG/ML
4 INJECTION INTRAVENOUS ONCE
Refills: 0 | Status: COMPLETED | OUTPATIENT
Start: 2025-04-28 | End: 2025-04-28

## 2025-04-28 RX ORDER — POTASSIUM CHLORIDE 7.45 MG/ML
10 INJECTION INTRAVENOUS PRN
Status: DISCONTINUED | OUTPATIENT
Start: 2025-04-28 | End: 2025-05-02 | Stop reason: HOSPADM

## 2025-04-28 RX ORDER — BISACODYL 10 MG
10 SUPPOSITORY, RECTAL RECTAL DAILY PRN
Status: DISCONTINUED | OUTPATIENT
Start: 2025-04-28 | End: 2025-05-02 | Stop reason: HOSPADM

## 2025-04-28 RX ORDER — ONDANSETRON 4 MG/1
4 TABLET, ORALLY DISINTEGRATING ORAL EVERY 8 HOURS PRN
Status: DISCONTINUED | OUTPATIENT
Start: 2025-04-28 | End: 2025-05-02 | Stop reason: HOSPADM

## 2025-04-28 RX ORDER — MORPHINE SULFATE 4 MG/ML
4 INJECTION, SOLUTION INTRAMUSCULAR; INTRAVENOUS EVERY 4 HOURS PRN
Status: DISCONTINUED | OUTPATIENT
Start: 2025-04-28 | End: 2025-04-29

## 2025-04-28 RX ORDER — ALBUTEROL SULFATE 0.83 MG/ML
2.5 SOLUTION RESPIRATORY (INHALATION) EVERY 4 HOURS PRN
Status: DISCONTINUED | OUTPATIENT
Start: 2025-04-28 | End: 2025-05-02 | Stop reason: HOSPADM

## 2025-04-28 RX ORDER — 0.9 % SODIUM CHLORIDE 0.9 %
500 INTRAVENOUS SOLUTION INTRAVENOUS
Status: COMPLETED | OUTPATIENT
Start: 2025-04-28 | End: 2025-04-28

## 2025-04-28 RX ORDER — ENOXAPARIN SODIUM 100 MG/ML
30 INJECTION SUBCUTANEOUS 2 TIMES DAILY
Status: DISCONTINUED | OUTPATIENT
Start: 2025-04-29 | End: 2025-05-02 | Stop reason: HOSPADM

## 2025-04-28 RX ORDER — SODIUM CHLORIDE 0.9 % (FLUSH) 0.9 %
5-40 SYRINGE (ML) INJECTION PRN
Status: DISCONTINUED | OUTPATIENT
Start: 2025-04-28 | End: 2025-05-02 | Stop reason: HOSPADM

## 2025-04-28 RX ORDER — SODIUM CHLORIDE 9 MG/ML
INJECTION, SOLUTION INTRAVENOUS CONTINUOUS
Status: ACTIVE | OUTPATIENT
Start: 2025-04-28 | End: 2025-04-29

## 2025-04-28 RX ORDER — DIPHENHYDRAMINE HYDROCHLORIDE 50 MG/ML
25 INJECTION, SOLUTION INTRAMUSCULAR; INTRAVENOUS
Status: COMPLETED | OUTPATIENT
Start: 2025-04-28 | End: 2025-04-28

## 2025-04-28 RX ORDER — MAGNESIUM SULFATE IN WATER 40 MG/ML
2000 INJECTION, SOLUTION INTRAVENOUS PRN
Status: DISCONTINUED | OUTPATIENT
Start: 2025-04-28 | End: 2025-05-02 | Stop reason: HOSPADM

## 2025-04-28 RX ORDER — MECLIZINE HYDROCHLORIDE 25 MG/1
25 TABLET ORAL
Status: COMPLETED | OUTPATIENT
Start: 2025-04-28 | End: 2025-04-28

## 2025-04-28 RX ORDER — ENALAPRILAT 1.25 MG/ML
1.25 INJECTION INTRAVENOUS EVERY 6 HOURS
Status: DISCONTINUED | OUTPATIENT
Start: 2025-04-28 | End: 2025-05-01

## 2025-04-28 RX ORDER — IOPAMIDOL 755 MG/ML
100 INJECTION, SOLUTION INTRAVASCULAR
Status: COMPLETED | OUTPATIENT
Start: 2025-04-28 | End: 2025-04-28

## 2025-04-28 RX ADMIN — ACETAMINOPHEN 650 MG: 325 TABLET ORAL at 21:56

## 2025-04-28 RX ADMIN — PROCHLORPERAZINE EDISYLATE 10 MG: 5 INJECTION INTRAMUSCULAR; INTRAVENOUS at 16:06

## 2025-04-28 RX ADMIN — WATER 1000 MG: 1 INJECTION INTRAMUSCULAR; INTRAVENOUS; SUBCUTANEOUS at 21:55

## 2025-04-28 RX ADMIN — ONDANSETRON 4 MG: 2 INJECTION, SOLUTION INTRAMUSCULAR; INTRAVENOUS at 21:55

## 2025-04-28 RX ADMIN — TETRACAINE HYDROCHLORIDE 1 DROP: 5 SOLUTION OPHTHALMIC at 13:23

## 2025-04-28 RX ADMIN — IOPAMIDOL 100 ML: 755 INJECTION, SOLUTION INTRAVENOUS at 15:25

## 2025-04-28 RX ADMIN — SODIUM CHLORIDE 500 ML: 0.9 INJECTION, SOLUTION INTRAVENOUS at 15:44

## 2025-04-28 RX ADMIN — ENALAPRILAT 1.25 MG: 2.5 INJECTION INTRAVENOUS at 23:08

## 2025-04-28 RX ADMIN — MECLIZINE HYDROCHLORIDE 25 MG: 25 TABLET ORAL at 15:44

## 2025-04-28 RX ADMIN — DIPHENHYDRAMINE HYDROCHLORIDE 25 MG: 50 INJECTION INTRAMUSCULAR; INTRAVENOUS at 16:06

## 2025-04-28 RX ADMIN — MORPHINE SULFATE 4 MG: 4 INJECTION INTRAVENOUS at 14:20

## 2025-04-28 RX ADMIN — SODIUM CHLORIDE, PRESERVATIVE FREE 10 ML: 5 INJECTION INTRAVENOUS at 22:06

## 2025-04-28 RX ADMIN — MORPHINE SULFATE 4 MG: 4 INJECTION INTRAVENOUS at 21:56

## 2025-04-28 RX ADMIN — SODIUM CHLORIDE: 0.9 INJECTION, SOLUTION INTRAVENOUS at 21:55

## 2025-04-28 RX ADMIN — FLUORESCEIN SODIUM 1 MG: 1 STRIP OPHTHALMIC at 13:23

## 2025-04-28 ASSESSMENT — VISUAL ACUITY
OU: 20/25
OS: 20/25
OU: 1
OD: 20/30

## 2025-04-28 ASSESSMENT — PAIN DESCRIPTION - LOCATION
LOCATION: EYE;HEAD
LOCATION: HEAD;FACE
LOCATION: EYE;HEAD
LOCATION: HEAD;FACE
LOCATION: EYE;HEAD

## 2025-04-28 ASSESSMENT — PAIN DESCRIPTION - DESCRIPTORS
DESCRIPTORS: THROBBING
DESCRIPTORS: THROBBING
DESCRIPTORS: THROBBING;JABBING
DESCRIPTORS: THROBBING

## 2025-04-28 ASSESSMENT — TONOMETRY
OD_IOP_MMHG: 22
IOP_HANDHELD: 1
OS_IOP_MMHG: 18

## 2025-04-28 ASSESSMENT — PAIN DESCRIPTION - PAIN TYPE: TYPE: ACUTE PAIN

## 2025-04-28 ASSESSMENT — PAIN SCALES - GENERAL
PAINLEVEL_OUTOF10: 10
PAINLEVEL_OUTOF10: 5
PAINLEVEL_OUTOF10: 9
PAINLEVEL_OUTOF10: 10
PAINLEVEL_OUTOF10: 6
PAINLEVEL_OUTOF10: 10

## 2025-04-28 ASSESSMENT — PAIN - FUNCTIONAL ASSESSMENT
PAIN_FUNCTIONAL_ASSESSMENT: 0-10
PAIN_FUNCTIONAL_ASSESSMENT: PREVENTS OR INTERFERES WITH MANY ACTIVE NOT PASSIVE ACTIVITIES

## 2025-04-28 ASSESSMENT — PAIN DESCRIPTION - ORIENTATION
ORIENTATION: RIGHT

## 2025-04-28 ASSESSMENT — LIFESTYLE VARIABLES
HOW MANY STANDARD DRINKS CONTAINING ALCOHOL DO YOU HAVE ON A TYPICAL DAY: PATIENT DOES NOT DRINK
HOW OFTEN DO YOU HAVE A DRINK CONTAINING ALCOHOL: NEVER

## 2025-04-28 NOTE — ED NOTES
TRANSFER - OUT REPORT:    Verbal report given to MARCUS Dunn on Nita Chen  being transferred to [facility] room 211 for routine progression of patient care       Report consisted of patient's Situation, Background, Assessment and   Recommendations(SBAR).     Information from the following report(s) Nurse Handoff Report, Index, ED Encounter Summary, ED SBAR, Adult Overview, MAR, and Recent Results was reviewed with the receiving nurse.    [place] Fall Assessment:    Presents to emergency department  because of falls (Syncope, seizure, or loss of consciousness): No  Age > 70: Yes  Altered Mental Status, Intoxication with alcohol or substance confusion (Disorientation, impaired judgment, poor safety awaremess, or inability to follow instructions): No  Impaired Mobility: Ambulates or transfers with assistive devices or assistance; Unable to ambulate or transer.: Yes  Nursing Judgement: Yes          Lines:   Peripheral IV 04/28/25 Left Antecubital (Active)   Site Assessment Clean, dry & intact 04/28/25 1301   Line Status Blood return noted;Brisk blood return;Flushed;Specimen collected 04/28/25 1301   Phlebitis Assessment No symptoms 04/28/25 1301   Infiltration Assessment 0 04/28/25 1301   Dressing Status New dressing applied 04/28/25 1301        Opportunity for questions and clarification was provided.      Patient transported with:

## 2025-04-28 NOTE — ED PROVIDER NOTES
Emergency Department Provider Signout / Continuation of Care Note         DISPOSITION Decision To Transfer 04/28/2025 05:21:46 PM       ICD-10-CM    1. Right-sided face pain  R51.9       2. Nausea and vomiting, unspecified vomiting type  R11.2       3. Intractable headache, unspecified chronicity pattern, unspecified headache type  R51.9           The patient's care was signed out to me at shift change.      Final Plan      Assumed care from Dr. Peralta at shift change  We reviewed initial lab work which was unremarkable including inflammatory markers  Patient remedicated and CT maxillofacial obtained  No acute findings on CT  Patient reports that she had minimal improvement from Compazine and Benadryl and persistent her nausea and headache  Given her unusual symptoms, we will go ahead and transfer the patient for admission  I reviewed findings with hospitalist at the [facility] and they have agreed to accept the patient on [facility] for further care and management  Jose G Hamilton MD  04/28/25 9982

## 2025-04-28 NOTE — H&P
Hospitalist History and Physical   Admit Date:  2025  6:52 PM   Name:  Nita Chen   Age:  69 y.o.  Sex:  female  :  1955   MRN:  [id number]   Room:      Presenting/Chief Complaint: No chief complaint on file.     Reason(s) for Admission: Intractable cluster headache [G44.001]     History of Present Illness:   Nita Chen is a 69 y.o. female with a past medical history of morbid obesity, asthma, hypertension, hyperlipidemia, remote history of migraine headaches, osteoarthritis who presented to the emergency room complaining that her headache brought her to the emergency room in the first place on the  had never gone away.  She has been suffering with a right-sided supraorbital sharp & pressure-like headache,  not associated with any vision changes, + assoc. with nausea and some vomiting and occasionally feeling dizzy. She denies any F/C/S, she denies any URI symptoms currently, but a week or 2 prior to onset of the headache patient had sinus and chest congestion symptoms and wondered whether she had a sinus infection but that pressure sensation and drainage cleared before her headache symptoms started.  Review of her chart from the admission on the  revealed that at that point in time in addition to her headache she had complained of some cough and congestion and was short of breath and wheezing.  When they tried to get her up to discharge her she felt her ambulation test and dropped her O2 sat into the 80s she was admitted because of this hypoxia respiratory distress not really because of the headache symptoms.  Family reports that during the hospitalization what ever intravenous medication she was getting for her headache did help improve her headache significantly.  The tramadol that she was discharged home with did not have determined that they believe she is actually allergic to it and she has not been taking it.  Emergency room evaluation today revealed  atraumatic  Eyes:  Sclerae appear normal.  Pupils equally round.  EOMI.  Patient is 20 amatory at [place] ED with the hand-held audiometer.  Right eye pressure 22 left eye pressure of 18 per the ED providers report  ENT:  Nares appear normal.  Moist oral mucosa  Neck:  No restricted ROM.  Trachea midline   CV:   RRR.  No m/r/g.  No jugular venous distension.  Lungs:   CTAB.  No wheezing, rhonchi, or rales.  Symmetric expansion.  Abdomen:   Soft, nontender, nondistended.  Extremities: No cyanosis or clubbing.  No edema  Skin:     No rashes.  Normal coloration.   Warm and dry.    Neuro:  CN II-XII grossly intact.  Sensation intact to light touch.  Moves all 4 extremities equally with symmetrical strength 4+/5  Psych:  Flat mood and affect.      Orders Placed This Encounter   Medications    albuterol (PROVENTIL) (2.5 MG/3ML) 0.083% nebulizer solution 2.5 mg     Initiate RT Bronchodilator Protocol:   Yes - Inpatient Protocol    cefTRIAXone (ROCEPHIN) 1,000 mg in sterile water 10 mL IV syringe     Antimicrobial Indications:   Urinary Tract Infection     UTI duration of therapy:   5 days    enalaprilat (VASOTEC) injection 1.25 mg     Hold for renal dysfunction    metoclopramide (REGLAN) injection 10 mg    sodium chloride flush 0.9 % injection 5-40 mL    sodium chloride flush 0.9 % injection 5-40 mL    0.9 % sodium chloride infusion    OR Linked Order Group     potassium chloride (KLOR-CON M) extended release tablet 40 mEq     potassium bicarb-citric acid (EFFER-K) effervescent tablet 40 mEq     potassium chloride 10 mEq/100 mL IVPB (Peripheral Line)    magnesium sulfate 2000 mg in 50 mL IVPB premix    enoxaparin Sodium (LOVENOX) injection 30 mg     Indication of Use:   Prophylaxis-DVT/PE    OR Linked Order Group     ondansetron (ZOFRAN-ODT) disintegrating tablet 4 mg     ondansetron (ZOFRAN) injection 4 mg    OR Linked Order Group     acetaminophen (TYLENOL) tablet 650 mg     acetaminophen (TYLENOL) suppository 650

## 2025-04-28 NOTE — ED PROVIDER NOTES
Emergency Department Provider Note       [facility] EMERGENCY DEPT   PCP: Kaye Perez MD   Age: 69 y.o.   Sex: female     DISPOSITION      ICD-10-CM    1. Right-sided face pain  R51.9           Medical Decision Making     3:02 PM EDT  Inflammatory markers are negative I have low suspicion for giant cell arteritis, extraocular pressures are normal, low suspicion for acute angle-closure glaucoma.    Patient is feeling improved after ministration morphine but still continued to have 6 out of 10 pain.  Patient did have a noncontrast CT head that was unremarkable 4 days ago.  At this time we will obtain a CT face to rule out possible sinusitis or abnormality with orbits.    3:06 PM EDT The patient's care will be transitioned to Dr. Alejandra.  At this time, the plan is follow up on results of CT face and dispo.  Please see that provider's documentation for further information.         1 acute illness with systemic symptoms.  Shared medical decision making was utilized in creating the patients health plan today.  I independently ordered and reviewed each unique test.    I reviewed external records: provider visit note from PCP.  I reviewed external records: previous lab results from outside ED.  I reviewed external records: previous imaging study including radiologist interpretation.     ED cardiac monitoring rhythm strip was ordered and interpreted:  sinus rhythm, no evidence of an arrhythmia  ST Segments:Normal ST segments - NO STEMI   Rate: 70    ED provider's independent EKG interpretation sinus rhythm, no ischemic changes, right bundle present            History     69-year-old female presenting to the emergency department complaining of pain superior to her right eye.  She denies change in vision.  She states that the pain is sometimes worse with bright lights.  She denies history of similar symptoms in the past.  She does note some pain on her temple.  She is complaining of headache.  No nausea or vomiting.  No

## 2025-04-28 NOTE — ED TRIAGE NOTES
Pt here w/ son, was seen last week and admitted for the same, reports has pressure behind her eyes, dizzy, and n/v, report had meds switched when she was admitted and d/c'd.  Reports never did feel better, reports was d/c'd feeling the same.  Reports just has been able to keep anything down, pt has not been able to keep her meds down since d/c'd

## 2025-04-28 NOTE — PROGRESS NOTES
Writer communication with Dr. Walker  Updating you on the arrival of this patient from [place] ED. Pt is reporting pain 10 behind R eye. No visual disturbances reported.

## 2025-04-29 ENCOUNTER — APPOINTMENT (OUTPATIENT)
Dept: MRI IMAGING | Age: 70
DRG: 103 | End: 2025-04-29
Attending: HOSPITALIST
Payer: MEDICARE

## 2025-04-29 LAB
ANION GAP SERPL CALC-SCNC: 9 MMOL/L (ref 7–16)
BASOPHILS # BLD: 0.03 K/UL (ref 0–0.2)
BASOPHILS NFR BLD: 0.4 % (ref 0–2)
BUN SERPL-MCNC: 9 MG/DL (ref 8–23)
CALCIUM SERPL-MCNC: 8.7 MG/DL (ref 8.8–10.2)
CHLORIDE SERPL-SCNC: 104 MMOL/L (ref 98–107)
CO2 SERPL-SCNC: 26 MMOL/L (ref 20–29)
CREAT SERPL-MCNC: 0.75 MG/DL (ref 0.6–1.1)
DIFFERENTIAL METHOD BLD: ABNORMAL
EOSINOPHIL # BLD: 0.1 K/UL (ref 0–0.8)
EOSINOPHIL NFR BLD: 1.4 % (ref 0.5–7.8)
ERYTHROCYTE [DISTWIDTH] IN BLOOD BY AUTOMATED COUNT: 13.2 % (ref 11.9–14.6)
GLUCOSE SERPL-MCNC: 94 MG/DL (ref 70–99)
HCT VFR BLD AUTO: 41.5 % (ref 35.8–46.3)
HGB BLD-MCNC: 12.7 G/DL (ref 11.7–15.4)
IMM GRANULOCYTES # BLD AUTO: 0.03 K/UL (ref 0–0.5)
IMM GRANULOCYTES NFR BLD AUTO: 0.4 % (ref 0–5)
LYMPHOCYTES # BLD: 1.82 K/UL (ref 0.5–4.6)
LYMPHOCYTES NFR BLD: 25.3 % (ref 13–44)
MCH RBC QN AUTO: 27.9 PG (ref 26.1–32.9)
MCHC RBC AUTO-ENTMCNC: 30.6 G/DL (ref 31.4–35)
MCV RBC AUTO: 91.2 FL (ref 82–102)
MONOCYTES # BLD: 0.83 K/UL (ref 0.1–1.3)
MONOCYTES NFR BLD: 11.5 % (ref 4–12)
NEUTS SEG # BLD: 4.38 K/UL (ref 1.7–8.2)
NEUTS SEG NFR BLD: 61 % (ref 43–78)
NRBC # BLD: 0 K/UL (ref 0–0.2)
PLATELET # BLD AUTO: 250 K/UL (ref 150–450)
PMV BLD AUTO: 10.3 FL (ref 9.4–12.3)
POTASSIUM SERPL-SCNC: 4 MMOL/L (ref 3.5–5.1)
RBC # BLD AUTO: 4.55 M/UL (ref 4.05–5.2)
SODIUM SERPL-SCNC: 139 MMOL/L (ref 136–145)
WBC # BLD AUTO: 7.2 K/UL (ref 4.3–11.1)

## 2025-04-29 PROCEDURE — 85025 COMPLETE CBC W/AUTO DIFF WBC: CPT

## 2025-04-29 PROCEDURE — 6360000002 HC RX W HCPCS: Performed by: FAMILY MEDICINE

## 2025-04-29 PROCEDURE — 2580000003 HC RX 258: Performed by: FAMILY MEDICINE

## 2025-04-29 PROCEDURE — 2500000003 HC RX 250 WO HCPCS: Performed by: FAMILY MEDICINE

## 2025-04-29 PROCEDURE — 1100000003 HC PRIVATE W/ TELEMETRY

## 2025-04-29 PROCEDURE — 70551 MRI BRAIN STEM W/O DYE: CPT

## 2025-04-29 PROCEDURE — 80048 BASIC METABOLIC PNL TOTAL CA: CPT

## 2025-04-29 PROCEDURE — 36415 COLL VENOUS BLD VENIPUNCTURE: CPT

## 2025-04-29 PROCEDURE — 6360000002 HC RX W HCPCS: Performed by: INTERNAL MEDICINE

## 2025-04-29 RX ORDER — TRAMADOL HYDROCHLORIDE 50 MG/1
50 TABLET ORAL EVERY 6 HOURS PRN
Status: DISCONTINUED | OUTPATIENT
Start: 2025-04-29 | End: 2025-04-30

## 2025-04-29 RX ORDER — METOCLOPRAMIDE HYDROCHLORIDE 5 MG/ML
10 INJECTION INTRAMUSCULAR; INTRAVENOUS EVERY 6 HOURS PRN
Status: DISCONTINUED | OUTPATIENT
Start: 2025-04-29 | End: 2025-05-02 | Stop reason: HOSPADM

## 2025-04-29 RX ORDER — MORPHINE SULFATE 4 MG/ML
4 INJECTION, SOLUTION INTRAMUSCULAR; INTRAVENOUS EVERY 4 HOURS PRN
Status: DISCONTINUED | OUTPATIENT
Start: 2025-04-29 | End: 2025-04-30

## 2025-04-29 RX ADMIN — ENALAPRILAT 1.25 MG: 2.5 INJECTION INTRAVENOUS at 21:19

## 2025-04-29 RX ADMIN — MORPHINE SULFATE 4 MG: 4 INJECTION INTRAVENOUS at 21:35

## 2025-04-29 RX ADMIN — MORPHINE SULFATE 4 MG: 4 INJECTION INTRAVENOUS at 03:52

## 2025-04-29 RX ADMIN — ENALAPRILAT 1.25 MG: 2.5 INJECTION INTRAVENOUS at 03:53

## 2025-04-29 RX ADMIN — ONDANSETRON 4 MG: 2 INJECTION, SOLUTION INTRAMUSCULAR; INTRAVENOUS at 21:35

## 2025-04-29 RX ADMIN — LORAZEPAM 1 MG: 2 INJECTION INTRAMUSCULAR; INTRAVENOUS at 09:53

## 2025-04-29 RX ADMIN — ENOXAPARIN SODIUM 30 MG: 100 INJECTION SUBCUTANEOUS at 21:19

## 2025-04-29 RX ADMIN — SODIUM CHLORIDE, PRESERVATIVE FREE 10 ML: 5 INJECTION INTRAVENOUS at 07:56

## 2025-04-29 RX ADMIN — MORPHINE SULFATE 4 MG: 4 INJECTION INTRAVENOUS at 17:34

## 2025-04-29 RX ADMIN — ENALAPRILAT 1.25 MG: 2.5 INJECTION INTRAVENOUS at 16:02

## 2025-04-29 RX ADMIN — ENALAPRILAT 1.25 MG: 2.5 INJECTION INTRAVENOUS at 11:52

## 2025-04-29 RX ADMIN — SODIUM CHLORIDE, PRESERVATIVE FREE 40 MG: 5 INJECTION INTRAVENOUS at 07:56

## 2025-04-29 RX ADMIN — ENOXAPARIN SODIUM 30 MG: 100 INJECTION SUBCUTANEOUS at 07:56

## 2025-04-29 RX ADMIN — MORPHINE SULFATE 4 MG: 4 INJECTION INTRAVENOUS at 07:58

## 2025-04-29 RX ADMIN — SODIUM CHLORIDE, PRESERVATIVE FREE 10 ML: 5 INJECTION INTRAVENOUS at 21:19

## 2025-04-29 RX ADMIN — ONDANSETRON 4 MG: 2 INJECTION, SOLUTION INTRAMUSCULAR; INTRAVENOUS at 07:59

## 2025-04-29 RX ADMIN — WATER 1000 MG: 1 INJECTION INTRAMUSCULAR; INTRAVENOUS; SUBCUTANEOUS at 21:19

## 2025-04-29 ASSESSMENT — PAIN DESCRIPTION - DESCRIPTORS
DESCRIPTORS: THROBBING
DESCRIPTORS: ACHING
DESCRIPTORS: THROBBING
DESCRIPTORS: ACHING;CRUSHING

## 2025-04-29 ASSESSMENT — PAIN DESCRIPTION - ORIENTATION
ORIENTATION: RIGHT
ORIENTATION: ANTERIOR
ORIENTATION: RIGHT
ORIENTATION: ANTERIOR

## 2025-04-29 ASSESSMENT — PAIN SCALES - GENERAL
PAINLEVEL_OUTOF10: 10
PAINLEVEL_OUTOF10: 10
PAINLEVEL_OUTOF10: 8
PAINLEVEL_OUTOF10: 10

## 2025-04-29 ASSESSMENT — PAIN DESCRIPTION - LOCATION
LOCATION: HEAD

## 2025-04-29 NOTE — PROGRESS NOTES
END OF SHIFT NOTE:    INTAKE/OUTPUT  04/28 0701 - 04/29 0700  In: -   Out: 200 [Urine:200]  Voiding: Yes  Catheter: No  Drain:              Flatus: Patient does have flatus present.    Stool:  occurrences.    Characteristics:           Stool Assessment  Last BM (including prior to admit): 04/28/25    Emesis:  occurrences.    Characteristics:        VITAL SIGNS  Patient Vitals for the past 12 hrs:   Temp Pulse Resp BP SpO2   04/29/25 0244 98.4 °F (36.9 °C) 72 16 135/89 95 %   04/28/25 2240 98.8 °F (37.1 °C) 68 16 130/72 95 %   04/28/25 2057 -- -- -- -- 97 %       Pain Assessment  Pain Level: 8 (04/29/25 0352)  Pain Location: Head  Patient's Stated Pain Goal: 0 - No pain    Ambulating  Yes    Shift report given to oncoming nurse at the bedside.    Kenna Brito RN

## 2025-04-29 NOTE — PROGRESS NOTES
TRANSFER - IN REPORT:    Verbal report received from Jennie VELAZQUEZ on Nita Chen  being received from [place] ED for routine progression of patient care      Report consisted of patient's Situation, Background, Assessment and   Recommendations(SBAR).     Information from the following report(s) Nurse Handoff Report, ED SBAR, and Adult Overview was reviewed with the receiving nurse.    Opportunity for questions and clarification was provided.      Assessment completed upon patient's arrival to unit and care assumed.

## 2025-04-29 NOTE — ACP (ADVANCE CARE PLANNING)
Advance Care Planning   Healthcare Decision Maker:    Primary Decision Maker: Grey Maciel - Child - [phone]    Click here to complete Healthcare Decision Makers including selection of the Healthcare Decision Maker Relationship (ie \"Primary\").

## 2025-04-29 NOTE — PLAN OF CARE
Problem: Discharge Planning  Goal: Discharge to home or other facility with appropriate resources  Outcome: Not Progressing     Problem: Pain  Goal: Verbalizes/displays adequate comfort level or baseline comfort level  Outcome: Not Progressing     Problem: Safety - Adult  Goal: Free from fall injury  Outcome: Not Progressing

## 2025-04-29 NOTE — CARE COORDINATION
RNCM met with patient and daughter in room 211 to discuss discharge planning.    Patient lives with daughter in a one level home with 4 steps for entry. Patient completes ADLs independently at baseline and ambulates without assistive devices. Patient has no current home care services. Demographics and PCP verified. CM following for discharge needs.       04/29/25 1128   Service Assessment   Patient Orientation Alert and Oriented   Cognition Alert   History Provided By Child/Family   Primary Caregiver Self   Accompanied By/Relationship Daughter: Consandra   Support Systems Children   Patient's Healthcare Decision Maker is: Legal Next of Kin   PCP Verified by CM Yes   Last Visit to PCP Within last 3 months   Prior Functional Level Independent in ADLs/IADLs   Current Functional Level Independent in ADLs/IADLs   Can patient return to prior living arrangement Yes   Ability to make needs known: Good   Family able to assist with home care needs: Yes   Would you like for me to discuss the discharge plan with any other family members/significant others, and if so, who? Yes  (Children)   Financial Resources Medicare   Community Resources None   Social/Functional History   Lives With Daughter   Type of Home House   Home Layout One level   Home Access Stairs to enter with rails   Entrance Stairs - Number of Steps 4   Bathroom Equipment None   Home Equipment None   Receives Help From Family   Prior Level of Assist for ADLs Independent   Ambulation Assistance Independent   Prior Level of Assist for Transfers Independent   Active  Yes   Occupation Retired   Discharge Planning   Type of Residence Trailer/Mobile Home   Living Arrangements Children   Current Services Prior To Admission None   DME Ordered? No   Potential Assistance Purchasing Medications No   Type of Home Care Services None   Patient expects to be discharged to: [place]   History of falls? 0

## 2025-04-29 NOTE — PROGRESS NOTES
Hospitalist Progress Note   Admit Date:  2025  6:52 PM   Name:  Nita Chen   Age:  69 y.o.  Sex:  female  :  1955   MRN:  [id number]   Room:      Presenting/Chief Complaint: No chief complaint on file.     Reason(s) for Admission: Intractable cluster headache [G44.001]     Hospital Course:     Nita Chen is a 69 y.o. female with medical history of :    -BMI 40  -asthma  -HTN  -HLP  -migraines in 20s   -OA     Admitted with persistent headache     S/p negative CT head and sinuses  Sed rate and CRP ok    Had recent UTI  Did not fill antibiotics   She is on rocephin   Recent urine culture  group b strep     MRI brain pending   Neurology consulted     She has had nausea/ emesis and is on clear liquids       Discharge plans pending to home      Subjective & 24hr Events:     Updated daughter bedside   Desats per RN, sleeping, placed on 2 L NC  Not short of breath, no cough   Not using CPAP   Has not been Hungry  Currently headache resolved     Assessment & Plan:     Principal Problem:    Intractable cluster headache  Plan:   25  Pending MRI brain and neurology consult       Active Problems:    Uncontrolled hypertension  Plan:   25  Currently on IV Vasotec as opposed to home oral med due to recent nausea           Bilious vomiting with nausea  Plan:   25  Clear liquids to advance as able- she is willing to try now           Acute cystitis  Plan:   D2 rocephin   urine culture group b strep noted         JANET:  Acute hypoxia  Asthma:  25  Desats with sleeping   O2 as needed  Will need JANET followup      Anticipated Discharge Arrangements:   Home    PT/OT evals ordered?  Not ordered; patient not expected to need rehab  Diet:  ADULT DIET; Clear Liquid; Low Fat/Low Chol/High Fiber/2 gm Na  VTE prophylaxis: Lovenox  Code status: Full Code      Non-peripheral Lines and Tubes (if present):          Telemetry (if present):  Cardiac/Telemetry Monitor On:  Portable telemetry pack applied        Hospital Problems:  Principal Problem:    Intractable cluster headache  Active Problems:    Uncontrolled hypertension    Acute intractable headache    Bilious vomiting with nausea    Acute cystitis  Resolved Problems:    * No resolved hospital problems. *      Objective:   Patient Vitals for the past 24 hrs:   Temp Pulse Resp BP SpO2   04/29/25 0758 -- -- 20 -- --   04/29/25 0717 98.2 °F (36.8 °C) 66 18 (!) 160/80 91 %   04/29/25 0244 98.4 °F (36.9 °C) 72 16 135/89 95 %   04/28/25 2240 98.8 °F (37.1 °C) 68 16 130/72 95 %   04/28/25 2057 -- -- -- -- 97 %   04/28/25 1900 98.6 °F (37 °C) 70 16 (!) 173/83 97 %       Oxygen Therapy  SpO2: 91 %  O2 Device: None (Room air)    Estimated body mass index is 40.28 kg/m² as calculated from the following:    Height as of an earlier encounter on 4/28/25: 1.702 m (5' 7\").    Weight as of this encounter: 116.7 kg (257 lb 3.2 oz).    Intake/Output Summary (Last 24 hours) at 4/29/2025 1122  Last data filed at 4/29/2025 0716  Gross per 24 hour   Intake 700.9 ml   Output 200 ml   Net 500.9 ml         Physical Exam:     General:    Well nourished. Sleepy, no distress    CV:   RRR.  No m/r/g.  No jugular venous distension. No edema   Lungs:   CTAB.  No wheezing, rhonchi, or rales.  Symmetric expansion. Anterior   Abdomen:   Soft, nontender, nondistended.  Extremities: No cyanosis or clubbing.  No edema  Skin:     No rashes.  Normal coloration.   Warm and dry.    Neuro:  grossly intact.    Psych:  Normal mood and affect.      I have personally reviewed labs and tests:  Recent Labs:  Recent Results (from the past 48 hours)   EKG 12 Lead    Collection Time: 04/28/25 12:46 PM   Result Value Ref Range    Ventricular Rate 67 BPM    Atrial Rate 67 BPM    P-R Interval 140 ms    QRS Duration 118 ms    Q-T Interval 430 ms    QTc Calculation (Bazett) 454 ms    P Axis 64 degrees    R Axis -54 degrees    T Axis 23 degrees    Diagnosis       Normal sinus

## 2025-04-29 NOTE — PROGRESS NOTES
4 Eyes Skin Assessment     NAME:  Nita Chen  YOB: 1955  MEDICAL RECORD NUMBER:  [id number]    The patient is being assessed for  Admission    I agree that at least one RN has performed a thorough Head to Toe Skin Assessment on the patient. ALL assessment sites listed below have been assessed.      Areas assessed by both nurses:    Head, Face, Ears, Shoulders, Back, Chest, Arms, Elbows, Hands, Sacrum. Buttock, Coccyx, Ischium, Legs. Feet and Heels, and Under Medical Devices         Does the Patient have a Wound? No noted wound(s)       Kin Prevention initiated by RN: Yes  Wound Care Orders initiated by RN: No    Pressure Injury (Stage 3,4, Unstageable, DTI, NWPT, and Complex wounds) if present, place Wound referral order by RN under : No    New Ostomies, if present place, Ostomy referral order under : No     Nurse 1 eSignature: Electronically signed by Kenna Brito RN on 4/28/25 at 8:48 PM EDT    **SHARE this note so that the co-signing nurse can place an eSignature**    Nurse 2 eSignature: Electronically signed by Mona Carranza RN on 4/29/25 at 10:15 AM EDT

## 2025-04-30 PROCEDURE — 6370000000 HC RX 637 (ALT 250 FOR IP): Performed by: STUDENT IN AN ORGANIZED HEALTH CARE EDUCATION/TRAINING PROGRAM

## 2025-04-30 PROCEDURE — 1100000000 HC RM PRIVATE

## 2025-04-30 PROCEDURE — 6370000000 HC RX 637 (ALT 250 FOR IP): Performed by: NURSE PRACTITIONER

## 2025-04-30 PROCEDURE — 6360000002 HC RX W HCPCS: Performed by: INTERNAL MEDICINE

## 2025-04-30 PROCEDURE — 99222 1ST HOSP IP/OBS MODERATE 55: CPT | Performed by: STUDENT IN AN ORGANIZED HEALTH CARE EDUCATION/TRAINING PROGRAM

## 2025-04-30 PROCEDURE — 6360000002 HC RX W HCPCS: Performed by: FAMILY MEDICINE

## 2025-04-30 PROCEDURE — 2580000003 HC RX 258: Performed by: INTERNAL MEDICINE

## 2025-04-30 PROCEDURE — 2500000003 HC RX 250 WO HCPCS: Performed by: FAMILY MEDICINE

## 2025-04-30 RX ORDER — INDOMETHACIN 50 MG/1
100 CAPSULE ORAL
Status: DISCONTINUED | OUTPATIENT
Start: 2025-04-30 | End: 2025-04-30 | Stop reason: SDUPTHER

## 2025-04-30 RX ORDER — OXYCODONE HYDROCHLORIDE 5 MG/1
5 TABLET ORAL EVERY 6 HOURS PRN
Refills: 0 | Status: DISCONTINUED | OUTPATIENT
Start: 2025-04-30 | End: 2025-04-30

## 2025-04-30 RX ORDER — INDOMETHACIN 50 MG/1
100 CAPSULE ORAL EVERY 4 HOURS PRN
Status: DISCONTINUED | OUTPATIENT
Start: 2025-04-30 | End: 2025-04-30 | Stop reason: DRUGHIGH

## 2025-04-30 RX ORDER — SODIUM CHLORIDE 9 MG/ML
INJECTION, SOLUTION INTRAVENOUS CONTINUOUS
Status: DISCONTINUED | OUTPATIENT
Start: 2025-04-30 | End: 2025-05-01

## 2025-04-30 RX ADMIN — METOCLOPRAMIDE 10 MG: 5 INJECTION, SOLUTION INTRAMUSCULAR; INTRAVENOUS at 21:31

## 2025-04-30 RX ADMIN — ONDANSETRON 4 MG: 2 INJECTION, SOLUTION INTRAMUSCULAR; INTRAVENOUS at 18:32

## 2025-04-30 RX ADMIN — ENALAPRILAT 1.25 MG: 2.5 INJECTION INTRAVENOUS at 05:16

## 2025-04-30 RX ADMIN — INDOMETHACIN 75 MG: 50 CAPSULE ORAL at 13:09

## 2025-04-30 RX ADMIN — ENALAPRILAT 1.25 MG: 2.5 INJECTION INTRAVENOUS at 09:53

## 2025-04-30 RX ADMIN — SODIUM CHLORIDE, PRESERVATIVE FREE 10 ML: 5 INJECTION INTRAVENOUS at 20:45

## 2025-04-30 RX ADMIN — WATER 1000 MG: 1 INJECTION INTRAMUSCULAR; INTRAVENOUS; SUBCUTANEOUS at 20:44

## 2025-04-30 RX ADMIN — INDOMETHACIN 75 MG: 50 CAPSULE ORAL at 15:53

## 2025-04-30 RX ADMIN — ENOXAPARIN SODIUM 30 MG: 100 INJECTION SUBCUTANEOUS at 07:38

## 2025-04-30 RX ADMIN — MORPHINE SULFATE 4 MG: 4 INJECTION INTRAVENOUS at 07:46

## 2025-04-30 RX ADMIN — ENALAPRILAT 1.25 MG: 2.5 INJECTION INTRAVENOUS at 20:44

## 2025-04-30 RX ADMIN — OXYCODONE HYDROCHLORIDE 5 MG: 5 TABLET ORAL at 01:02

## 2025-04-30 RX ADMIN — ENALAPRILAT 1.25 MG: 2.5 INJECTION INTRAVENOUS at 15:57

## 2025-04-30 RX ADMIN — ENOXAPARIN SODIUM 30 MG: 100 INJECTION SUBCUTANEOUS at 20:44

## 2025-04-30 RX ADMIN — SODIUM CHLORIDE: 0.9 INJECTION, SOLUTION INTRAVENOUS at 14:33

## 2025-04-30 RX ADMIN — SODIUM CHLORIDE, PRESERVATIVE FREE 40 MG: 5 INJECTION INTRAVENOUS at 07:58

## 2025-04-30 RX ADMIN — Medication 6 MG: at 20:44

## 2025-04-30 RX ADMIN — ONDANSETRON 4 MG: 2 INJECTION, SOLUTION INTRAMUSCULAR; INTRAVENOUS at 07:39

## 2025-04-30 RX ADMIN — SODIUM CHLORIDE, PRESERVATIVE FREE 10 ML: 5 INJECTION INTRAVENOUS at 07:59

## 2025-04-30 ASSESSMENT — PAIN SCALES - GENERAL
PAINLEVEL_OUTOF10: 9
PAINLEVEL_OUTOF10: 4
PAINLEVEL_OUTOF10: 9

## 2025-04-30 ASSESSMENT — PAIN DESCRIPTION - DESCRIPTORS
DESCRIPTORS: ACHING
DESCRIPTORS: ACHING;THROBBING
DESCRIPTORS: ACHING

## 2025-04-30 ASSESSMENT — PAIN DESCRIPTION - ORIENTATION
ORIENTATION: ANTERIOR
ORIENTATION: POSTERIOR

## 2025-04-30 ASSESSMENT — PAIN DESCRIPTION - LOCATION
LOCATION: HEAD

## 2025-04-30 NOTE — PLAN OF CARE
Problem: Discharge Planning  Goal: Discharge to home or other facility with appropriate resources  4/30/2025 1214 by Rosa Isela Foster RN  Outcome: Progressing  4/30/2025 0154 by Aida Landa RN  Outcome: Progressing     Problem: Pain  Goal: Verbalizes/displays adequate comfort level or baseline comfort level  4/30/2025 1214 by Rosa Isela Foster RN  Outcome: Progressing  4/30/2025 0154 by Aida Landa, RN  Outcome: Progressing     Problem: Safety - Adult  Goal: Free from fall injury  4/30/2025 1214 by Rosa Isela Foster RN  Outcome: Progressing  4/30/2025 0154 by Aida Landa, RN  Outcome: Progressing

## 2025-04-30 NOTE — CONSULTS
Neurology Consult Note       History:   69-year-old female presents with worsening headache.  Started about 10 days ago involving right orbital and periorbital region with associated redness, tearing, discharge and other allergic type symptoms.  The pain is episodic and severe lasting 2 to 3 minutes on average, before subsiding.  It is only on the right side. Migrainous features are also present including sensitivity to lights and sounds, nausea.  Denies pain with chewing.      Exam: Pertinent positives and negatives include:  In distress, with warm hand towel over the right eye.  No obvious ptosis or pupillary asymmetries.  There is some tenderness to palpation over the right periorbital region.  Remainder of exam unrevealing, nonfocal.     Imaging and review of data:   MRI brain is unremarkable      Assessment and Plan:   69-year-old female with possible paroxysmal hemicrania.  This is an indomethacin-responsive headache, and a proper treatment trial will ensue.      Plan:  Indomethacin 100 mg 3 times daily with meals, with every 4 hours as needed dosing for breakthrough pain. PPI for GI protection.     Please avoid opiates and/or Fioricet, which may contribute to rebound headaches.      Jag Hedrick,   Neurology      Cumulative time spent today was 45 minutes which included chart review, obtaining history (from patient, family, or other providers), review of images, examining the patient, and counseling the patient and/or family on medical condition.

## 2025-04-30 NOTE — PROGRESS NOTES
Hospitalist Progress Note   Admit Date:  2025  6:52 PM   Name:  Nita Chen   Age:  69 y.o.  Sex:  female  :  1955   MRN:  [id number]   Room:      Presenting/Chief Complaint: No chief complaint on file.     Reason(s) for Admission: Intractable cluster headache [G44.001]     Hospital Course:     Nita Chen is a 69 y.o. female with medical history of :    -BMI 40  -asthma  -HTN  -HLP  -migraines in 20s   -OA     Admitted with persistent headache     S/p negative CT head and sinuses  Sed rate and CRP ok    Had recent UTI  Did not fill antibiotics   She is on rocephin   Recent urine culture  group b strep     MRI brain \"    Findings suggest extensive chronic ischemic demyelinative changes of  the cerebral white matter bilaterally. Chronic ischemic changes are also noted  in the [place], likely on the basis of disease pontine perforators. Otherwise  normal.   \"      Neurology consulted and feels she has possible paroxysmal hemicrania     She has had nausea/ emesis and is on clear liquids     Desats per RN, sleeping, placed on 2 L NC  Has untreated JANET and would like referral at discharge for this and her asthma     Discharge plans pending to home      Subjective & 24hr Events:     Updated daughter bedside   Headache comes and goes  Ongoing nausea/ not eating   Still some hematuria    Assessment & Plan:     Principal Problem:    Intractable cluster headache  Plan:   25  I discussed in person with neurology who is starting indocin trial  Avoid opioids       Active Problems:    Uncontrolled hypertension  Plan:   25  Currently on IV Vasotec as opposed to home oral med due to recent nausea           Bilious vomiting with nausea  Plan:   25  Diet as tolerant  NS 75cc/hr           Acute cystitis  Plan:   25  D3 rocephin   urine culture group b strep noted         JANET:  Acute hypoxia  Asthma:  25  Desats with sleeping   O2 as needed  Will need JANET

## 2025-04-30 NOTE — PLAN OF CARE
Problem: Discharge Planning  Goal: Discharge to home or other facility with appropriate resources  4/30/2025 0154 by Aida Landa RN  Outcome: Progressing  4/29/2025 2058 by Irasema Sullivan RN  Outcome: Progressing     Problem: Pain  Goal: Verbalizes/displays adequate comfort level or baseline comfort level  4/30/2025 0154 by Aida Landa RN  Outcome: Progressing  4/29/2025 2058 by Irasema Sullivan RN  Outcome: Progressing     Problem: Safety - Adult  Goal: Free from fall injury  4/30/2025 0154 by Aida Landa RN  Outcome: Progressing  4/29/2025 2058 by Irasema Sullivan, RN  Outcome: Progressing

## 2025-04-30 NOTE — PROGRESS NOTES
Hourly rounding completed on this shift. No new complaints at this time. All needs met. Pt is currently resting in bed. Will continue to monitor and give report to oncoming nurse.

## 2025-05-01 PROCEDURE — 94760 N-INVAS EAR/PLS OXIMETRY 1: CPT

## 2025-05-01 PROCEDURE — 6370000000 HC RX 637 (ALT 250 FOR IP): Performed by: STUDENT IN AN ORGANIZED HEALTH CARE EDUCATION/TRAINING PROGRAM

## 2025-05-01 PROCEDURE — 2500000003 HC RX 250 WO HCPCS: Performed by: FAMILY MEDICINE

## 2025-05-01 PROCEDURE — 6360000002 HC RX W HCPCS: Performed by: FAMILY MEDICINE

## 2025-05-01 PROCEDURE — 2580000003 HC RX 258: Performed by: FAMILY MEDICINE

## 2025-05-01 PROCEDURE — 6360000002 HC RX W HCPCS: Performed by: INTERNAL MEDICINE

## 2025-05-01 PROCEDURE — 99231 SBSQ HOSP IP/OBS SF/LOW 25: CPT | Performed by: STUDENT IN AN ORGANIZED HEALTH CARE EDUCATION/TRAINING PROGRAM

## 2025-05-01 PROCEDURE — 6370000000 HC RX 637 (ALT 250 FOR IP): Performed by: INTERNAL MEDICINE

## 2025-05-01 PROCEDURE — 94640 AIRWAY INHALATION TREATMENT: CPT

## 2025-05-01 PROCEDURE — 1100000000 HC RM PRIVATE

## 2025-05-01 RX ORDER — ALBUTEROL SULFATE 0.83 MG/ML
2.5 SOLUTION RESPIRATORY (INHALATION) 2 TIMES DAILY
Status: DISCONTINUED | OUTPATIENT
Start: 2025-05-01 | End: 2025-05-02 | Stop reason: HOSPADM

## 2025-05-01 RX ORDER — ZOLPIDEM TARTRATE 5 MG/1
5 TABLET ORAL NIGHTLY PRN
Status: DISCONTINUED | OUTPATIENT
Start: 2025-05-01 | End: 2025-05-02 | Stop reason: HOSPADM

## 2025-05-01 RX ORDER — LISINOPRIL 5 MG/1
5 TABLET ORAL DAILY
Status: DISCONTINUED | OUTPATIENT
Start: 2025-05-02 | End: 2025-05-02 | Stop reason: HOSPADM

## 2025-05-01 RX ADMIN — ENOXAPARIN SODIUM 30 MG: 100 INJECTION SUBCUTANEOUS at 08:39

## 2025-05-01 RX ADMIN — ALBUTEROL SULFATE 2.5 MG: 2.5 SOLUTION RESPIRATORY (INHALATION) at 19:35

## 2025-05-01 RX ADMIN — SODIUM CHLORIDE, PRESERVATIVE FREE 10 ML: 5 INJECTION INTRAVENOUS at 08:39

## 2025-05-01 RX ADMIN — INDOMETHACIN 75 MG: 50 CAPSULE ORAL at 15:52

## 2025-05-01 RX ADMIN — INDOMETHACIN 75 MG: 50 CAPSULE ORAL at 08:38

## 2025-05-01 RX ADMIN — INDOMETHACIN 75 MG: 50 CAPSULE ORAL at 04:36

## 2025-05-01 RX ADMIN — ZOLPIDEM TARTRATE 5 MG: 5 TABLET ORAL at 21:18

## 2025-05-01 RX ADMIN — ENALAPRILAT 1.25 MG: 2.5 INJECTION INTRAVENOUS at 08:44

## 2025-05-01 RX ADMIN — SODIUM CHLORIDE, PRESERVATIVE FREE 10 ML: 5 INJECTION INTRAVENOUS at 21:18

## 2025-05-01 RX ADMIN — INDOMETHACIN 75 MG: 50 CAPSULE ORAL at 17:50

## 2025-05-01 RX ADMIN — WATER 1000 MG: 1 INJECTION INTRAMUSCULAR; INTRAVENOUS; SUBCUTANEOUS at 21:17

## 2025-05-01 RX ADMIN — ONDANSETRON 4 MG: 2 INJECTION, SOLUTION INTRAMUSCULAR; INTRAVENOUS at 04:02

## 2025-05-01 RX ADMIN — ENOXAPARIN SODIUM 30 MG: 100 INJECTION SUBCUTANEOUS at 21:17

## 2025-05-01 RX ADMIN — SODIUM CHLORIDE, PRESERVATIVE FREE 40 MG: 5 INJECTION INTRAVENOUS at 08:38

## 2025-05-01 RX ADMIN — ENALAPRILAT 1.25 MG: 2.5 INJECTION INTRAVENOUS at 04:01

## 2025-05-01 RX ADMIN — ALBUTEROL SULFATE 2.5 MG: 2.5 SOLUTION RESPIRATORY (INHALATION) at 15:14

## 2025-05-01 RX ADMIN — INDOMETHACIN 75 MG: 50 CAPSULE ORAL at 11:09

## 2025-05-01 ASSESSMENT — PAIN DESCRIPTION - ORIENTATION
ORIENTATION: UPPER
ORIENTATION: RIGHT
ORIENTATION: ANTERIOR

## 2025-05-01 ASSESSMENT — PAIN DESCRIPTION - DESCRIPTORS
DESCRIPTORS: ACHING;POUNDING
DESCRIPTORS: ACHING
DESCRIPTORS: HEAVINESS

## 2025-05-01 ASSESSMENT — PAIN DESCRIPTION - LOCATION
LOCATION: HEAD

## 2025-05-01 ASSESSMENT — PAIN - FUNCTIONAL ASSESSMENT
PAIN_FUNCTIONAL_ASSESSMENT: ACTIVITIES ARE NOT PREVENTED

## 2025-05-01 ASSESSMENT — PAIN DESCRIPTION - ONSET: ONSET: GRADUAL

## 2025-05-01 ASSESSMENT — PAIN SCALES - GENERAL
PAINLEVEL_OUTOF10: 6
PAINLEVEL_OUTOF10: 3
PAINLEVEL_OUTOF10: 3

## 2025-05-01 ASSESSMENT — PAIN DESCRIPTION - FREQUENCY: FREQUENCY: CONTINUOUS

## 2025-05-01 ASSESSMENT — PAIN DESCRIPTION - PAIN TYPE: TYPE: ACUTE PAIN

## 2025-05-01 NOTE — PROGRESS NOTES
Hospitalist Progress Note   Admit Date:  2025  6:52 PM   Name:  Nita Chen   Age:  69 y.o.  Sex:  female  :  1955   MRN:  [id number]   Room:      Presenting/Chief Complaint: No chief complaint on file.     Reason(s) for Admission: Intractable cluster headache [G44.001]     Hospital Course:     Nita Chen is a 69 y.o. female with medical history of :    -BMI 40  -asthma  -HTN  -HLP  -migraines in 20s   -OA     Admitted with persistent headache     S/p negative CT head and sinuses  Sed rate and CRP ok    Had recent UTI  Did not fill antibiotics   She is on rocephin   Recent urine culture  group b strep     MRI brain \"    Findings suggest extensive chronic ischemic demyelinative changes of  the cerebral white matter bilaterally. Chronic ischemic changes are also noted  in the [place], likely on the basis of disease pontine perforators. Otherwise  normal.   \"      Neurology consulted and feels she has possible paroxysmal hemicrania   She is on indocin trial with some improvement to date     She has had nausea/ emesis and is on clear liquids     Desats per RN, sleeping, placed on 2 L NC  Has untreated JANET and would like referral at discharge for this and her asthma     Discharge plans pending to home      Subjective & 24hr Events:     Updated daughter bedside   Feeling better 30%  Able to eat some  Some shortness of breath, requesting albuterol , cough/ wheezing  Unable to sleep- took ambien prior without issues     Assessment & Plan:     Principal Problem:    Intractable cluster headache  Plan:   25  indocin trial to taper in clinic as able   Avoid opioids       Active Problems:    Uncontrolled hypertension  Plan:   25  Resume lisinopril           Bilious vomiting with nausea  Plan:   25  Diet tolerant  Stop IV fluids           Acute cystitis  Plan:   25  D4 rocephin  - urine culture group b strep noted         JANET:  Acute

## 2025-05-01 NOTE — PROGRESS NOTES
SPIRITUAL HEALTH  Note for Med/Surg Visit                  Room # 211/01    Name: Nita Chen           Age: 69 y.o.    Gender: female          MRN: [id number]  Denominational: Non-Anabaptist       Preferred Language: English    Date: 05/01/25  Visit Time: Begin Time: (P) 1310 End Time : (P) 1315 Complexity of Encounter: (P) Low      Visit Summary:  offered a visit to patient. Patient accepted prayer and expressed gratitude, evincing no further needs at this time.  is available for follow up at patient's request.      Referral/Consult From: (P) Rounding   Encounter Overview/Reason: (P) Spiritual/Emotional Needs  Encounter Code:     Crisis (if applicable):    Service Provided For: (P) Patient and family together     Patient was available.    Yahaira, Belief, Meaning:   Patient identifies as spiritual  Family/Friends identifies as spiritual  Rituals (if applicable)      Importance and Influence:  Patient does not have spiritual/personal beliefs that influence decisions regarding their health  Family/Friends does not have spiritual/personal beliefs that influence decisions regarding the patient's health    Community:  Patient   Support system includes  and (P) Children, Family members   Family/Friends   Support system includes  and (P) Children, Family members     Assessment and Plan of Care:   Emotions Expressed by Patient:   Assessment: (P) Calm    Interventions by :   Intervention: (P) Active listening, Sustaining Presence/Ministry of presence, Prayer (assurance of)/[place]     Result/ Response by Patient:   Outcome: (P) Acceptance, Comfort    Patient Plan of Care:   Plan and Referrals  Plan/Referrals: (P) Continue Support (comment)     Emotions Expressed by Spouse/Family/Friends:   calm     Interventions with Spouse/ Family/Friends include:   active listening, prayer, and provided ministry of presence    Spouse/Family/Friends Plan of Care:   Spiritual care available upon

## 2025-05-01 NOTE — PROGRESS NOTES
Neurology Progress Note       History:   69-year-old female presents with worsening headache.  Started about 10 days ago involving right orbital and periorbital region with associated redness, tearing, discharge and other allergic type symptoms.  The pain is episodic and severe lasting 2 to 3 minutes on average, before subsiding.  It is only on the right side. Migrainous features are also present including sensitivity to lights and sounds, nausea.  Denies pain with chewing.    Interim:  Improving symptoms.     Exam: Pertinent positives and negatives include:  Comfortable appearing. No obvious ptosis or pupillary asymmetries.  There is some tenderness to palpation over the right periorbital region.  Remainder of exam unrevealing, nonfocal.     Imaging and review of data:   MRI brain is unremarkable      Assessment and Plan:   69-year-old female with paroxysmal hemicrania.      Plan:  Continue indomethacin 75 mg 3 times daily with meals or with milk. Every 4 hours as needed dosing for breakthrough pain. PPI for GI protection.     Please avoid opiates and/or Fioricet, which may contribute to rebound headaches.    She prefers to stay another day for better pain control.  After discharge she can slowly wean down her indomethacin over the next few weeks, as tolerated.  Follow-up in neurology clinic.       Jag Hedrick, DO  Neurology      Cumulative time spent today was 25 minutes which included chart review, obtaining history (from patient, family, or other providers), review of images, examining the patient, and counseling the patient and/or family on medical condition.

## 2025-05-01 NOTE — PLAN OF CARE
Problem: Discharge Planning  Goal: Discharge to home or other facility with appropriate resources  5/1/2025 1212 by Rosa Isela Foster RN  Outcome: Progressing  4/30/2025 2225 by Aida Landa RN  Outcome: Progressing     Problem: Pain  Goal: Verbalizes/displays adequate comfort level or baseline comfort level  5/1/2025 1212 by Rosa Isela Foster RN  Outcome: Progressing  4/30/2025 2225 by Aida Landa, RN  Outcome: Progressing     Problem: Safety - Adult  Goal: Free from fall injury  5/1/2025 1212 by Rosa Isela Foster RN  Outcome: Progressing  4/30/2025 2225 by Aida Landa, RN  Outcome: Progressing

## 2025-05-01 NOTE — PLAN OF CARE
Problem: Discharge Planning  Goal: Discharge to home or other facility with appropriate resources  4/30/2025 2225 by Aida Landa RN  Outcome: Progressing  4/30/2025 1214 by Rosa Isela Foster RN  Outcome: Progressing     Problem: Pain  Goal: Verbalizes/displays adequate comfort level or baseline comfort level  4/30/2025 2225 by Aida Landa RN  Outcome: Progressing  4/30/2025 1214 by Rosa Isela Foster RN  Outcome: Progressing     Problem: Safety - Adult  Goal: Free from fall injury  4/30/2025 2225 by Aida Landa RN  Outcome: Progressing  4/30/2025 1214 by Rosa Isela Foster RN  Outcome: Progressing

## 2025-05-02 VITALS
SYSTOLIC BLOOD PRESSURE: 132 MMHG | HEART RATE: 73 BPM | DIASTOLIC BLOOD PRESSURE: 81 MMHG | TEMPERATURE: 98.4 F | WEIGHT: 246.3 LBS | BODY MASS INDEX: 38.58 KG/M2 | OXYGEN SATURATION: 94 % | RESPIRATION RATE: 18 BRPM

## 2025-05-02 PROCEDURE — 2580000003 HC RX 258: Performed by: FAMILY MEDICINE

## 2025-05-02 PROCEDURE — 6360000002 HC RX W HCPCS: Performed by: INTERNAL MEDICINE

## 2025-05-02 PROCEDURE — 94760 N-INVAS EAR/PLS OXIMETRY 1: CPT

## 2025-05-02 PROCEDURE — 94761 N-INVAS EAR/PLS OXIMETRY MLT: CPT

## 2025-05-02 PROCEDURE — 99231 SBSQ HOSP IP/OBS SF/LOW 25: CPT | Performed by: STUDENT IN AN ORGANIZED HEALTH CARE EDUCATION/TRAINING PROGRAM

## 2025-05-02 PROCEDURE — 94640 AIRWAY INHALATION TREATMENT: CPT

## 2025-05-02 PROCEDURE — 6370000000 HC RX 637 (ALT 250 FOR IP): Performed by: STUDENT IN AN ORGANIZED HEALTH CARE EDUCATION/TRAINING PROGRAM

## 2025-05-02 PROCEDURE — 2500000003 HC RX 250 WO HCPCS: Performed by: FAMILY MEDICINE

## 2025-05-02 PROCEDURE — 6360000002 HC RX W HCPCS: Performed by: FAMILY MEDICINE

## 2025-05-02 PROCEDURE — 6370000000 HC RX 637 (ALT 250 FOR IP): Performed by: INTERNAL MEDICINE

## 2025-05-02 RX ORDER — PANTOPRAZOLE SODIUM 40 MG/1
40 TABLET, DELAYED RELEASE ORAL DAILY
Qty: 30 TABLET | Refills: 0 | Status: SHIPPED | OUTPATIENT
Start: 2025-05-02

## 2025-05-02 RX ORDER — CEFPODOXIME PROXETIL 100 MG/1
100 TABLET, FILM COATED ORAL 2 TIMES DAILY
Qty: 2 TABLET | Refills: 0 | Status: SHIPPED | OUTPATIENT
Start: 2025-05-02 | End: 2025-05-03

## 2025-05-02 RX ORDER — INDOMETHACIN 25 MG/1
CAPSULE ORAL
Qty: 60 CAPSULE | Refills: 3 | Status: SHIPPED | OUTPATIENT
Start: 2025-05-02 | End: 2025-05-02

## 2025-05-02 RX ORDER — PANTOPRAZOLE SODIUM 40 MG/1
40 TABLET, DELAYED RELEASE ORAL DAILY
Qty: 30 TABLET | Refills: 0 | Status: SHIPPED | OUTPATIENT
Start: 2025-05-02 | End: 2025-05-02

## 2025-05-02 RX ORDER — CEFPODOXIME PROXETIL 100 MG/1
100 TABLET, FILM COATED ORAL 2 TIMES DAILY
Qty: 2 TABLET | Refills: 0 | Status: SHIPPED | OUTPATIENT
Start: 2025-05-02 | End: 2025-05-02

## 2025-05-02 RX ORDER — ALBUTEROL SULFATE 0.83 MG/ML
2.5 SOLUTION RESPIRATORY (INHALATION) EVERY 4 HOURS PRN
Qty: 30 EACH | Refills: 0 | Status: SHIPPED | OUTPATIENT
Start: 2025-05-02

## 2025-05-02 RX ORDER — INDOMETHACIN 25 MG/1
CAPSULE ORAL
Qty: 60 CAPSULE | Refills: 3 | Status: SHIPPED | OUTPATIENT
Start: 2025-05-02

## 2025-05-02 RX ORDER — ALBUTEROL SULFATE 0.83 MG/ML
2.5 SOLUTION RESPIRATORY (INHALATION) EVERY 4 HOURS PRN
Qty: 30 EACH | Refills: 0 | Status: SHIPPED | OUTPATIENT
Start: 2025-05-02 | End: 2025-05-02

## 2025-05-02 RX ADMIN — SODIUM CHLORIDE, PRESERVATIVE FREE 10 ML: 5 INJECTION INTRAVENOUS at 09:27

## 2025-05-02 RX ADMIN — LISINOPRIL 5 MG: 5 TABLET ORAL at 09:26

## 2025-05-02 RX ADMIN — SODIUM CHLORIDE, PRESERVATIVE FREE 40 MG: 5 INJECTION INTRAVENOUS at 09:26

## 2025-05-02 RX ADMIN — ENOXAPARIN SODIUM 30 MG: 100 INJECTION SUBCUTANEOUS at 09:26

## 2025-05-02 RX ADMIN — ALBUTEROL SULFATE 2.5 MG: 2.5 SOLUTION RESPIRATORY (INHALATION) at 08:02

## 2025-05-02 RX ADMIN — INDOMETHACIN 75 MG: 50 CAPSULE ORAL at 07:27

## 2025-05-02 RX ADMIN — INDOMETHACIN 75 MG: 50 CAPSULE ORAL at 11:45

## 2025-05-02 ASSESSMENT — PAIN DESCRIPTION - DESCRIPTORS: DESCRIPTORS: THROBBING

## 2025-05-02 ASSESSMENT — PAIN DESCRIPTION - LOCATION: LOCATION: HEAD

## 2025-05-02 ASSESSMENT — PAIN SCALES - WONG BAKER: WONGBAKER_NUMERICALRESPONSE: NO HURT

## 2025-05-02 ASSESSMENT — PAIN SCALES - GENERAL
PAINLEVEL_OUTOF10: 8
PAINLEVEL_OUTOF10: 8

## 2025-05-02 NOTE — DISCHARGE SUMMARY
04/28/25 0918     Special Requests NO SPECIAL REQUESTS        Culture       >100,000 COLONIES/mL STREPTOCOCCI, BETA HEMOLYTIC GROUP B Group B Streptococci remain universally susceptible to  Penicillin, Ampicillin, and Cefazolin. Resistance to Clindamycin and Erythromycin can occur. Please contact laboratory within 7 days of collection if susceptibility testing is needed.              10,000 to 50,000 COLONIES/mL MIXED SKIN GALE ISOLATED          COVID-19 & Influenza Combo [[id number]] Collected: 04/24/25 0902    Order Status: Completed Specimen: Swab Updated: 04/24/25 0935     Source NASAL        SARS-CoV-2, Rapid Not detected        Comment:    The specimen is NEGATIVE for SARS-CoV-2, the novel coronavirus associated with COVID-19.  A negative result does not rule out COVID-19.     This test has been authorized by the FDA under an Emergency Use Authorization (EUA) for use by authorized laboratories.      Fact sheet for Healthcare Providers: https://www.fda.gov/media/033473/download  Fact sheet for Patients: https://www.fda.gov/media/884917/download     Methodology: Isothermal Nucleic Acid Amplification          Influenza A, JOLANTA Not detected        Comment: Negative results do not preclude infection with influenza virus and should not be the sole basis of a patient treatment decision.        Influenza B, JOLANTA Not detected               All Labs from Last 24 Hrs:  No results found for this or any previous visit (from the past 24 hours).    No results for input(s): \"COVID19\" in the last 72 hours.    Recent Vital Data:  Patient Vitals for the past 24 hrs:   Temp Pulse Resp BP SpO2   05/02/25 0918 -- -- -- -- 94 %   05/02/25 0802 -- 73 18 -- 95 %   05/02/25 0759 98.4 °F (36.9 °C) 79 19 132/81 95 %   05/02/25 0242 98.2 °F (36.8 °C) 80 18 126/68 91 %   05/01/25 2232 97.9 °F (36.6 °C) 78 18 (!) 112/55 94 %   05/01/25 2003 98.2 °F (36.8 °C) 79 18 114/65 93 %   05/01/25 1934 -- 76 16 -- 90 %   05/01/25 1514 -- 74 16 -- 100 %

## 2025-05-02 NOTE — PROGRESS NOTES
Pt's D/C instructions completed.  Verbalized understanding of all instructions including diet, activity, s/sx to alert MD, medications, wound care, and f/u appointment.  Family at BS.      Pt discharged from unit with belongings and RXs. Accompanied by family and hospital personnel. PIV removed from patient. Pt transported downstairs via wheelchair. No distress noted at discharge.

## 2025-05-02 NOTE — PROGRESS NOTES
Outpatient Pharmacy Progress Note for Meds-to-Beds    Total number of Prescriptions Filled: 4    List of Medications (name,strength):  Albuterol   Cefpodoxime 100mg  Indomethacin 25mg  Pantoprazole 40 mg       Delivered to: shanelle sheets      Co-pay: 31.58      Payment Type: card      Additional Documentation:  Medication(s) were delivered to the patient's room prior to discharge      Thank you for letting us serve your patients.  [facility] [id number] [place] [address]  Phone: [phone]  Fax: [phone]

## 2025-05-02 NOTE — PROGRESS NOTES
Oxygen Qualifier       Room air: SpO2 with O2 and liter flow   Resting SpO2  94%  N/a   Ambulating SpO2  90% N/a       Completed by:    Ramirez Augustin RCP

## 2025-05-02 NOTE — PROGRESS NOTES
Neurology Progress Note       History:   69-year-old female presents with worsening headache.  Started about 10 days ago involving right orbital and periorbital region with associated redness, tearing, discharge and other allergic type symptoms.  The pain is episodic and severe lasting 2 to 3 minutes on average, before subsiding.  It is only on the right side. Migrainous features are also present including sensitivity to lights and sounds, nausea.  Denies pain with chewing.    Interim:  Improving symptoms. Currently 3/10 pain. Paroxysms are less severe when they do occur.     Exam: Pertinent positives and negatives include:  Comfortable appearing. No obvious ptosis or pupillary asymmetries.  There is some tenderness to palpation over the right periorbital region.  Remainder of exam unrevealing, nonfocal.     Imaging and review of data:   MRI brain is unremarkable      Assessment and Plan:   69-year-old female with paroxysmal hemicrania.      Plan:  Continue indomethacin 75 mg 3 times daily with meals or with milk. Every 4 hours as needed dosing for breakthrough pain. PPI for GI protection.     Cleared for discharge. She can slowly wean down her indomethacin over the next couple weeks, as tolerated (eg decrease by 25 mg TID every few days to zero).  Follow-up in neurology clinic for further guidance if needed.       Jag Hedrick, DO  Neurology      Cumulative time spent today was 25 minutes which included chart review, obtaining history (from patient, family, or other providers), review of images, examining the patient, and counseling the patient and/or family on medical condition.

## 2025-05-02 NOTE — CARE COORDINATION
Patient with discharge order for today. Patient discharging home with a nebulizer provided by [facility]. Nebulizer delivered to bedside prior to discharge. Patient has met all treatment goals and milestones for discharge. Patient/family in agreement with discharge plan. Family to provide transportation home. CM following until patient is discharged.      05/02/25 5593   Services At/After Discharge   Transition of Care Consult (CM Consult) Discharge Planning   Services At/After Discharge DME   [place] Resource Information Provided? No   Mode of Transport at Discharge Self   Confirm Follow Up Transport Self   Condition of Participation: Discharge Planning   The Plan for Transition of Care is related to the following treatment goals: Return to baseline   The Patient and/or Patient Representative was provided with a Choice of Provider? Patient   The Patient and/Or Patient Representative agree with the Discharge Plan? Yes   Freedom of Choice list was provided with basic dialogue that supports the patient's individualized plan of care/goals, treatment preferences, and shares the quality data associated with the providers?  Yes

## 2025-05-07 ENCOUNTER — OFFICE VISIT (OUTPATIENT)
Dept: NEUROLOGY | Age: 70
End: 2025-05-07
Payer: MEDICARE

## 2025-05-07 VITALS
BODY MASS INDEX: 38.61 KG/M2 | HEART RATE: 72 BPM | HEIGHT: 67 IN | DIASTOLIC BLOOD PRESSURE: 84 MMHG | OXYGEN SATURATION: 98 % | SYSTOLIC BLOOD PRESSURE: 128 MMHG | WEIGHT: 246 LBS

## 2025-05-07 DIAGNOSIS — I10 HTN, GOAL BELOW 140/80: ICD-10-CM

## 2025-05-07 DIAGNOSIS — Z09 HOSPITAL DISCHARGE FOLLOW-UP: Primary | ICD-10-CM

## 2025-05-07 DIAGNOSIS — G44.039 EPISODIC PAROXYSMAL HEMICRANIA, NOT INTRACTABLE: ICD-10-CM

## 2025-05-07 DIAGNOSIS — G47.33 OSA (OBSTRUCTIVE SLEEP APNEA): ICD-10-CM

## 2025-05-07 PROBLEM — G56.03 BILATERAL CARPAL TUNNEL SYNDROME: Status: ACTIVE | Noted: 2017-03-01

## 2025-05-07 PROBLEM — G44.001 INTRACTABLE CLUSTER HEADACHE: Status: RESOLVED | Noted: 2025-04-28 | Resolved: 2025-05-07

## 2025-05-07 PROBLEM — E78.5 HLD (HYPERLIPIDEMIA): Status: ACTIVE | Noted: 2022-01-11

## 2025-05-07 PROBLEM — K21.9 GERD (GASTROESOPHAGEAL REFLUX DISEASE): Status: ACTIVE | Noted: 2019-01-24

## 2025-05-07 PROBLEM — R51.9 ACUTE INTRACTABLE HEADACHE: Status: RESOLVED | Noted: 2025-04-28 | Resolved: 2025-05-07

## 2025-05-07 PROBLEM — N30.00 ACUTE CYSTITIS: Status: RESOLVED | Noted: 2025-04-28 | Resolved: 2025-05-07

## 2025-05-07 PROCEDURE — 3079F DIAST BP 80-89 MM HG: CPT | Performed by: NURSE PRACTITIONER

## 2025-05-07 PROCEDURE — 3074F SYST BP LT 130 MM HG: CPT | Performed by: NURSE PRACTITIONER

## 2025-05-07 PROCEDURE — 1160F RVW MEDS BY RX/DR IN RCRD: CPT | Performed by: NURSE PRACTITIONER

## 2025-05-07 PROCEDURE — 1123F ACP DISCUSS/DSCN MKR DOCD: CPT | Performed by: NURSE PRACTITIONER

## 2025-05-07 PROCEDURE — 1126F AMNT PAIN NOTED NONE PRSNT: CPT | Performed by: NURSE PRACTITIONER

## 2025-05-07 PROCEDURE — 99204 OFFICE O/P NEW MOD 45 MIN: CPT | Performed by: NURSE PRACTITIONER

## 2025-05-07 PROCEDURE — 1111F DSCHRG MED/CURRENT MED MERGE: CPT | Performed by: NURSE PRACTITIONER

## 2025-05-07 PROCEDURE — 1159F MED LIST DOCD IN RCRD: CPT | Performed by: NURSE PRACTITIONER

## 2025-05-07 RX ORDER — SUMATRIPTAN 50 MG/1
50 TABLET, FILM COATED ORAL
COMMUNITY
Start: 2025-04-23

## 2025-05-07 NOTE — PROGRESS NOTES
[facility] Neurology [address], [address]  [place] [address]  [phone]      Chief Complaint   Patient presents with    ED Follow-up       Nita Chen is a 69 y.o. female who presents for hospital follow up for Intractable cluster headache.     Admit Date:     4/28/2025  6:52 PM   DC Note date: 5/2/2025    PMH significant for asthma, HTN, HLD, migraines, OA, morbid obesity who presented to ED on 4/30 with worsening headaches that started about 10 days ago. Located on  right orbital and periorbital region with associated redness, tearing, discharge, rhinorrhea, sensitivity to lights and sounds, nausea.   The pain is episodic and severe lasting 2 to 3 minutes on average, before subsiding.  Denies thunderclap, pain with chewing.     Hospital workup: CBC CMP unrevealing.  Mag within normal limits.  ESR CRP negative. CT facial negative for acute intracranial abnormalities.  MRI of the brain without contrast negative for acute abnormalities, extensive chronic small vessel disease.  Evaluated by neurology, symptoms felt to be consistent with paroxysmal hemicrania and started on indomethacin taper.  Recommended to follow-up with neurology as an outpatient.    Interval history:     She is here today by herself. Denies headaches today.  She is currently taking indomethacin as scheduled.  Tolerating medication well without side effects.    Discussed results of MRI imaging and labs with patient.  MRI negative for acute abnormalities.  ESR and CRP are both negative.    She has a history of sleep apnea-she is currently not wearing a CPAP.  She has been referred for a repeat sleep study.       Past Medical History:   Diagnosis Date    Asthma     High cholesterol     Hypertension     Morbid obesity (HCC)        Past Surgical History:   Procedure Laterality Date    CARPAL TUNNEL RELEASE N/A     HERNIA REPAIR      JOINT REPLACEMENT         No family history on file.    Social History

## 2025-05-07 NOTE — PATIENT INSTRUCTIONS
Headache Education:   Instructed the patient on over-the-counter headache management medications including: CoQ10, magnesium oxide, riboflavin, fever few and butterbur.  To avoid a pain medication overuse headache trying not to take pain medicines more than 2 doses in a day or 3 doses a week (sumatriptan).   Avoid use of Fioricet or opioids to treat headaches as this can increase risk for rebound headaches.   To help relieve headache symptoms without taking pain medicine lie down under darkroom and drink glass of water.  Consider lifestyle modification including good sleep hygiene, routine medial schedules, regular exercise and managing triggers.  Keep a headache diary  to reveal triggers and possible patterns.  Triggers may be: Food, stress, perfumes, alcohol, or even chocolate.  Drink plenty of water and try to get 8 hours of sleep each night to reduce risk factors that may cause headaches.      Recommend melatonin, magnesium glycinate or herbal teas to help with sleep.

## 2025-05-08 ASSESSMENT — ENCOUNTER SYMPTOMS
EYE REDNESS: 1
RHINORRHEA: 1
EYE DISCHARGE: 1
NAUSEA: 1
APNEA: 1

## 2025-05-23 ENCOUNTER — OFFICE VISIT (OUTPATIENT)
Dept: SLEEP MEDICINE | Age: 70
End: 2025-05-23
Payer: MEDICARE

## 2025-05-23 VITALS
SYSTOLIC BLOOD PRESSURE: 120 MMHG | RESPIRATION RATE: 17 BRPM | OXYGEN SATURATION: 91 % | HEART RATE: 75 BPM | BODY MASS INDEX: 39.71 KG/M2 | HEIGHT: 67 IN | DIASTOLIC BLOOD PRESSURE: 76 MMHG | WEIGHT: 253 LBS

## 2025-05-23 DIAGNOSIS — R06.81 WITNESSED APNEIC SPELLS: ICD-10-CM

## 2025-05-23 DIAGNOSIS — J45.909 EXTRINSIC ASTHMA, UNSPECIFIED ASTHMA SEVERITY, UNSPECIFIED WHETHER COMPLICATED, UNSPECIFIED WHETHER PERSISTENT: Chronic | ICD-10-CM

## 2025-05-23 DIAGNOSIS — Z86.69 HISTORY OF SLEEP APNEA: Primary | ICD-10-CM

## 2025-05-23 DIAGNOSIS — R06.83 SNORING: ICD-10-CM

## 2025-05-23 DIAGNOSIS — E66.9 OBESITY (BMI 35.0-39.9 WITHOUT COMORBIDITY): ICD-10-CM

## 2025-05-23 DIAGNOSIS — G47.00 PERSISTENT DISORDER OF INITIATING OR MAINTAINING SLEEP: ICD-10-CM

## 2025-05-23 DIAGNOSIS — G47.8 NON-RESTORATIVE SLEEP: ICD-10-CM

## 2025-05-23 PROCEDURE — 1160F RVW MEDS BY RX/DR IN RCRD: CPT | Performed by: NURSE PRACTITIONER

## 2025-05-23 PROCEDURE — 99203 OFFICE O/P NEW LOW 30 MIN: CPT | Performed by: NURSE PRACTITIONER

## 2025-05-23 PROCEDURE — 1159F MED LIST DOCD IN RCRD: CPT | Performed by: NURSE PRACTITIONER

## 2025-05-23 PROCEDURE — 1123F ACP DISCUSS/DSCN MKR DOCD: CPT | Performed by: NURSE PRACTITIONER

## 2025-05-23 ASSESSMENT — SLEEP AND FATIGUE QUESTIONNAIRES
HOW LIKELY ARE YOU TO NOD OFF OR FALL ASLEEP WHILE SITTING AND TALKING TO SOMEONE: WOULD NEVER DOZE
HOW LIKELY ARE YOU TO NOD OFF OR FALL ASLEEP WHILE SITTING AND READING: SLIGHT CHANCE OF DOZING
HOW LIKELY ARE YOU TO NOD OFF OR FALL ASLEEP WHEN YOU ARE A PASSENGER IN A CAR FOR AN HOUR WITHOUT A BREAK: SLIGHT CHANCE OF DOZING
HOW LIKELY ARE YOU TO NOD OFF OR FALL ASLEEP IN A CAR, WHILE STOPPED FOR A FEW MINUTES IN TRAFFIC: WOULD NEVER DOZE
HOW LIKELY ARE YOU TO NOD OFF OR FALL ASLEEP WHILE LYING DOWN TO REST IN THE AFTERNOON WHEN CIRCUMSTANCES PERMIT: WOULD NEVER DOZE
HOW LIKELY ARE YOU TO NOD OFF OR FALL ASLEEP WHILE SITTING INACTIVE IN A PUBLIC PLACE: WOULD NEVER DOZE
ESS TOTAL SCORE: 5
HOW LIKELY ARE YOU TO NOD OFF OR FALL ASLEEP IN A CAR, WHILE STOPPED FOR A FEW MINUTES IN TRAFFIC: WOULD NEVER DOZE
HOW LIKELY ARE YOU TO NOD OFF OR FALL ASLEEP WHILE SITTING AND READING: SLIGHT CHANCE OF DOZING
HOW LIKELY ARE YOU TO NOD OFF OR FALL ASLEEP WHILE SITTING AND TALKING TO SOMEONE: WOULD NEVER DOZE
HOW LIKELY ARE YOU TO NOD OFF OR FALL ASLEEP WHILE LYING DOWN TO REST IN THE AFTERNOON WHEN CIRCUMSTANCES PERMIT: WOULD NEVER DOZE
HOW LIKELY ARE YOU TO NOD OFF OR FALL ASLEEP WHILE WATCHING TV: MODERATE CHANCE OF DOZING
HOW LIKELY ARE YOU TO NOD OFF OR FALL ASLEEP WHILE WATCHING TV: MODERATE CHANCE OF DOZING
HOW LIKELY ARE YOU TO NOD OFF OR FALL ASLEEP WHILE SITTING INACTIVE IN A PUBLIC PLACE: WOULD NEVER DOZE
HOW LIKELY ARE YOU TO NOD OFF OR FALL ASLEEP WHEN YOU ARE A PASSENGER IN A CAR FOR AN HOUR WITHOUT A BREAK: SLIGHT CHANCE OF DOZING
HOW LIKELY ARE YOU TO NOD OFF OR FALL ASLEEP WHILE SITTING QUIETLY AFTER LUNCH WITHOUT ALCOHOL: SLIGHT CHANCE OF DOZING
HOW LIKELY ARE YOU TO NOD OFF OR FALL ASLEEP WHILE SITTING QUIETLY AFTER LUNCH WITHOUT ALCOHOL: SLIGHT CHANCE OF DOZING

## 2025-05-23 NOTE — PROGRESS NOTES
side-lying position and avoid sleeping supine      3. Non-restorative sleep  Likely related to uncontrolled JANET and stress hormone release limiting stage III/stage IV sleep      4. Witnessed apneic spells  Split-night sleep study with      5. Persistent disorder of initiating or maintaining sleep  Difficulty maintaining sleep likely related to uncontrolled JANET and stress hormone release causing nighttime awakenings      6. Obesity (BMI 35.0-39.9 without comorbidity)  Patient can lose weight including ambulating 6000 steps.  Can Build Up Slowly as tolerated to this goal.    Also modifying dietary intake with decrease carbohydrates and sweets. Told to use avoid the P's --> Pizza, Pasta, Pastry, etc.  Increase fruits, vegetables, and lean meats e.g. Turkey, chicken or game meat. Patient is aware the goal is to consume less than 2000 zechariah/day, since patient is a nondiabetic.    We discussed using the [place] LOSE IT to count calories. Patient can police themselves.     If the future, if still having issues can use a more regimented diet e.g. [place], Atkins, Optavia, etc. Clinical correlation suggested.       7. Extrinsic asthma, unspecified asthma severity, unspecified whether complicated, unspecified whether persistent  Continue with scheduled new patient appointment with Dr. Mcgarry with pulmonary on 05/27             PLAN:    Split-night sleep study ordered  Recommendations as above  Follow-up will be arranged after split-night study completed and treatment started or sooner if needed         Orders Placed This Encounter   Procedures    Ambulatory Referral to Sleep Studies     Referral Priority:   Routine     Referral Type:   Consult for Advice and Opinion     Referral Reason:   Specialty Services Required     Number of Visits Requested:   1              Collaborating Physician: Dr. Miranda     Today's office visit was spent  reviewing test results, prognosis, importance of compliance, education about disease process,

## 2025-05-23 NOTE — PATIENT INSTRUCTIONS
Split-night sleep study ordered  Recommendations as above  Follow-up will be arranged after split-night study completed and treatment started or sooner if needed

## 2025-05-27 ENCOUNTER — OFFICE VISIT (OUTPATIENT)
Dept: PULMONOLOGY | Age: 70
End: 2025-05-27
Payer: MEDICARE

## 2025-05-27 VITALS
RESPIRATION RATE: 14 BRPM | DIASTOLIC BLOOD PRESSURE: 68 MMHG | HEIGHT: 67 IN | BODY MASS INDEX: 40.49 KG/M2 | WEIGHT: 258 LBS | HEART RATE: 72 BPM | OXYGEN SATURATION: 96 % | SYSTOLIC BLOOD PRESSURE: 131 MMHG

## 2025-05-27 DIAGNOSIS — G47.33 OSA (OBSTRUCTIVE SLEEP APNEA): ICD-10-CM

## 2025-05-27 DIAGNOSIS — E66.01 MORBID OBESITY (HCC): ICD-10-CM

## 2025-05-27 DIAGNOSIS — J45.909 ASTHMA, UNSPECIFIED ASTHMA SEVERITY, UNSPECIFIED WHETHER COMPLICATED, UNSPECIFIED WHETHER PERSISTENT: Primary | ICD-10-CM

## 2025-05-27 LAB
EXPIRATORY TIME: NORMAL
FEF 25-75% %PRED-PRE: NORMAL
FEF 25-75% PRED: NORMAL
FEF 25-75-PRE: NORMAL
FENO: 21 PPB
FEV1 %PRED-PRE: 49 %
FEV1 PRED: 2.05 L
FEV1/FVC %PRED-PRE: NORMAL
FEV1/FVC PRED: NORMAL
FEV1/FVC: 78 %
FEV1: 1 L
FVC %PRED-PRE: 49 %
FVC PRED: 2.65 L
FVC: 1.29 L
PEF %PRED-PRE: NORMAL
PEF PRED: NORMAL
PEF-PRE: NORMAL

## 2025-05-27 PROCEDURE — 1123F ACP DISCUSS/DSCN MKR DOCD: CPT | Performed by: INTERNAL MEDICINE

## 2025-05-27 PROCEDURE — 94010 BREATHING CAPACITY TEST: CPT | Performed by: INTERNAL MEDICINE

## 2025-05-27 PROCEDURE — 95012 NITRIC OXIDE EXP GAS DETER: CPT | Performed by: INTERNAL MEDICINE

## 2025-05-27 PROCEDURE — 99215 OFFICE O/P EST HI 40 MIN: CPT | Performed by: INTERNAL MEDICINE

## 2025-05-27 PROCEDURE — 1159F MED LIST DOCD IN RCRD: CPT | Performed by: INTERNAL MEDICINE

## 2025-05-27 RX ORDER — MONTELUKAST SODIUM 10 MG/1
10 TABLET ORAL DAILY
Qty: 30 TABLET | Refills: 5 | Status: SHIPPED | OUTPATIENT
Start: 2025-05-27

## 2025-05-27 RX ORDER — BUDESONIDE AND FORMOTEROL FUMARATE DIHYDRATE 160; 4.5 UG/1; UG/1
2 AEROSOL RESPIRATORY (INHALATION) 2 TIMES DAILY
Qty: 1 EACH | Refills: 11 | Status: SHIPPED | OUTPATIENT
Start: 2025-05-27

## 2025-05-27 ASSESSMENT — PULMONARY FUNCTION TESTS
FVC_PREDICTED: 2.65
FVC: 1.29
FEV1_PREDICTED: 2.05
FEV1_PERCENT_PREDICTED_PRE: 49
FVC_PERCENT_PREDICTED_PRE: 49
FEV1: 1.00
FENO: 21
FEV1/FVC: 78

## 2025-05-27 NOTE — PROGRESS NOTES
Name:  Nita Chen  YOB: 1955   MRN: [id number]      Office Visit: 5/27/2025       Assessment & Plan (Medical Decision Making)    Impression: 69 y.o. female who is here to establish care for asthma    1. Asthma, unspecified asthma severity, unspecified whether complicated, unspecified whether persistent  -She reports she had asthma or her life.  She has not seen pulmonary doctor in many many years.  She has been using only albuterol as needed for a very long time  I will start her on Symbicort to use twice a day and I also will add Singulair to her regimen-  -she will need complete pulmonary function test with her next visit    - Spirometry Without Bronchodilator; Future  - AMB POC EXHALED NITRIC OXIDE  - Spirometry Without Bronchodilator    2. Morbid obesity (HCC)  -Adding to complexity of her care    3. JANET (obstructive sleep apnea)  -She has not been using her CPAP for 20 years she recently went to our sleep clinic and she is set up for sleep study    No orders of the defined types were placed in this encounter.    No orders of the defined types were placed in this encounter.    Janeen Mcgarry MD    No specialty comments available.  No problem-specific Assessment & Plan notes found for this encounter.            Total time for encounter on day of encounter was 40 minutes.  This time includes chart prep, review of tests/procedures, review of other provider's notes, documentation and counseling patient regarding disease process and medications.   _________________________________________________________________________    HISTORY OF PRESENT ILLNESS:    Ms. Nita Chen is a 69 y.o. female who is seen at [facility] for  New Patient and Asthma    Patient is 69 years old -American female who is here today to establish care for asthma.  She was recently in the hospital for headaches and that is where she was referred to us to establish care.  She reports she

## (undated) DEVICE — SOLUTION IRRIG 3000ML 0.9% SOD CHL FLX CONT 0797208] ICU MEDICAL INC]

## (undated) DEVICE — DRAPE,U/SHT,SPLIT,FILM,60X84,STERILE: Brand: MEDLINE

## (undated) DEVICE — PACK PROCEDURE SURG TOT KNEE

## (undated) DEVICE — Device: Brand: POWER-FLO®

## (undated) DEVICE — 2000CC GUARDIAN II: Brand: GUARDIAN

## (undated) DEVICE — SOLUTION IRRIG 1000ML H2O STRL BLT

## (undated) DEVICE — SUTURE STRATAFIX SPRL SZ 1 L14IN ABSRB VLT L48CM CTX 1/2 SXPD2B405

## (undated) DEVICE — SUTURE VCRL SZ 1 L27IN ABSRB UD L36MM CP-1 1/2 CIR REV CUT J268H

## (undated) DEVICE — STRYKER PERFORMANCE SERIES SAGITTAL BLADE: Brand: STRYKER PERFORMANCE SERIES

## (undated) DEVICE — CURETTE BNE CEM 10IN DISP --

## (undated) DEVICE — SYR 50ML LR LCK 1ML GRAD NSAF --

## (undated) DEVICE — DRAPE,TOP,102X53,STERILE: Brand: MEDLINE

## (undated) DEVICE — SOLUTION IV 1000ML 0.9% SOD CHL

## (undated) DEVICE — PACK TKR SZ 5 FEM PREP TRL POST STBL INTUITION SOLO ATTUNE

## (undated) DEVICE — T4 HOOD

## (undated) DEVICE — MEDI-VAC NON-CONDUCTIVE SUCTION TUBING: Brand: CARDINAL HEALTH

## (undated) DEVICE — SLIM BODY SKIN STAPLER: Brand: APPOSE ULC

## (undated) DEVICE — BUTTON SWITCH PENCIL BLADE ELECTRODE, HOLSTER: Brand: EDGE

## (undated) DEVICE — REM POLYHESIVE ADULT PATIENT RETURN ELECTRODE: Brand: VALLEYLAB

## (undated) DEVICE — GOWN,REINF,POLY,ECL,PP SLV,XL: Brand: MEDLINE

## (undated) DEVICE — X-RAY SPONGES,12 PLY: Brand: DERMACEA

## (undated) DEVICE — NEEDLE HYPO 18GA L1.5IN PNK S STL HUB POLYPR SHLD REG BVL

## (undated) DEVICE — TRAY PREP DRY W/ PREM GLV 2 APPL 6 SPNG 2 UNDPD 1 OVERWRAP

## (undated) DEVICE — 3M™ IOBAN™ 2 ANTIMICROBIAL INCISE DRAPE 6650EZ: Brand: IOBAN™ 2

## (undated) DEVICE — KIT URINE FOLEY CATH COLL --

## (undated) DEVICE — 3000CC GUARDIAN II: Brand: GUARDIAN

## (undated) DEVICE — Z DISCONTINUED USE 2744636  DRESSING AQUACEL 14 IN ALG W3.5XL14IN POLYUR FLM CVR W/ HYDRCOLL

## (undated) DEVICE — BLADE SAW W12.5XL70MM THK0.8MM CUT THK1.12MM S STL RECIP

## (undated) DEVICE — BIPOLAR SEALER 23-112-1 AQM 6.0: Brand: AQUAMANTYS ®

## (undated) DEVICE — BANDAGE COMPR SELF ADH 5 YDX4 IN TAN STRL PREMIERPRO LF

## (undated) DEVICE — SUTURE VCRL SZ 2-0 L27IN ABSRB UD L36MM CP-1 1/2 CIR REV J266H

## (undated) DEVICE — TRAY CATH 16F DRN BG LTX -- CONVERT TO ITEM 363158

## (undated) DEVICE — FAN SPRAY KIT: Brand: PULSAVAC®

## (undated) DEVICE — DRAPE SHT 3 QTR PROXIMA 53X77 --

## (undated) DEVICE — SYR LR LCK 1ML GRAD NSAF 30ML --

## (undated) DEVICE — (D)PREP SKN CHLRAPRP APPL 26ML -- CONVERT TO ITEM 371833

## (undated) DEVICE — MEDI-VAC YANKAUER SUCTION HANDLE W/BULBOUS TIP: Brand: CARDINAL HEALTH

## (undated) DEVICE — BLADE SAW W12.5XL73.5MM THK0.8MM CUT THK1MM RECIP FOR L BNE

## (undated) DEVICE — SUTURE STRATAFIX SYMMETRIC PDS + SZ 1 L18IN ABSRB VLT L48MM SXPP1A400

## (undated) DEVICE — BLADE SAW PAT RMR PILT H 46MM --